# Patient Record
Sex: FEMALE | Race: WHITE | NOT HISPANIC OR LATINO | Employment: OTHER | URBAN - METROPOLITAN AREA
[De-identification: names, ages, dates, MRNs, and addresses within clinical notes are randomized per-mention and may not be internally consistent; named-entity substitution may affect disease eponyms.]

---

## 2017-10-11 ENCOUNTER — OFFICE VISIT (OUTPATIENT)
Dept: URGENT CARE | Facility: PHYSICIAN GROUP | Age: 62
End: 2017-10-11

## 2017-10-11 VITALS
OXYGEN SATURATION: 98 % | BODY MASS INDEX: 28.93 KG/M2 | DIASTOLIC BLOOD PRESSURE: 62 MMHG | TEMPERATURE: 97 F | SYSTOLIC BLOOD PRESSURE: 126 MMHG | WEIGHT: 180 LBS | HEART RATE: 99 BPM | HEIGHT: 66 IN

## 2017-10-11 DIAGNOSIS — Z91.148 NON COMPLIANCE W MEDICATION REGIMEN: ICD-10-CM

## 2017-10-11 DIAGNOSIS — I10 ESSENTIAL HYPERTENSION: Primary | ICD-10-CM

## 2017-10-11 PROCEDURE — 99202 OFFICE O/P NEW SF 15 MIN: CPT | Performed by: NURSE PRACTITIONER

## 2017-10-11 PROCEDURE — 93000 ELECTROCARDIOGRAM COMPLETE: CPT | Performed by: NURSE PRACTITIONER

## 2017-10-11 ASSESSMENT — ENCOUNTER SYMPTOMS
FEVER: 0
HEADACHES: 0
VOMITING: 0
PALPITATIONS: 0
SPEECH CHANGE: 0
CHILLS: 0
BLURRED VISION: 0
NAUSEA: 0
FOCAL WEAKNESS: 0
DOUBLE VISION: 0
DIZZINESS: 1
MYALGIAS: 0
SHORTNESS OF BREATH: 0
SENSORY CHANGE: 0

## 2017-10-12 NOTE — PROGRESS NOTES
"Subjective:      Rony Chilel is a 62 y.o. female who presents with Hypertension (Dizziness, nausea x1 wk. Pt was taken off BP medication due to weight loss, BP has been going up this past week, reaching 210/110.  )              Patient has a history of hypertension. She previously took amlodipine 10 mg by mouth daily. Patient states she changed her diet to a vegetarian diet. Her blood pressure gradually returned to normal and she was taken off amlodipine. Patient states within the last week she noticed her blood pressure was elevated when taken at home and she started back on her amlodipine 10 mg by mouth. Patient states today she took her blood pressure multiple times with it becoming more and more elevated. She had taken one amlodipine this morning and took another 5 mg and amlodipine roughly 1 hour ago. Patient also complains of dizziness for approximately one week intermittently. Currently, it has resolved. She denies any chest pain. She denies any shortness of breath, swelling in her legs or headaches.    Medications, Allergies and Prior Medical Hx reviewed and updated in [facility].with patient/family today           Review of Systems   Constitutional: Negative for chills and fever.   Eyes: Negative for blurred vision and double vision.   Respiratory: Negative for shortness of breath.    Cardiovascular: Negative for chest pain, palpitations and leg swelling.   Gastrointestinal: Negative for nausea and vomiting.   Musculoskeletal: Negative for myalgias.   Skin: Negative for rash.   Neurological: Positive for dizziness (intermittent). Negative for sensory change, speech change, focal weakness and headaches.          Objective:     /62   Pulse 99   Temp 36.1 °C (97 °F)   Ht 1.676 m (5' 6\")   Wt 81.6 kg (180 lb)   SpO2 98%   BMI 29.05 kg/m²      Physical Exam   Constitutional: She appears well-developed and well-nourished. No distress.   HENT:   Head: Normocephalic and atraumatic.   Eyes: Conjunctivae are " normal. Pupils are equal, round, and reactive to light.   Neck: Neck supple. Carotid bruit is not present.   Cardiovascular: Normal rate, regular rhythm and normal heart sounds.    Pulses:       Carotid pulses are 2+ on the right side, and 2+ on the left side.  Pulmonary/Chest: Effort normal and breath sounds normal. No respiratory distress.   Musculoskeletal: She exhibits no edema.   Neurological: She is alert.   Awake, alert, answering questions appropriately, moving all extremeties   Skin: Skin is warm and dry. No rash noted.   Psychiatric: She has a normal mood and affect. Her behavior is normal.   Vitals reviewed.              Assessment/Plan:       1. Essential hypertension     2. Non compliance w medication regimen         EKG Interpretation   Interpreted by me   Rhythm: normal sinus   Rate: 86  Axis: normal   Ectopy: none   Conduction: RSR in v1 v2  ST Segments: wnl  T Waves: wnl  Q Waves: none   Clinical Impression: normal EKG      BP recheck by provider on both arm with BP remaining 120's / 80's    Pt denies dizziness currently, BP is wnl. Pt has an appt with her pcp in am.   Pt was instructed to go home rest,   Go the the ED/911 for chest pain, increased dizziness, sob, headache or any other concerns  Follow-up with pcp in am   Discharge instructions discussed with pt/family who verbalize understanding and agreement with poc

## 2019-04-15 ENCOUNTER — OFFICE VISIT (OUTPATIENT)
Dept: URGENT CARE | Facility: PHYSICIAN GROUP | Age: 64
End: 2019-04-15

## 2019-04-15 VITALS
OXYGEN SATURATION: 93 % | SYSTOLIC BLOOD PRESSURE: 124 MMHG | WEIGHT: 190 LBS | HEIGHT: 66 IN | HEART RATE: 97 BPM | DIASTOLIC BLOOD PRESSURE: 86 MMHG | TEMPERATURE: 98.2 F | BODY MASS INDEX: 30.53 KG/M2

## 2019-04-15 DIAGNOSIS — J03.90 TONSILLITIS: ICD-10-CM

## 2019-04-15 PROCEDURE — 99214 OFFICE O/P EST MOD 30 MIN: CPT | Performed by: PHYSICIAN ASSISTANT

## 2019-04-15 RX ORDER — AMOXICILLIN 875 MG/1
875 TABLET, COATED ORAL 2 TIMES DAILY
Qty: 14 TAB | Refills: 0 | Status: SHIPPED | OUTPATIENT
Start: 2019-04-15 | End: 2019-04-22

## 2019-04-15 ASSESSMENT — ENCOUNTER SYMPTOMS
SORE THROAT: 1
SINUS PAIN: 0
CONSTITUTIONAL NEGATIVE: 1
FEVER: 0
COUGH: 1
CHILLS: 0
SHORTNESS OF BREATH: 0

## 2019-04-15 NOTE — PROGRESS NOTES
Subjective:   Rony Chilel is a 64 y.o. female who presents today with   Chief Complaint   Patient presents with   • Pharyngitis     Fatigue x 3 wks        Pharyngitis    This is a new problem. The current episode started 1 to 4 weeks ago. The problem has been unchanged. There has been no fever. Associated symptoms include coughing and a plugged ear sensation. Pertinent negatives include no shortness of breath.   Patient presents for evaluation of sore throat that she has had for over 2 weeks now following having the flu.  She has tried numerous over-the-counter remedies with no relief.  She is asking for amoxicillin today.    PMH:  has a past medical history of Hypertension and Psychiatric disorder.  MEDS:   Current Outpatient Prescriptions:   •  amoxicillin (AMOXIL) 875 MG tablet, Take 1 Tab by mouth 2 times a day for 7 days., Disp: 14 Tab, Rfl: 0  •  amlodipine (NORVASC) 10 MG TABS, Take 10 mg by mouth every day., Disp: , Rfl:   •  hydrocodone-acetaminophen (NORCO) 5-325 MG TABS per tablet, Take 1-2 Tabs by mouth every 6 hours as needed. (Patient not taking: Reported on 4/15/2019), Disp: 30 Tab, Rfl: 0  •  bacitracin-polymyxin b (POLYSPORIN) 500-42767 UNIT/GM OINT, Apply 1 Each to affected area(s) 3 times a day. (Patient not taking: Reported on 4/15/2019), Disp: 1 Tube, Rfl: 0  •  cyclobenzaprine (FLEXERIL) 5 MG tablet, Take 1 Tab by mouth 3 times a day as needed for Muscle Spasms. (Patient not taking: Reported on 4/15/2019), Disp: 21 Tab, Rfl: 0  •  paroxetine (PAXIL) 20 MG TABS, Take 20 mg by mouth every day., Disp: , Rfl:   ALLERGIES:   Allergies   Allergen Reactions   • Dilaudid [Hydromorphone] Itching   • Morphine Nausea   • Percocet [Apap-Fd&C Red #40 [place]-Oxycodone] Itching     SURGHX:   Past Surgical History:   Procedure Laterality Date   • CHOLECYSTECTOMY      Open     SOCHX:  reports that she has been smoking.  She has a 7.50 pack-year smoking history. She has never used smokeless tobacco. She  "reports that she does not drink alcohol or use drugs.  FH: Reviewed with patient, not pertinent to this visit.       Review of Systems   Constitutional: Negative.  Negative for chills and fever.   HENT: Positive for sore throat. Negative for sinus pain.    Respiratory: Positive for cough. Negative for shortness of breath.    All other systems reviewed and are negative.       Objective:   /86 (BP Location: Left arm, Patient Position: Sitting, BP Cuff Size: Large adult)   Pulse 97   Temp 36.8 °C (98.2 °F) (Temporal)   Ht 1.676 m (5' 6\")   Wt 86.2 kg (190 lb)   SpO2 93%   BMI 30.67 kg/m²   Physical Exam   Constitutional: She appears well-developed and well-nourished. No distress.   HENT:   Head: Normocephalic and atraumatic.   Right Ear: Hearing, tympanic membrane and ear canal normal.   Left Ear: Hearing, tympanic membrane and ear canal normal.   Mouth/Throat: Posterior oropharyngeal edema and posterior oropharyngeal erythema present. Tonsillar exudate.   Cerumen present bilaterally   Eyes: Pupils are equal, round, and reactive to light.   Cardiovascular: Normal rate, regular rhythm and normal heart sounds.    Pulmonary/Chest: Effort normal and breath sounds normal.   Musculoskeletal:   Normal movement in all 4 extremities   Neurological: She is alert. Coordination normal.   Skin: Skin is warm and dry.   Psychiatric: She has a normal mood and affect.   Nursing note and vitals reviewed.        Assessment/Plan:   Assessment    1. Tonsillitis  - amoxicillin (AMOXIL) 875 MG tablet; Take 1 Tab by mouth 2 times a day for 7 days.  Dispense: 14 Tab; Refill: 0  Discussed with patient this is likely tonsillitis given her symptom duration and clinical presentation.  Encourage patient to continue over-the-counter symptomatic relief.  Differential diagnosis, natural history, supportive care, and indications for immediate follow-up discussed.   Patient given instructions and understanding of medications and " treatment.    Offered patient Magic mouthwash today but she declined.  Patient was also offered ear lavage today but declined.  I encouraged her to use over-the-counter Debrox.  If not improving in 3-5 days, F/U with PCP or return to UC if symptoms worsen.  Strict ER precautions given.  Patient agreeable to plan.      Pedro Andrews PA-C

## 2021-03-15 DIAGNOSIS — Z23 NEED FOR VACCINATION: ICD-10-CM

## 2021-03-19 ENCOUNTER — PATIENT OUTREACH (OUTPATIENT)
Dept: SCHEDULING | Facility: IMAGING CENTER | Age: 66
End: 2021-03-19

## 2021-03-19 NOTE — PROGRESS NOTES
Attempt #1    Outreach for D1    Outreach Outcome: left VM    Transfer to [phone] if patient calls back to schedule appointment.

## 2022-05-31 NOTE — PROGRESS NOTES
REFERRING PHYSICIAN: Erick Victoria MD.     CONSULTING PHYSICIAN: Irvin Lama DO.    CHIEF COMPLAINT: chest pain    HISTORY OF PRESENT ILLNESS: The patient is a 67 y.o. female with a history of HTN, hyperlipidemia who presented to [facility] via EMS on 5/11/2022 with chest pain.  EKG showed STEMI.  She was urgently taken to the cath lab where she was found to have 100% occlusion of proximal RCA and 80% distal occlusion of the RCA which were treated with drug eluting stents.  She has significant multivessel residual coronary artery disease including 90% ostial LAD, 80-90% LAD stenosis at the diagonal branch, 90% left circumflex OM branch narrowing and 80% AV trunk narrowing.  She was discharged with no complications and returns today for follow-up for a staged CABG.    PAST MEDICAL HISTORY:   Past Medical History:   Diagnosis Date   • Hypertension    • Psychiatric disorder     Depression, PTSD, anxiety       PAST SURGICAL HISTORY:   Past Surgical History:   Procedure Laterality Date   • CHOLECYSTECTOMY      Open       FAMILY HISTORY:   No family history on file.     SOCIAL HISTORY:   Social History     Socioeconomic History   • Marital status:      Spouse name: Not on file   • Number of children: Not on file   • Years of education: Not on file   • Highest education level: Not on file   Occupational History   • Not on file   Tobacco Use   • Smoking status: Former Smoker     Packs/day: 0.50     Years: 15.00     Pack years: 7.50     Quit date: 6/4/2022   • Smokeless tobacco: Never Used   Substance and Sexual Activity   • Alcohol use: No     Comment: social   • Drug use: No   • Sexual activity: Not on file   Other Topics Concern   • Not on file   Social History Narrative   • Not on file     Social Determinants of Health     Financial Resource Strain: Not on file   Food Insecurity: Not on file   Transportation Needs: Not on file   Physical Activity: Not on file   Stress: Not on file   Social  Connections: Not on file   Intimate Partner Violence: Not on file   Housing Stability: Not on file       ALLERGIES:   Allergies   Allergen Reactions   • Dilaudid [Hydromorphone] Itching   • Morphine Nausea   • Percocet [Apap-Fd&C Red #40 [place]-Oxycodone] Itching        CURRENT MEDICATIONS:     Current Outpatient Medications:   •  aspirin 81 MG EC tablet, Take 81 mg by mouth every day., Disp: , Rfl:   •  triamcinolone (NASACORT ALLERGY 24HR) 55 MCG/ACT nasal inhaler, Administer  into affected nostril(S) every day., Disp: , Rfl:   •  acetaminophen (TYLENOL) 500 MG Tab, Take 500-1,000 mg by mouth every 6 hours as needed., Disp: , Rfl:   •  citalopram (CELEXA) 20 MG Tab, , Disp: , Rfl:   •  atorvastatin (LIPITOR) 40 MG Tab, , Disp: , Rfl:   •  metoprolol tartrate (LOPRESSOR) 25 MG Tab, , Disp: , Rfl:   •  BRILINTA 90 MG Tab tablet, , Disp: , Rfl:   •  hydrocodone-acetaminophen (NORCO) 5-325 MG TABS per tablet, Take 1-2 Tabs by mouth every 6 hours as needed. (Patient not taking: Reported on 4/15/2019), Disp: 30 Tab, Rfl: 0  •  bacitracin-polymyxin b (POLYSPORIN) 500-21003 UNIT/GM OINT, Apply 1 Each to affected area(s) 3 times a day. (Patient not taking: Reported on 4/15/2019), Disp: 1 Tube, Rfl: 0  •  cyclobenzaprine (FLEXERIL) 5 MG tablet, Take 1 Tab by mouth 3 times a day as needed for Muscle Spasms. (Patient not taking: Reported on 4/15/2019), Disp: 21 Tab, Rfl: 0  •  amlodipine (NORVASC) 10 MG TABS, Take 10 mg by mouth every day., Disp: , Rfl:   •  paroxetine (PAXIL) 20 MG TABS, Take 20 mg by mouth every day., Disp: , Rfl:      LABS REVIEWED:  Lab Results   Component Value Date/Time    SODIUM 127 (L) 05/28/2015 03:11 AM    POTASSIUM 3.7 05/28/2015 03:11 AM    CHLORIDE 96 05/28/2015 03:11 AM    CO2 24 05/28/2015 03:11 AM    GLUCOSE 126 (H) 05/28/2015 03:11 AM    BUN 12 05/28/2015 03:11 AM    CREATININE 0.67 05/28/2015 03:11 AM      No results found for: PROTHROMBTM, INR   Lab Results   Component Value Date/Time     WBC 9.0 05/28/2015 03:11 AM    RBC 4.21 05/28/2015 03:11 AM    HEMOGLOBIN 13.2 05/28/2015 03:11 AM    HEMATOCRIT 36.5 (L) 05/28/2015 03:11 AM    MCV 86.7 05/28/2015 03:11 AM    MCH 31.4 05/28/2015 03:11 AM    MCHC 36.2 (H) 05/28/2015 03:11 AM    RDW 38.9 05/28/2015 03:11 AM    PLATELETCT 283 05/28/2015 03:11 AM    MPV 8.9 (L) 05/28/2015 03:11 AM    NEUTSPOLYS 69.90 05/28/2015 03:11 AM    LYMPHOCYTES 19.70 (L) 05/28/2015 03:11 AM    MONOCYTES 7.80 05/28/2015 03:11 AM    EOSINOPHILS 0.90 05/28/2015 03:11 AM    BASOPHILS 1.00 05/28/2015 03:11 AM        IMAGING REVIEWED AND INTERPRETED:    ECHOCARDIOGRAM: [facility] 5/13/2022  1. The left ventricle is normal size and function. LVEF estimated at 55% by Jett's biplane method. Normal wall thickness. No segmental wall motion abnormalities seen. Grade 1 (impaired relaxation) diastolic dysfunction. No VSD or apical thrombus seen.  2. The right ventricle is normal in size and function. RVSP estimated at 27 mmHg with RAP of 3 mmHg.    ANGIOGRAM: 5/11/2022  Right coronary artery: Dominant vessel diffuse disease is present the vessel is totally occluded near its origin with CAMRON 0 flow    Left main trunk: Normal.    Left anterior descending colon 90% ostial narrowing right off the left main. Additional bifurcation 80 to 90% narrowing at the diagonal branch with an unusual band in the lesion additional 40% narrowing in the distal portion    Circumflex: Nondominant supplies a large obtuse marginal branch which has 90% narrowing. The AV trunk is narrowed 80% at its bifurcation from the obtuse marginal. This is prior to the takeoff of the posterior lateral branch    Left ventriculogram: Normal volume in diastole normal volume and systole. There is mild hypokinesis of the very proximal inferior wall ejection fraction estimate is 50% there is no left ventricular outflow gradient. Left ventricular end-diastolic pressure is 20 mmHg    PCI of the right coronary artery: The  proximal stenosis was reduced from 100% to 0% CAMRON flow improved from 0 to grade 3. The lesion in the distal segment is open from 80% to 0% residual. The posterior descending appears to be small and/or underfilled and appears to have a ostial lesion of about 50 to 70%.  Conclusion:  · Prox RCA lesion is 100% stenosed.    Acute inferior STEMI with total occlusion of the proximal right coronary artery with CAMRON 0 flow.  Successful stenting of the proximal and distal right coronary artery  Minimal left ventricular dysfunction  Triple-vessel complex coronary artery disease involving all major coronary branches.     Recommendations: Aggressive medical treatment consideration for coronary bypass surgery in the recovery phase of her MI. Patient's anatomy is not suitable for PCI.    REVIEW OF SYSTEMS:   Review of Systems   Constitutional: Negative.    HENT: Negative.    Eyes: Negative.    Respiratory: Negative.    Cardiovascular: Positive for chest pain.   Gastrointestinal: Negative.    Genitourinary: Negative.    Musculoskeletal: Negative.    Skin: Negative.    Neurological: Negative.    Endo/Heme/Allergies: Negative.    Psychiatric/Behavioral: Negative.        PHYSICAL EXAMINATION:   Physical Exam  Vitals and nursing note reviewed.   Constitutional:       General: She is not in acute distress.  HENT:      Head: Normocephalic and atraumatic.      Nose: Nose normal.   Eyes:      Conjunctiva/sclera: Conjunctivae normal.      Pupils: Pupils are equal, round, and reactive to light.   Neck:      Vascular: No JVD.      Trachea: No tracheal deviation.   Cardiovascular:      Rate and Rhythm: Normal rate and regular rhythm.      Heart sounds: No murmur heard.  Pulmonary:      Effort: Pulmonary effort is normal. No respiratory distress.      Breath sounds: Normal breath sounds. No stridor.   Abdominal:      General: Bowel sounds are normal. There is no distension.      Palpations: Abdomen is soft.      Tenderness: There is no  "abdominal tenderness.   Musculoskeletal:         General: No tenderness. Normal range of motion.      Cervical back: Normal range of motion and neck supple.   Skin:     General: Skin is warm and dry.   Neurological:      General: No focal deficit present.      Mental Status: She is alert and oriented to person, place, and time.      Coordination: Coordination normal.      Gait: Gait is intact.   Psychiatric:         Mood and Affect: Mood and affect normal.         Behavior: Behavior normal.         Cognition and Memory: Memory normal.       CONSTITUTIONAL:  /64 (BP Location: Left arm, Patient Position: Sitting, BP Cuff Size: Adult)   Pulse 70   Temp 36.2 °C (97.2 °F) (Temporal)   Ht 1.676 m (5' 6\")   Wt 78.5 kg (173 lb 1.6 oz)   SpO2 97%       IMPRESSION:  STEMI status post PCI to the RCA, multivessel coronary artery disease,      PLAN:  I recommend coronary bypass grafting x2-3, endovascular vein harvest, SACHA    The procedure, its risks, benefits, potential complications and alternative treatments were discussed with the patient in detail including the risks should she decide not to undergo my recommended treatment. All of her questions were answered to her satisfaction and she is willing to proceed with the operation. The risks include death, stroke,  infection: to include a rare bacterial infection related to the use of the heart/lung machine, robby-operative myocardial infarction, dysrhythmias, diaphragmatic paralysis, chest wall paresthesia, tracheostomy, kidney or other organ failure, possible return to the operating room for bleeding, bleeding requiring transfusion with its attendant risks including AIDS or hepatitis, dehiscence of surgical incisions, respiratory complications including the need for prolonged ventilator support, Protamine or other drug reaction, peripheral neuropathy, loss of limb, and miscount of surgical items. The operative mortality risk is approximately 3%. The STS mortality " risk score is 2% and the morbidity and mortality risk score is 11%. The scores were discussed with patient.    The operation, coronary bypass grafting x2-3 with endovascular vein harvest is scheduled for June 30, 2022 at 730 AM at [facility].    Sincerely,       Irvin Lama DO.

## 2022-06-07 ENCOUNTER — OFFICE VISIT (OUTPATIENT)
Dept: CARDIOTHORACIC SURGERY | Facility: MEDICAL CENTER | Age: 67
End: 2022-06-07
Payer: MEDICARE

## 2022-06-07 VITALS
BODY MASS INDEX: 27.82 KG/M2 | SYSTOLIC BLOOD PRESSURE: 124 MMHG | TEMPERATURE: 97.2 F | HEIGHT: 66 IN | HEART RATE: 70 BPM | OXYGEN SATURATION: 97 % | DIASTOLIC BLOOD PRESSURE: 64 MMHG | WEIGHT: 173.1 LBS

## 2022-06-07 DIAGNOSIS — I25.10 CORONARY ARTERY DISEASE INVOLVING NATIVE CORONARY ARTERY OF NATIVE HEART, UNSPECIFIED WHETHER ANGINA PRESENT: ICD-10-CM

## 2022-06-07 PROCEDURE — 99215 OFFICE O/P EST HI 40 MIN: CPT | Performed by: THORACIC SURGERY (CARDIOTHORACIC VASCULAR SURGERY)

## 2022-06-07 RX ORDER — TRIAMCINOLONE ACETONIDE 55 UG/1
SPRAY, METERED NASAL DAILY
COMMUNITY
End: 2022-06-28

## 2022-06-07 RX ORDER — TICAGRELOR 90 MG/1
90 TABLET ORAL 2 TIMES DAILY
COMMUNITY
Start: 2022-05-13 | End: 2023-11-22

## 2022-06-07 RX ORDER — ACETAMINOPHEN 500 MG
500 TABLET ORAL EVERY 6 HOURS PRN
COMMUNITY

## 2022-06-07 RX ORDER — CITALOPRAM 20 MG/1
40 TABLET ORAL EVERY EVENING
COMMUNITY
Start: 2022-06-04 | End: 2022-11-02

## 2022-06-07 RX ORDER — ASPIRIN 81 MG/1
81 TABLET ORAL DAILY
COMMUNITY
Start: 2022-05-14

## 2022-06-07 RX ORDER — ATORVASTATIN CALCIUM 40 MG/1
40 TABLET, FILM COATED ORAL EVERY EVENING
COMMUNITY
Start: 2022-06-06 | End: 2023-11-22

## 2022-06-07 ASSESSMENT — ENCOUNTER SYMPTOMS
PSYCHIATRIC NEGATIVE: 1
EYES NEGATIVE: 1
CONSTITUTIONAL NEGATIVE: 1
MUSCULOSKELETAL NEGATIVE: 1
RESPIRATORY NEGATIVE: 1
GASTROINTESTINAL NEGATIVE: 1
NEUROLOGICAL NEGATIVE: 1

## 2022-06-07 NOTE — NON-PROVIDER
Problem: Prehabilitation    Goal: optimize identified modifiable risk factors prior to cardiac surgery.     Intervention: Screening and interventions for the following  risk factors with educational materials provided if indicated  and patient demonstrates readiness to participate.  Dentition, MRSA (antibiotic stewardship), malnutrition,  alcohol abuse,tobacco abuse, recreational drug use, CAD,  home exerciseregimen appropriateness, and social support  system for post discharge planning. Also, teaching of and   participation of inspiratory muscle training via  incentive spirometer (provided) for all patients.    Review of post-surgical physical limitations, upcoming  appointments & testing, ordered diagnostics, and medication review  to identify and educate on anticoagulant and antihypertensives  that need cessation in the days prior to surgery.    The cardiac surgery prehabilitation sheet, surgery instruction sheet,  MAP, education booklet, vitals logbook, normals after heart surgery,  and cardiac rehab flier was provided for patient to read and review.    Carb load drink and surgical CHG wipes were also  provided with instructions on use.    DENTITION:  Routine dental appointment prior to surgery is  not indicated for CABG procedure, patient not educated.  Patient confirms current dental issues  that should be addressed prior to surgery.  They were not encouraged to get a dental  cleaning and clearance prior to surgery as she is on brilinta and is having a CABG.    INCENTIVE SPIROMETRY:  Discussed importance of incentive spirometry (IS) use,  20 times a day AT MINIMUM but more often if possible to  optimize cardio-pulmonary function prior to surgery.  They were instructed to allow a 5 minute break in  between uses to allow max IS volume.  Education with return demonstration performed.   Patient is effective, coaching was not needed.  Prehabilitation IS baseline is 1750.    HOME EXERCISE REGIMEN:  We discussed  importance of preventing deconditioning and  muscle wasting in the time preceding surgery.    Left main disease present? NO  EF-- 55%  Active sub lingual nitro use? NO-ordered/available  she is appropriate for initiation of a home exercise regimen.    she is minimally physically active; current exercise  tolerance/level is moderate due to no current symptoms. she was educated to an exercise regimen  of walking on a flat surface 30 minutes a day,  adjusting the length for tolerance level.  she was educated that if chest pain or SOB occurs  patient is to stop immediately and if symptoms do not  resolve after stopping to call 911.    she was also encouraged to practice sit to stand from a chair  without arm assistance 12 times a day to strengthen quadriceps.    CAD:  Patient does have known CAD; education on cholesterol, diet,  and the heart are indicated. Patient was  receptive to materials offered.    MALNUTRITION:  Malnutrition screening tool (MST) shows she is at risk  with a score of 3. (0-1 not at risk; greater or equal to 2 is at risk).  Patient's BMI is 27.  Education regarding portion sizing and diet are indicated. Patient was receptive to materials offered.    Given patient response to MST, calculated BMI,  functional capacity, and self reported diet, it was  recommended they increase their protein intake to 1.2 to 2.5 gram/kg/day.  This was calculated and provided to them at 94 grams per day.   Assessment of implementation at educational phone appointment will be done.    SMOKING:  Patient confirms recent smoking history.  Smoking risks and cessation  education indicated and provided as she quit 3 days ago.    ALCOHOL ABUSE:  Patient denies alcohol abuse.  Alcohol risks and cessation  education not indicated.    RECREATIONAL DRUG USE:  Patient denies illicit drug use.  Illicit drug use risks  and cessation education not indicated.    SOCIAL SUPPORT SYSTEM FOR DISCHARGE NEEDS:    Patient does not have family  to stay for 1-week post  discharge to assist with ADL's. Barriers to hospital  discharge anticipated as she has some neighbors that  can come check on her; she may need SNF prior to going  home.    PSYCHOLOGICAL PREPARATION:  We discussed the basics of physical limitation post op to  include:               No driving for 4 weeks               No lifting, pushing or pulling > 10 lbs for 6 weeks  Sternal precautions to include moving within the tube  and safe mobility in and out of bed and the chair.    ANTIBIOTIC STEWARDSHIP:  MRSA swab will be collected by [facility] during pre-admit testing.    MEDICATION AN UPCOMING APPOINTMENTS REVIEW:  Current medications were reviewed that may need  specific stop dates prior to surgery. The patient was instructed   to stop the following medications after the indicated date.    Brilinta to stop 7 full days prior to surgery; last dose 6/22  lisinopril to stop 2 full days prior to surgery; last dose 6/27    Walk test Times: 5 5 5    A phone appointment for pre op education was made for June 23rd at 1100 to review all provided education materials.

## 2022-06-23 ENCOUNTER — TELEPHONE (OUTPATIENT)
Dept: CARDIOTHORACIC SURGERY | Facility: MEDICAL CENTER | Age: 67
End: 2022-06-23
Payer: MEDICARE

## 2022-06-23 DIAGNOSIS — I25.10 CORONARY ARTERY DISEASE INVOLVING NATIVE CORONARY ARTERY OF NATIVE HEART, UNSPECIFIED WHETHER ANGINA PRESENT: ICD-10-CM

## 2022-06-23 NOTE — TELEPHONE ENCOUNTER
[place]ABG [place] 2-3 with [place]30 [place] [facility] 0730    IS was at 1750 she is now at 2000 most of the time    She has been able to continue with the smoking cessation    She did 70 to 80 grams most days of increased protein    She does have some limited help at home; friends to check in on her; will eval post op home vs SNF    She has stopped brilinta as of yesterday    She is off lisinopril due to side effects and her BP is not too high off it    She is not to take any meds the morning    She needs carotid US and vein mapping ordered as she is now going to be at [facility]. Placed orders for stat given surgery is next week.    I called imaging scheduling a few times to be sure and spoke with the auth supervisor who cannot set up her Ultrasound due to insurance not being contracted. They must do a refer to [facility] and get auth and then send the referral to an outside facility who can then schedule her. With 4 1/2 business days to complete prior to surgery there is concern it can be scheduled and completed before 6/30.  Will check back Monday of next week.    I also emailed the changes to location and time check in for [facility] as it different from [facility] and a map    Her PAT has been set for next week.    Call time 25 minutes    US set up attempt call times: 45 minutes

## 2022-06-28 ENCOUNTER — PRE-ADMISSION TESTING (OUTPATIENT)
Dept: ADMISSIONS | Facility: MEDICAL CENTER | Age: 67
DRG: 236 | End: 2022-06-28
Attending: THORACIC SURGERY (CARDIOTHORACIC VASCULAR SURGERY)
Payer: MEDICARE

## 2022-06-28 ENCOUNTER — HOSPITAL ENCOUNTER (OUTPATIENT)
Dept: RADIOLOGY | Facility: MEDICAL CENTER | Age: 67
DRG: 236 | End: 2022-06-28
Attending: THORACIC SURGERY (CARDIOTHORACIC VASCULAR SURGERY) | Admitting: THORACIC SURGERY (CARDIOTHORACIC VASCULAR SURGERY)
Payer: MEDICARE

## 2022-06-28 DIAGNOSIS — Z01.812 PRE-OPERATIVE LABORATORY EXAMINATION: ICD-10-CM

## 2022-06-28 DIAGNOSIS — Z01.811 PRE-OPERATIVE RESPIRATORY EXAMINATION: ICD-10-CM

## 2022-06-28 DIAGNOSIS — Z01.810 PRE-OPERATIVE CARDIOVASCULAR EXAMINATION: ICD-10-CM

## 2022-06-28 LAB
ABO GROUP BLD: NORMAL
ALBUMIN SERPL BCP-MCNC: 4.8 G/DL (ref 3.2–4.9)
ALBUMIN/GLOB SERPL: 2.2 G/DL
ALP SERPL-CCNC: 117 U/L (ref 30–99)
ALT SERPL-CCNC: 18 U/L (ref 2–50)
AMPHET UR QL SCN: NEGATIVE
ANION GAP SERPL CALC-SCNC: 12 MMOL/L (ref 7–16)
APPEARANCE UR: CLEAR
APTT PPP: 27.6 SEC (ref 24.7–36)
AST SERPL-CCNC: 12 U/L (ref 12–45)
BACTERIA #/AREA URNS HPF: NEGATIVE /HPF
BARBITURATES UR QL SCN: NEGATIVE
BASOPHILS # BLD AUTO: 0.8 % (ref 0–1.8)
BASOPHILS # BLD: 0.07 K/UL (ref 0–0.12)
BENZODIAZ UR QL SCN: NEGATIVE
BILIRUB SERPL-MCNC: 0.5 MG/DL (ref 0.1–1.5)
BILIRUB UR QL STRIP.AUTO: NEGATIVE
BLD GP AB SCN SERPL QL: NORMAL
BUN SERPL-MCNC: 15 MG/DL (ref 8–22)
BZE UR QL SCN: NEGATIVE
CALCIUM SERPL-MCNC: 9.5 MG/DL (ref 8.5–10.5)
CANNABINOIDS UR QL SCN: POSITIVE
CHLORIDE SERPL-SCNC: 98 MMOL/L (ref 96–112)
CO2 SERPL-SCNC: 25 MMOL/L (ref 20–33)
COLOR UR: YELLOW
CREAT SERPL-MCNC: 0.57 MG/DL (ref 0.5–1.4)
EKG IMPRESSION: NORMAL
EOSINOPHIL # BLD AUTO: 0.03 K/UL (ref 0–0.51)
EOSINOPHIL NFR BLD: 0.3 % (ref 0–6.9)
EPI CELLS #/AREA URNS HPF: NORMAL /HPF
ERYTHROCYTE [DISTWIDTH] IN BLOOD BY AUTOMATED COUNT: 42.5 FL (ref 35.9–50)
EST. AVERAGE GLUCOSE BLD GHB EST-MCNC: 108 MG/DL
GFR SERPLBLD CREATININE-BSD FMLA CKD-EPI: 100 ML/MIN/1.73 M 2
GLOBULIN SER CALC-MCNC: 2.2 G/DL (ref 1.9–3.5)
GLUCOSE SERPL-MCNC: 110 MG/DL (ref 65–99)
GLUCOSE UR STRIP.AUTO-MCNC: NEGATIVE MG/DL
HBA1C MFR BLD: 5.4 % (ref 4–5.6)
HCT VFR BLD AUTO: 38.8 % (ref 37–47)
HGB BLD-MCNC: 13.9 G/DL (ref 12–16)
HYALINE CASTS #/AREA URNS LPF: NORMAL /LPF
IMM GRANULOCYTES # BLD AUTO: 0.02 K/UL (ref 0–0.11)
IMM GRANULOCYTES NFR BLD AUTO: 0.2 % (ref 0–0.9)
INR PPP: 1.08 (ref 0.87–1.13)
KETONES UR STRIP.AUTO-MCNC: NEGATIVE MG/DL
LEUKOCYTE ESTERASE UR QL STRIP.AUTO: ABNORMAL
LYMPHOCYTES # BLD AUTO: 1.78 K/UL (ref 1–4.8)
LYMPHOCYTES NFR BLD: 20.5 % (ref 22–41)
MCH RBC QN AUTO: 32.3 PG (ref 27–33)
MCHC RBC AUTO-ENTMCNC: 35.8 G/DL (ref 33.6–35)
MCV RBC AUTO: 90 FL (ref 81.4–97.8)
METHADONE UR QL SCN: NEGATIVE
MICRO URNS: ABNORMAL
MONOCYTES # BLD AUTO: 0.83 K/UL (ref 0–0.85)
MONOCYTES NFR BLD AUTO: 9.6 % (ref 0–13.4)
NEUTROPHILS # BLD AUTO: 5.95 K/UL (ref 2–7.15)
NEUTROPHILS NFR BLD: 68.6 % (ref 44–72)
NITRITE UR QL STRIP.AUTO: NEGATIVE
NRBC # BLD AUTO: 0 K/UL
NRBC BLD-RTO: 0 /100 WBC
OPIATES UR QL SCN: NEGATIVE
OXYCODONE UR QL SCN: NEGATIVE
PCP UR QL SCN: NEGATIVE
PH UR STRIP.AUTO: 6.5 [PH] (ref 5–8)
PLATELET # BLD AUTO: 331 K/UL (ref 164–446)
PMV BLD AUTO: 9.3 FL (ref 9–12.9)
POTASSIUM SERPL-SCNC: 3.9 MMOL/L (ref 3.6–5.5)
PROPOXYPH UR QL SCN: NEGATIVE
PROT SERPL-MCNC: 7 G/DL (ref 6–8.2)
PROT UR QL STRIP: NEGATIVE MG/DL
PROTHROMBIN TIME: 13.6 SEC (ref 12–14.6)
RBC # BLD AUTO: 4.31 M/UL (ref 4.2–5.4)
RBC # URNS HPF: NORMAL /HPF
RBC UR QL AUTO: NEGATIVE
RH BLD: NORMAL
SCCMEC + MECA PNL NOSE NAA+PROBE: NEGATIVE
SCCMEC + MECA PNL NOSE NAA+PROBE: NEGATIVE
SODIUM SERPL-SCNC: 135 MMOL/L (ref 135–145)
SP GR UR STRIP.AUTO: 1.02
UROBILINOGEN UR STRIP.AUTO-MCNC: 1 MG/DL
WBC # BLD AUTO: 8.7 K/UL (ref 4.8–10.8)
WBC #/AREA URNS HPF: NORMAL /HPF

## 2022-06-28 PROCEDURE — 85730 THROMBOPLASTIN TIME PARTIAL: CPT

## 2022-06-28 PROCEDURE — 87640 STAPH A DNA AMP PROBE: CPT

## 2022-06-28 PROCEDURE — 86900 BLOOD TYPING SEROLOGIC ABO: CPT

## 2022-06-28 PROCEDURE — 93010 ELECTROCARDIOGRAM REPORT: CPT | Performed by: INTERNAL MEDICINE

## 2022-06-28 PROCEDURE — 86850 RBC ANTIBODY SCREEN: CPT

## 2022-06-28 PROCEDURE — 87641 MR-STAPH DNA AMP PROBE: CPT

## 2022-06-28 PROCEDURE — 36415 COLL VENOUS BLD VENIPUNCTURE: CPT

## 2022-06-28 PROCEDURE — 85025 COMPLETE CBC W/AUTO DIFF WBC: CPT

## 2022-06-28 PROCEDURE — 86901 BLOOD TYPING SEROLOGIC RH(D): CPT

## 2022-06-28 PROCEDURE — 83036 HEMOGLOBIN GLYCOSYLATED A1C: CPT

## 2022-06-28 PROCEDURE — 85610 PROTHROMBIN TIME: CPT

## 2022-06-28 PROCEDURE — 93005 ELECTROCARDIOGRAM TRACING: CPT

## 2022-06-28 PROCEDURE — 71046 X-RAY EXAM CHEST 2 VIEWS: CPT

## 2022-06-28 PROCEDURE — 80307 DRUG TEST PRSMV CHEM ANLYZR: CPT

## 2022-06-28 PROCEDURE — 81001 URINALYSIS AUTO W/SCOPE: CPT

## 2022-06-28 PROCEDURE — 80053 COMPREHEN METABOLIC PANEL: CPT

## 2022-06-29 ENCOUNTER — ANESTHESIA EVENT (OUTPATIENT)
Dept: SURGERY | Facility: MEDICAL CENTER | Age: 67
DRG: 236 | End: 2022-06-29
Payer: MEDICARE

## 2022-06-29 RX ORDER — EPINEPHRINE HCL IN 0.9 % NACL 4MG/250ML
0-.2 PLASTIC BAG, INJECTION (ML) INTRAVENOUS CONTINUOUS
Status: DISCONTINUED | OUTPATIENT
Start: 2022-06-30 | End: 2022-06-30

## 2022-06-29 RX ORDER — METHADONE HYDROCHLORIDE 10 MG/ML
20 INJECTION, SOLUTION INTRAMUSCULAR; INTRAVENOUS; SUBCUTANEOUS ONCE
Status: COMPLETED | OUTPATIENT
Start: 2022-06-30 | End: 2022-06-30

## 2022-06-29 RX ORDER — NOREPINEPHRINE BITARTRATE 0.03 MG/ML
0-30 INJECTION, SOLUTION INTRAVENOUS CONTINUOUS
Status: DISCONTINUED | OUTPATIENT
Start: 2022-06-30 | End: 2022-06-30

## 2022-06-29 RX ORDER — DEXMEDETOMIDINE HYDROCHLORIDE 4 UG/ML
0-1.5 INJECTION INTRAVENOUS CONTINUOUS
Status: DISCONTINUED | OUTPATIENT
Start: 2022-06-30 | End: 2022-06-30

## 2022-06-30 ENCOUNTER — APPOINTMENT (OUTPATIENT)
Dept: CARDIOLOGY | Facility: MEDICAL CENTER | Age: 67
DRG: 236 | End: 2022-06-30
Attending: THORACIC SURGERY (CARDIOTHORACIC VASCULAR SURGERY)
Payer: MEDICARE

## 2022-06-30 ENCOUNTER — APPOINTMENT (OUTPATIENT)
Dept: RADIOLOGY | Facility: MEDICAL CENTER | Age: 67
DRG: 236 | End: 2022-06-30
Attending: THORACIC SURGERY (CARDIOTHORACIC VASCULAR SURGERY)
Payer: MEDICARE

## 2022-06-30 ENCOUNTER — PATIENT OUTREACH (OUTPATIENT)
Dept: SCHEDULING | Facility: IMAGING CENTER | Age: 67
End: 2022-06-30

## 2022-06-30 ENCOUNTER — HOSPITAL ENCOUNTER (INPATIENT)
Facility: MEDICAL CENTER | Age: 67
LOS: 6 days | DRG: 236 | End: 2022-07-06
Attending: THORACIC SURGERY (CARDIOTHORACIC VASCULAR SURGERY) | Admitting: THORACIC SURGERY (CARDIOTHORACIC VASCULAR SURGERY)
Payer: MEDICARE

## 2022-06-30 ENCOUNTER — ANESTHESIA (OUTPATIENT)
Dept: SURGERY | Facility: MEDICAL CENTER | Age: 67
DRG: 236 | End: 2022-06-30
Payer: MEDICARE

## 2022-06-30 DIAGNOSIS — G89.18 ACUTE POST-OPERATIVE PAIN: ICD-10-CM

## 2022-06-30 DIAGNOSIS — Z95.1 S/P CABG X 3: ICD-10-CM

## 2022-06-30 DIAGNOSIS — F41.9 ANXIETY: ICD-10-CM

## 2022-06-30 DIAGNOSIS — I25.10 3-VESSEL CAD: ICD-10-CM

## 2022-06-30 DIAGNOSIS — I10 HYPERTENSION, BENIGN: ICD-10-CM

## 2022-06-30 PROBLEM — Z99.11 ON MECHANICALLY ASSISTED VENTILATION (HCC): Status: ACTIVE | Noted: 2022-06-30

## 2022-06-30 LAB
ABO + RH BLD: NORMAL
ACT BLD: 138 SEC (ref 74–137)
ACT BLD: 144 SEC (ref 74–137)
ACT BLD: 503 SEC (ref 74–137)
ACT BLD: 746 SEC (ref 74–137)
ACT BLD: 764 SEC (ref 74–137)
APTT PPP: 77 SEC (ref 24.7–36)
BARCODED ABORH UBTYP: 5100
BARCODED ABORH UBTYP: 5100
BARCODED PRD CODE UBPRD: NORMAL
BARCODED PRD CODE UBPRD: NORMAL
BARCODED UNIT NUM UBUNT: NORMAL
BARCODED UNIT NUM UBUNT: NORMAL
BASE EXCESS BLDA CALC-SCNC: -2 MMOL/L (ref -4–3)
BASE EXCESS BLDA CALC-SCNC: -2 MMOL/L (ref -4–3)
BASE EXCESS BLDA CALC-SCNC: -3 MMOL/L (ref -4–3)
BASE EXCESS BLDA CALC-SCNC: 0 MMOL/L (ref -4–3)
BASE EXCESS BLDA CALC-SCNC: 1 MMOL/L (ref -4–3)
BODY TEMPERATURE: ABNORMAL DEGREES
CA-I BLD ISE-SCNC: 0.91 MMOL/L (ref 1.1–1.3)
CA-I BLD ISE-SCNC: 1.23 MMOL/L (ref 1.1–1.3)
CA-I BLD ISE-SCNC: 1.27 MMOL/L (ref 1.1–1.3)
CO2 BLDA-SCNC: 22 MMOL/L (ref 20–33)
CO2 BLDA-SCNC: 23 MMOL/L (ref 20–33)
CO2 BLDA-SCNC: 25 MMOL/L (ref 20–33)
CO2 BLDA-SCNC: 25 MMOL/L (ref 20–33)
CO2 BLDA-SCNC: 27 MMOL/L (ref 20–33)
CO2 BLDA-SCNC: 27 MMOL/L (ref 20–33)
CO2 BLDA-SCNC: 28 MMOL/L (ref 20–33)
COMPONENT R 8504R: NORMAL
COMPONENT R 8504R: NORMAL
DELSYS IDSYS: ABNORMAL
EKG IMPRESSION: NORMAL
EKG IMPRESSION: NORMAL
END TIDAL CARBON DIOXIDE IECO2: 25 MMHG
END TIDAL CARBON DIOXIDE IECO2: 28 MMHG
END TIDAL CARBON DIOXIDE IECO2: 32 MMHG
GLUCOSE BLD STRIP.AUTO-MCNC: 145 MG/DL (ref 65–99)
GLUCOSE BLD STRIP.AUTO-MCNC: 167 MG/DL (ref 65–99)
GLUCOSE BLD STRIP.AUTO-MCNC: 173 MG/DL (ref 65–99)
GLUCOSE BLD STRIP.AUTO-MCNC: 185 MG/DL (ref 65–99)
HCO3 BLDA-SCNC: 20.9 MMOL/L (ref 17–25)
HCO3 BLDA-SCNC: 21.6 MMOL/L (ref 17–25)
HCO3 BLDA-SCNC: 23.5 MMOL/L (ref 17–25)
HCO3 BLDA-SCNC: 24.2 MMOL/L (ref 17–25)
HCO3 BLDA-SCNC: 25.5 MMOL/L (ref 17–25)
HCO3 BLDA-SCNC: 26.1 MMOL/L (ref 17–25)
HCO3 BLDA-SCNC: 26.5 MMOL/L (ref 17–25)
HCT VFR BLD CALC: 21 % (ref 37–47)
HCT VFR BLD CALC: 21 % (ref 37–47)
HCT VFR BLD CALC: 32 % (ref 37–47)
HGB BLD-MCNC: 10.9 G/DL (ref 12–16)
HGB BLD-MCNC: 7.1 G/DL (ref 12–16)
HGB BLD-MCNC: 7.1 G/DL (ref 12–16)
HOROWITZ INDEX BLDA+IHG-RTO: 124 MM[HG]
HOROWITZ INDEX BLDA+IHG-RTO: 165 MM[HG]
HOROWITZ INDEX BLDA+IHG-RTO: 207 MM[HG]
INR PPP: 1.57 (ref 0.87–1.13)
LV EJECT FRACT  99904: 70
MAGNESIUM SERPL-MCNC: 2.7 MG/DL (ref 1.5–2.5)
MODE IMODE: ABNORMAL
O2/TOTAL GAS SETTING VFR VENT: 100 %
O2/TOTAL GAS SETTING VFR VENT: 60 %
O2/TOTAL GAS SETTING VFR VENT: 80 %
PCO2 BLDA: 29.7 MMHG (ref 26–37)
PCO2 BLDA: 34.6 MMHG (ref 26–37)
PCO2 BLDA: 38.4 MMHG (ref 26–37)
PCO2 BLDA: 38.9 MMHG (ref 26–37)
PCO2 BLDA: 40.7 MMHG (ref 26–37)
PCO2 BLDA: 44.4 MMHG (ref 26–37)
PCO2 BLDA: 46.7 MMHG (ref 26–37)
PCO2 TEMP ADJ BLDA: 28.4 MMHG (ref 26–37)
PCO2 TEMP ADJ BLDA: 33.1 MMHG (ref 26–37)
PCO2 TEMP ADJ BLDA: 34.7 MMHG (ref 26–37)
PCO2 TEMP ADJ BLDA: 37.2 MMHG (ref 26–37)
PCO2 TEMP ADJ BLDA: 42.6 MMHG (ref 26–37)
PEEP END EXPIRATORY PRESSURE IPEEP: 8 CMH20
PERCENT MINUTE VOLUME IPMV: 130
PERCENT MINUTE VOLUME IPMV: 160
PERCENT MINUTE VOLUME IPMV: 160
PH BLDA: 7.36 [PH] (ref 7.4–7.5)
PH BLDA: 7.37 [PH] (ref 7.4–7.5)
PH BLDA: 7.38 [PH] (ref 7.4–7.5)
PH BLDA: 7.4 [PH] (ref 7.4–7.5)
PH BLDA: 7.41 [PH] (ref 7.4–7.5)
PH BLDA: 7.42 [PH] (ref 7.4–7.5)
PH BLDA: 7.46 [PH] (ref 7.4–7.5)
PH TEMP ADJ BLDA: 7.39 [PH] (ref 7.4–7.5)
PH TEMP ADJ BLDA: 7.42 [PH] (ref 7.4–7.5)
PH TEMP ADJ BLDA: 7.42 [PH] (ref 7.4–7.5)
PH TEMP ADJ BLDA: 7.46 [PH] (ref 7.4–7.5)
PH TEMP ADJ BLDA: 7.47 [PH] (ref 7.4–7.5)
PLATELET # BLD AUTO: 158 K/UL (ref 164–446)
PO2 BLDA: 124 MMHG (ref 64–87)
PO2 BLDA: 165 MMHG (ref 64–87)
PO2 BLDA: 299 MMHG (ref 64–87)
PO2 BLDA: 321 MMHG (ref 64–87)
PO2 BLDA: 337 MMHG (ref 64–87)
PO2 BLDA: 356 MMHG (ref 64–87)
PO2 BLDA: 99 MMHG (ref 64–87)
PO2 TEMP ADJ BLDA: 118 MMHG (ref 64–87)
PO2 TEMP ADJ BLDA: 161 MMHG (ref 64–87)
PO2 TEMP ADJ BLDA: 287 MMHG (ref 64–87)
PO2 TEMP ADJ BLDA: 311 MMHG (ref 64–87)
PO2 TEMP ADJ BLDA: 93 MMHG (ref 64–87)
POTASSIUM BLD-SCNC: 3.4 MMOL/L (ref 3.6–5.5)
POTASSIUM BLD-SCNC: 4.4 MMOL/L (ref 3.6–5.5)
POTASSIUM BLD-SCNC: 5.4 MMOL/L (ref 3.6–5.5)
POTASSIUM SERPL-SCNC: 3.9 MMOL/L (ref 3.6–5.5)
POTASSIUM SERPL-SCNC: 4.1 MMOL/L (ref 3.6–5.5)
PRODUCT TYPE UPROD: NORMAL
PRODUCT TYPE UPROD: NORMAL
PROTHROMBIN TIME: 18.4 SEC (ref 12–14.6)
SAO2 % BLDA: 100 % (ref 93–99)
SAO2 % BLDA: 98 % (ref 93–99)
SAO2 % BLDA: 99 % (ref 93–99)
SODIUM BLD-SCNC: 136 MMOL/L (ref 135–145)
SODIUM BLD-SCNC: 137 MMOL/L (ref 135–145)
SODIUM BLD-SCNC: 139 MMOL/L (ref 135–145)
SODIUM BLD-SCNC: 139 MMOL/L (ref 135–145)
SPECIMEN DRAWN FROM PATIENT: ABNORMAL
UNIT STATUS USTAT: NORMAL
UNIT STATUS USTAT: NORMAL

## 2022-06-30 PROCEDURE — 700111 HCHG RX REV CODE 636 W/ 250 OVERRIDE (IP): Performed by: CLINICAL NURSE SPECIALIST

## 2022-06-30 PROCEDURE — 700105 HCHG RX REV CODE 258: Performed by: THORACIC SURGERY (CARDIOTHORACIC VASCULAR SURGERY)

## 2022-06-30 PROCEDURE — C1725 CATH, TRANSLUMIN NON-LASER: HCPCS | Performed by: THORACIC SURGERY (CARDIOTHORACIC VASCULAR SURGERY)

## 2022-06-30 PROCEDURE — 00567 ANES DIRECT CABG W/PUMP: CPT | Performed by: STUDENT IN AN ORGANIZED HEALTH CARE EDUCATION/TRAINING PROGRAM

## 2022-06-30 PROCEDURE — 02100Z9 BYPASS CORONARY ARTERY, ONE ARTERY FROM LEFT INTERNAL MAMMARY, OPEN APPROACH: ICD-10-PCS | Performed by: THORACIC SURGERY (CARDIOTHORACIC VASCULAR SURGERY)

## 2022-06-30 PROCEDURE — 33533 CABG ARTERIAL SINGLE: CPT | Mod: AS | Performed by: CLINICAL NURSE SPECIALIST

## 2022-06-30 PROCEDURE — 93010 ELECTROCARDIOGRAM REPORT: CPT | Mod: GZ | Performed by: INTERNAL MEDICINE

## 2022-06-30 PROCEDURE — 700111 HCHG RX REV CODE 636 W/ 250 OVERRIDE (IP): Performed by: STUDENT IN AN ORGANIZED HEALTH CARE EDUCATION/TRAINING PROGRAM

## 2022-06-30 PROCEDURE — 700111 HCHG RX REV CODE 636 W/ 250 OVERRIDE (IP): Performed by: THORACIC SURGERY (CARDIOTHORACIC VASCULAR SURGERY)

## 2022-06-30 PROCEDURE — 84132 ASSAY OF SERUM POTASSIUM: CPT | Mod: 91

## 2022-06-30 PROCEDURE — 160031 HCHG SURGERY MINUTES - 1ST 30 MINS LEVEL 5: Performed by: THORACIC SURGERY (CARDIOTHORACIC VASCULAR SURGERY)

## 2022-06-30 PROCEDURE — C1729 CATH, DRAINAGE: HCPCS | Performed by: THORACIC SURGERY (CARDIOTHORACIC VASCULAR SURGERY)

## 2022-06-30 PROCEDURE — 94002 VENT MGMT INPAT INIT DAY: CPT

## 2022-06-30 PROCEDURE — 700105 HCHG RX REV CODE 258: Performed by: CLINICAL NURSE SPECIALIST

## 2022-06-30 PROCEDURE — 85049 AUTOMATED PLATELET COUNT: CPT

## 2022-06-30 PROCEDURE — P9047 ALBUMIN (HUMAN), 25%, 50ML: HCPCS | Mod: JG

## 2022-06-30 PROCEDURE — 85730 THROMBOPLASTIN TIME PARTIAL: CPT

## 2022-06-30 PROCEDURE — C9248 INJ, CLEVIDIPINE BUTYRATE: HCPCS | Performed by: STUDENT IN AN ORGANIZED HEALTH CARE EDUCATION/TRAINING PROGRAM

## 2022-06-30 PROCEDURE — 700111 HCHG RX REV CODE 636 W/ 250 OVERRIDE (IP)

## 2022-06-30 PROCEDURE — 700102 HCHG RX REV CODE 250 W/ 637 OVERRIDE(OP): Performed by: THORACIC SURGERY (CARDIOTHORACIC VASCULAR SURGERY)

## 2022-06-30 PROCEDURE — 503001 HCHG PERFUSION: Performed by: THORACIC SURGERY (CARDIOTHORACIC VASCULAR SURGERY)

## 2022-06-30 PROCEDURE — 93325 DOPPLER ECHO COLOR FLOW MAPG: CPT | Mod: 26 | Performed by: STUDENT IN AN ORGANIZED HEALTH CARE EDUCATION/TRAINING PROGRAM

## 2022-06-30 PROCEDURE — 71045 X-RAY EXAM CHEST 1 VIEW: CPT

## 2022-06-30 PROCEDURE — C1898 LEAD, PMKR, OTHER THAN TRANS: HCPCS | Performed by: THORACIC SURGERY (CARDIOTHORACIC VASCULAR SURGERY)

## 2022-06-30 PROCEDURE — 33533 CABG ARTERIAL SINGLE: CPT | Performed by: THORACIC SURGERY (CARDIOTHORACIC VASCULAR SURGERY)

## 2022-06-30 PROCEDURE — 700101 HCHG RX REV CODE 250: Performed by: CLINICAL NURSE SPECIALIST

## 2022-06-30 PROCEDURE — B24BZZ4 ULTRASONOGRAPHY OF HEART WITH AORTA, TRANSESOPHAGEAL: ICD-10-PCS | Performed by: STUDENT IN AN ORGANIZED HEALTH CARE EDUCATION/TRAINING PROGRAM

## 2022-06-30 PROCEDURE — 160042 HCHG SURGERY MINUTES - EA ADDL 1 MIN LEVEL 5: Performed by: THORACIC SURGERY (CARDIOTHORACIC VASCULAR SURGERY)

## 2022-06-30 PROCEDURE — 36556 INSERT NON-TUNNEL CV CATH: CPT | Performed by: STUDENT IN AN ORGANIZED HEALTH CARE EDUCATION/TRAINING PROGRAM

## 2022-06-30 PROCEDURE — 33508 ENDOSCOPIC VEIN HARVEST: CPT | Mod: AS,59 | Performed by: CLINICAL NURSE SPECIALIST

## 2022-06-30 PROCEDURE — 33518 CABG ARTERY-VEIN TWO: CPT | Mod: AS | Performed by: CLINICAL NURSE SPECIALIST

## 2022-06-30 PROCEDURE — 36415 COLL VENOUS BLD VENIPUNCTURE: CPT

## 2022-06-30 PROCEDURE — 82803 BLOOD GASES ANY COMBINATION: CPT | Mod: 91

## 2022-06-30 PROCEDURE — 770022 HCHG ROOM/CARE - ICU (200)

## 2022-06-30 PROCEDURE — 93005 ELECTROCARDIOGRAM TRACING: CPT | Performed by: THORACIC SURGERY (CARDIOTHORACIC VASCULAR SURGERY)

## 2022-06-30 PROCEDURE — 99291 CRITICAL CARE FIRST HOUR: CPT | Performed by: STUDENT IN AN ORGANIZED HEALTH CARE EDUCATION/TRAINING PROGRAM

## 2022-06-30 PROCEDURE — 93005 ELECTROCARDIOGRAM TRACING: CPT | Performed by: CLINICAL NURSE SPECIALIST

## 2022-06-30 PROCEDURE — 85014 HEMATOCRIT: CPT | Mod: 91

## 2022-06-30 PROCEDURE — 82962 GLUCOSE BLOOD TEST: CPT | Mod: 91

## 2022-06-30 PROCEDURE — 83735 ASSAY OF MAGNESIUM: CPT

## 2022-06-30 PROCEDURE — A9270 NON-COVERED ITEM OR SERVICE: HCPCS | Performed by: THORACIC SURGERY (CARDIOTHORACIC VASCULAR SURGERY)

## 2022-06-30 PROCEDURE — C1894 INTRO/SHEATH, NON-LASER: HCPCS | Performed by: THORACIC SURGERY (CARDIOTHORACIC VASCULAR SURGERY)

## 2022-06-30 PROCEDURE — 86923 COMPATIBILITY TEST ELECTRIC: CPT

## 2022-06-30 PROCEDURE — 82330 ASSAY OF CALCIUM: CPT | Mod: 91

## 2022-06-30 PROCEDURE — 700101 HCHG RX REV CODE 250

## 2022-06-30 PROCEDURE — 85347 COAGULATION TIME ACTIVATED: CPT | Mod: 91

## 2022-06-30 PROCEDURE — 93320 DOPPLER ECHO COMPLETE: CPT | Mod: 26 | Performed by: STUDENT IN AN ORGANIZED HEALTH CARE EDUCATION/TRAINING PROGRAM

## 2022-06-30 PROCEDURE — P9016 RBC LEUKOCYTES REDUCED: HCPCS

## 2022-06-30 PROCEDURE — A9270 NON-COVERED ITEM OR SERVICE: HCPCS | Performed by: CLINICAL NURSE SPECIALIST

## 2022-06-30 PROCEDURE — 33508 ENDOSCOPIC VEIN HARVEST: CPT | Mod: 59 | Performed by: THORACIC SURGERY (CARDIOTHORACIC VASCULAR SURGERY)

## 2022-06-30 PROCEDURE — 33518 CABG ARTERY-VEIN TWO: CPT | Performed by: THORACIC SURGERY (CARDIOTHORACIC VASCULAR SURGERY)

## 2022-06-30 PROCEDURE — 700101 HCHG RX REV CODE 250: Performed by: THORACIC SURGERY (CARDIOTHORACIC VASCULAR SURGERY)

## 2022-06-30 PROCEDURE — 36430 TRANSFUSION BLD/BLD COMPNT: CPT

## 2022-06-30 PROCEDURE — 110371 HCHG SHELL REV 272: Performed by: THORACIC SURGERY (CARDIOTHORACIC VASCULAR SURGERY)

## 2022-06-30 PROCEDURE — 93010 ELECTROCARDIOGRAM REPORT: CPT | Performed by: INTERNAL MEDICINE

## 2022-06-30 PROCEDURE — 85610 PROTHROMBIN TIME: CPT

## 2022-06-30 PROCEDURE — 021109W BYPASS CORONARY ARTERY, TWO ARTERIES FROM AORTA WITH AUTOLOGOUS VENOUS TISSUE, OPEN APPROACH: ICD-10-PCS | Performed by: THORACIC SURGERY (CARDIOTHORACIC VASCULAR SURGERY)

## 2022-06-30 PROCEDURE — 5A1221Z PERFORMANCE OF CARDIAC OUTPUT, CONTINUOUS: ICD-10-PCS | Performed by: THORACIC SURGERY (CARDIOTHORACIC VASCULAR SURGERY)

## 2022-06-30 PROCEDURE — 160048 HCHG OR STATISTICAL LEVEL 1-5: Performed by: THORACIC SURGERY (CARDIOTHORACIC VASCULAR SURGERY)

## 2022-06-30 PROCEDURE — 700102 HCHG RX REV CODE 250 W/ 637 OVERRIDE(OP): Performed by: CLINICAL NURSE SPECIALIST

## 2022-06-30 PROCEDURE — 700101 HCHG RX REV CODE 250: Performed by: STUDENT IN AN ORGANIZED HEALTH CARE EDUCATION/TRAINING PROGRAM

## 2022-06-30 PROCEDURE — C1751 CATH, INF, PER/CENT/MIDLINE: HCPCS | Performed by: THORACIC SURGERY (CARDIOTHORACIC VASCULAR SURGERY)

## 2022-06-30 PROCEDURE — 84295 ASSAY OF SERUM SODIUM: CPT | Mod: 91

## 2022-06-30 PROCEDURE — 93312 ECHO TRANSESOPHAGEAL: CPT

## 2022-06-30 PROCEDURE — 36620 INSERTION CATHETER ARTERY: CPT | Performed by: STUDENT IN AN ORGANIZED HEALTH CARE EDUCATION/TRAINING PROGRAM

## 2022-06-30 PROCEDURE — 160009 HCHG ANES TIME/MIN: Performed by: THORACIC SURGERY (CARDIOTHORACIC VASCULAR SURGERY)

## 2022-06-30 PROCEDURE — 93312 ECHO TRANSESOPHAGEAL: CPT | Mod: 26,59 | Performed by: STUDENT IN AN ORGANIZED HEALTH CARE EDUCATION/TRAINING PROGRAM

## 2022-06-30 PROCEDURE — 700105 HCHG RX REV CODE 258: Performed by: STUDENT IN AN ORGANIZED HEALTH CARE EDUCATION/TRAINING PROGRAM

## 2022-06-30 RX ORDER — OXYCODONE HYDROCHLORIDE 10 MG/1
10 TABLET ORAL
Status: DISCONTINUED | OUTPATIENT
Start: 2022-06-30 | End: 2022-07-02

## 2022-06-30 RX ORDER — MAGNESIUM SULFATE 1 G/100ML
1 INJECTION INTRAVENOUS DAILY
Status: COMPLETED | OUTPATIENT
Start: 2022-06-30 | End: 2022-07-02

## 2022-06-30 RX ORDER — INSULIN LISPRO 100 [IU]/ML
0-14 INJECTION, SOLUTION INTRAVENOUS; SUBCUTANEOUS
Status: ACTIVE | OUTPATIENT
Start: 2022-06-30 | End: 2022-07-01

## 2022-06-30 RX ORDER — ONDANSETRON 2 MG/ML
8 INJECTION INTRAMUSCULAR; INTRAVENOUS EVERY 6 HOURS PRN
Status: DISCONTINUED | OUTPATIENT
Start: 2022-06-30 | End: 2022-07-06 | Stop reason: HOSPADM

## 2022-06-30 RX ORDER — FAMOTIDINE 20 MG/1
20 TABLET, FILM COATED ORAL 2 TIMES DAILY
COMMUNITY
End: 2024-01-25

## 2022-06-30 RX ORDER — AMOXICILLIN 250 MG
2 CAPSULE ORAL 2 TIMES DAILY
Status: DISCONTINUED | OUTPATIENT
Start: 2022-06-30 | End: 2022-07-06 | Stop reason: HOSPADM

## 2022-06-30 RX ORDER — DEXMEDETOMIDINE HYDROCHLORIDE 4 UG/ML
INJECTION, SOLUTION INTRAVENOUS
Status: DISCONTINUED | OUTPATIENT
Start: 2022-06-30 | End: 2022-06-30 | Stop reason: SURG

## 2022-06-30 RX ORDER — SODIUM CHLORIDE, SODIUM LACTATE, POTASSIUM CHLORIDE, CALCIUM CHLORIDE 600; 310; 30; 20 MG/100ML; MG/100ML; MG/100ML; MG/100ML
INJECTION, SOLUTION INTRAVENOUS
Status: DISCONTINUED | OUTPATIENT
Start: 2022-06-30 | End: 2022-06-30 | Stop reason: SURG

## 2022-06-30 RX ORDER — OMEPRAZOLE 20 MG/1
20 CAPSULE, DELAYED RELEASE ORAL DAILY
Status: DISCONTINUED | OUTPATIENT
Start: 2022-07-01 | End: 2022-07-06 | Stop reason: HOSPADM

## 2022-06-30 RX ORDER — CEFAZOLIN SODIUM 2 G/100ML
2 INJECTION, SOLUTION INTRAVENOUS
Status: DISCONTINUED | OUTPATIENT
Start: 2022-06-30 | End: 2022-06-30 | Stop reason: HOSPADM

## 2022-06-30 RX ORDER — MORPHINE SULFATE 4 MG/ML
4 INJECTION INTRAVENOUS
Status: DISCONTINUED | OUTPATIENT
Start: 2022-06-30 | End: 2022-07-01

## 2022-06-30 RX ORDER — CEFAZOLIN SODIUM 1 G/3ML
INJECTION, POWDER, FOR SOLUTION INTRAMUSCULAR; INTRAVENOUS PRN
Status: DISCONTINUED | OUTPATIENT
Start: 2022-06-30 | End: 2022-06-30 | Stop reason: SURG

## 2022-06-30 RX ORDER — NOREPINEPHRINE BITARTRATE 0.03 MG/ML
0-30 INJECTION, SOLUTION INTRAVENOUS CONTINUOUS
Status: DISCONTINUED | OUTPATIENT
Start: 2022-06-30 | End: 2022-07-01

## 2022-06-30 RX ORDER — PROCHLORPERAZINE EDISYLATE 5 MG/ML
10 INJECTION INTRAMUSCULAR; INTRAVENOUS EVERY 6 HOURS PRN
Status: DISCONTINUED | OUTPATIENT
Start: 2022-06-30 | End: 2022-07-06 | Stop reason: HOSPADM

## 2022-06-30 RX ORDER — DEXTROSE MONOHYDRATE 25 G/50ML
12.5-25 INJECTION, SOLUTION INTRAVENOUS PRN
Status: ACTIVE | OUTPATIENT
Start: 2022-06-30 | End: 2022-07-01

## 2022-06-30 RX ORDER — NITROGLYCERIN 0.4 MG/1
0.4 TABLET SUBLINGUAL PRN
COMMUNITY
Start: 2022-05-13 | End: 2023-11-22 | Stop reason: SDUPTHER

## 2022-06-30 RX ORDER — ROCURONIUM BROMIDE 10 MG/ML
INJECTION, SOLUTION INTRAVENOUS PRN
Status: DISCONTINUED | OUTPATIENT
Start: 2022-06-30 | End: 2022-06-30 | Stop reason: SURG

## 2022-06-30 RX ORDER — SODIUM CHLORIDE, SODIUM GLUCONATE, SODIUM ACETATE, POTASSIUM CHLORIDE AND MAGNESIUM CHLORIDE 526; 502; 368; 37; 30 MG/100ML; MG/100ML; MG/100ML; MG/100ML; MG/100ML
INJECTION, SOLUTION INTRAVENOUS PRN
Status: DISCONTINUED | OUTPATIENT
Start: 2022-06-30 | End: 2022-07-06 | Stop reason: HOSPADM

## 2022-06-30 RX ORDER — SODIUM CHLORIDE, SODIUM GLUCONATE, SODIUM ACETATE, POTASSIUM CHLORIDE AND MAGNESIUM CHLORIDE 526; 502; 368; 37; 30 MG/100ML; MG/100ML; MG/100ML; MG/100ML; MG/100ML
INJECTION, SOLUTION INTRAVENOUS
Status: DISCONTINUED | OUTPATIENT
Start: 2022-06-30 | End: 2022-06-30

## 2022-06-30 RX ORDER — PAPAVERINE HYDROCHLORIDE 30 MG/ML
INJECTION INTRAMUSCULAR; INTRAVENOUS
Status: DISCONTINUED | OUTPATIENT
Start: 2022-06-30 | End: 2022-06-30

## 2022-06-30 RX ORDER — POTASSIUM CHLORIDE 7.45 MG/ML
10 INJECTION INTRAVENOUS ONCE
Status: COMPLETED | OUTPATIENT
Start: 2022-07-01 | End: 2022-07-01

## 2022-06-30 RX ORDER — MIDAZOLAM HYDROCHLORIDE 1 MG/ML
2 INJECTION INTRAMUSCULAR; INTRAVENOUS
Status: DISCONTINUED | OUTPATIENT
Start: 2022-06-30 | End: 2022-07-01

## 2022-06-30 RX ORDER — SODIUM CHLORIDE 9 MG/ML
INJECTION, SOLUTION INTRAVENOUS CONTINUOUS
Status: DISCONTINUED | OUTPATIENT
Start: 2022-06-30 | End: 2022-07-01

## 2022-06-30 RX ORDER — PROTAMINE SULFATE 10 MG/ML
INJECTION, SOLUTION INTRAVENOUS PRN
Status: DISCONTINUED | OUTPATIENT
Start: 2022-06-30 | End: 2022-06-30 | Stop reason: SURG

## 2022-06-30 RX ORDER — PROMETHAZINE HYDROCHLORIDE 25 MG/1
25 SUPPOSITORY RECTAL EVERY 6 HOURS PRN
Status: DISCONTINUED | OUTPATIENT
Start: 2022-06-30 | End: 2022-07-06 | Stop reason: HOSPADM

## 2022-06-30 RX ORDER — ACETAMINOPHEN 325 MG/1
650 TABLET ORAL EVERY 4 HOURS PRN
Status: DISCONTINUED | OUTPATIENT
Start: 2022-06-30 | End: 2022-07-06 | Stop reason: HOSPADM

## 2022-06-30 RX ORDER — ATORVASTATIN CALCIUM 40 MG/1
40 TABLET, FILM COATED ORAL
Status: DISCONTINUED | OUTPATIENT
Start: 2022-06-30 | End: 2022-07-06 | Stop reason: HOSPADM

## 2022-06-30 RX ORDER — MIDAZOLAM HYDROCHLORIDE 1 MG/ML
INJECTION INTRAMUSCULAR; INTRAVENOUS PRN
Status: DISCONTINUED | OUTPATIENT
Start: 2022-06-30 | End: 2022-06-30 | Stop reason: SURG

## 2022-06-30 RX ORDER — SODIUM CHLORIDE 9 MG/ML
INJECTION, SOLUTION INTRAVENOUS
Status: DISCONTINUED | OUTPATIENT
Start: 2022-06-30 | End: 2022-06-30 | Stop reason: SURG

## 2022-06-30 RX ORDER — ALUMINA, MAGNESIA, AND SIMETHICONE 2400; 2400; 240 MG/30ML; MG/30ML; MG/30ML
30 SUSPENSION ORAL EVERY 4 HOURS PRN
Status: DISCONTINUED | OUTPATIENT
Start: 2022-06-30 | End: 2022-07-06 | Stop reason: HOSPADM

## 2022-06-30 RX ORDER — DEXMEDETOMIDINE HYDROCHLORIDE 4 UG/ML
0-1.5 INJECTION INTRAVENOUS CONTINUOUS
Status: DISCONTINUED | OUTPATIENT
Start: 2022-06-30 | End: 2022-07-01

## 2022-06-30 RX ORDER — HYDROXYZINE HYDROCHLORIDE 10 MG/1
10 TABLET, FILM COATED ORAL EVERY 4 HOURS PRN
Status: DISCONTINUED | OUTPATIENT
Start: 2022-06-30 | End: 2022-07-01

## 2022-06-30 RX ORDER — ACETAMINOPHEN 500 MG
1000 TABLET ORAL ONCE
Status: COMPLETED | OUTPATIENT
Start: 2022-06-30 | End: 2022-06-30

## 2022-06-30 RX ORDER — TRAMADOL HYDROCHLORIDE 50 MG/1
50 TABLET ORAL EVERY 4 HOURS PRN
Status: DISCONTINUED | OUTPATIENT
Start: 2022-06-30 | End: 2022-07-06 | Stop reason: HOSPADM

## 2022-06-30 RX ORDER — POTASSIUM CHLORIDE 7.45 MG/ML
10 INJECTION INTRAVENOUS ONCE
Status: COMPLETED | OUTPATIENT
Start: 2022-06-30 | End: 2022-06-30

## 2022-06-30 RX ORDER — ACETAMINOPHEN 500 MG
1000 TABLET ORAL EVERY 6 HOURS PRN
Status: DISCONTINUED | OUTPATIENT
Start: 2022-07-05 | End: 2022-07-02

## 2022-06-30 RX ORDER — HEPARIN SODIUM,PORCINE 1000/ML
VIAL (ML) INJECTION PRN
Status: DISCONTINUED | OUTPATIENT
Start: 2022-06-30 | End: 2022-06-30 | Stop reason: SURG

## 2022-06-30 RX ORDER — OXYCODONE HYDROCHLORIDE 5 MG/1
5 TABLET ORAL
Status: DISCONTINUED | OUTPATIENT
Start: 2022-06-30 | End: 2022-07-02

## 2022-06-30 RX ORDER — DIPHENHYDRAMINE HCL 25 MG
25 TABLET ORAL
Status: DISCONTINUED | OUTPATIENT
Start: 2022-06-30 | End: 2022-07-01

## 2022-06-30 RX ORDER — CLOPIDOGREL BISULFATE 75 MG/1
75 TABLET ORAL DAILY
Status: DISCONTINUED | OUTPATIENT
Start: 2022-07-01 | End: 2022-07-05

## 2022-06-30 RX ORDER — POLYETHYLENE GLYCOL 3350 17 G/17G
1 POWDER, FOR SOLUTION ORAL DAILY
Status: DISCONTINUED | OUTPATIENT
Start: 2022-07-01 | End: 2022-07-06 | Stop reason: HOSPADM

## 2022-06-30 RX ORDER — BISACODYL 10 MG
10 SUPPOSITORY, RECTAL RECTAL
Status: DISCONTINUED | OUTPATIENT
Start: 2022-06-30 | End: 2022-07-06 | Stop reason: HOSPADM

## 2022-06-30 RX ORDER — ACETAMINOPHEN 500 MG
1000 TABLET ORAL EVERY 6 HOURS
Status: DISPENSED | OUTPATIENT
Start: 2022-06-30 | End: 2022-07-05

## 2022-06-30 RX ORDER — EPINEPHRINE HCL IN 0.9 % NACL 4MG/250ML
0-.2 PLASTIC BAG, INJECTION (ML) INTRAVENOUS CONTINUOUS
Status: DISCONTINUED | OUTPATIENT
Start: 2022-06-30 | End: 2022-07-01

## 2022-06-30 RX ORDER — ACETAMINOPHEN 650 MG/1
650 SUPPOSITORY RECTAL EVERY 4 HOURS PRN
Status: DISCONTINUED | OUTPATIENT
Start: 2022-06-30 | End: 2022-07-06 | Stop reason: HOSPADM

## 2022-06-30 RX ORDER — CEFAZOLIN SODIUM 2 G/100ML
2 INJECTION, SOLUTION INTRAVENOUS ONCE
Status: COMPLETED | OUTPATIENT
Start: 2022-06-30 | End: 2022-06-30

## 2022-06-30 RX ADMIN — SODIUM CHLORIDE: 9 INJECTION, SOLUTION INTRAVENOUS at 12:36

## 2022-06-30 RX ADMIN — SODIUM CHLORIDE, SODIUM GLUCONATE, SODIUM ACETATE, POTASSIUM CHLORIDE AND MAGNESIUM CHLORIDE: 526; 502; 368; 37; 30 INJECTION, SOLUTION INTRAVENOUS at 16:43

## 2022-06-30 RX ADMIN — FENTANYL CITRATE 100 MCG: 50 INJECTION, SOLUTION INTRAMUSCULAR; INTRAVENOUS at 08:21

## 2022-06-30 RX ADMIN — EPHEDRINE SULFATE 5 MG: 50 INJECTION INTRAMUSCULAR; INTRAVENOUS; SUBCUTANEOUS at 09:32

## 2022-06-30 RX ADMIN — METHADONE HYDROCHLORIDE 10 MG: 10 INJECTION, SOLUTION INTRAMUSCULAR; INTRAVENOUS; SUBCUTANEOUS at 09:58

## 2022-06-30 RX ADMIN — SODIUM CHLORIDE, SODIUM GLUCONATE, SODIUM ACETATE, POTASSIUM CHLORIDE AND MAGNESIUM CHLORIDE: 526; 502; 368; 37; 30 INJECTION, SOLUTION INTRAVENOUS at 08:10

## 2022-06-30 RX ADMIN — HEPARIN SODIUM 31000 UNITS: 1000 INJECTION, SOLUTION INTRAVENOUS; SUBCUTANEOUS at 09:27

## 2022-06-30 RX ADMIN — LIDOCAINE HYDROCHLORIDE 0.5 ML: 10 INJECTION, SOLUTION EPIDURAL; INFILTRATION; INTRACAUDAL; PERINEURAL at 07:02

## 2022-06-30 RX ADMIN — ACETAMINOPHEN 1000 MG: 500 TABLET, FILM COATED ORAL at 23:35

## 2022-06-30 RX ADMIN — SENNOSIDES AND DOCUSATE SODIUM 2 TABLET: 50; 8.6 TABLET ORAL at 18:21

## 2022-06-30 RX ADMIN — METOPROLOL TARTRATE 12.5 MG: 25 TABLET, FILM COATED ORAL at 06:58

## 2022-06-30 RX ADMIN — PROTAMINE SULFATE 310 MG: 10 INJECTION, SOLUTION INTRAVENOUS at 11:26

## 2022-06-30 RX ADMIN — SODIUM CHLORIDE: 9 INJECTION, SOLUTION INTRAVENOUS at 08:10

## 2022-06-30 RX ADMIN — OXYCODONE HYDROCHLORIDE 10 MG: 10 TABLET ORAL at 22:06

## 2022-06-30 RX ADMIN — TRAMADOL HYDROCHLORIDE 50 MG: 50 TABLET, COATED ORAL at 18:21

## 2022-06-30 RX ADMIN — OXYCODONE 5 MG: 5 TABLET ORAL at 19:10

## 2022-06-30 RX ADMIN — ROCURONIUM BROMIDE 30 MG: 10 INJECTION, SOLUTION INTRAVENOUS at 11:38

## 2022-06-30 RX ADMIN — CEFAZOLIN 2 G: 330 INJECTION, POWDER, FOR SOLUTION INTRAMUSCULAR; INTRAVENOUS at 11:53

## 2022-06-30 RX ADMIN — ATORVASTATIN CALCIUM 40 MG: 40 TABLET, FILM COATED ORAL at 20:09

## 2022-06-30 RX ADMIN — EPHEDRINE SULFATE 5 MG: 50 INJECTION INTRAMUSCULAR; INTRAVENOUS; SUBCUTANEOUS at 08:33

## 2022-06-30 RX ADMIN — SODIUM CHLORIDE 1 UNITS/HR: 9 INJECTION, SOLUTION INTRAVENOUS at 10:52

## 2022-06-30 RX ADMIN — ACETAMINOPHEN 1000 MG: 500 TABLET, FILM COATED ORAL at 18:21

## 2022-06-30 RX ADMIN — DEXMEDETOMIDINE HYDROCHLORIDE 0.5 MCG/KG/HR: 100 INJECTION, SOLUTION INTRAVENOUS at 08:30

## 2022-06-30 RX ADMIN — AMINOCAPROIC ACID 1 G/HR: 250 INJECTION, SOLUTION INTRAVENOUS at 09:39

## 2022-06-30 RX ADMIN — ROCURONIUM BROMIDE 70 MG: 10 INJECTION, SOLUTION INTRAVENOUS at 08:02

## 2022-06-30 RX ADMIN — PROPOFOL 50 MG: 10 INJECTION, EMULSION INTRAVENOUS at 08:37

## 2022-06-30 RX ADMIN — CEFAZOLIN SODIUM 2 G: 2 INJECTION, SOLUTION INTRAVENOUS at 20:09

## 2022-06-30 RX ADMIN — POTASSIUM CHLORIDE 10 MEQ: 7.46 INJECTION, SOLUTION INTRAVENOUS at 23:36

## 2022-06-30 RX ADMIN — AMINOCAPROIC ACID 7.7 G: 250 INJECTION, SOLUTION INTRAVENOUS at 08:30

## 2022-06-30 RX ADMIN — SODIUM CHLORIDE, POTASSIUM CHLORIDE, SODIUM LACTATE AND CALCIUM CHLORIDE: 600; 310; 30; 20 INJECTION, SOLUTION INTRAVENOUS at 07:53

## 2022-06-30 RX ADMIN — POTASSIUM CHLORIDE 10 MEQ: 7.46 INJECTION, SOLUTION INTRAVENOUS at 19:04

## 2022-06-30 RX ADMIN — MAGNESIUM SULFATE HEPTAHYDRATE 1 G: 1 INJECTION, SOLUTION INTRAVENOUS at 12:40

## 2022-06-30 RX ADMIN — CEFAZOLIN 2 G: 330 INJECTION, POWDER, FOR SOLUTION INTRAMUSCULAR; INTRAVENOUS at 08:02

## 2022-06-30 RX ADMIN — CLEVIPIDINE 5 MG/HR: 0.5 EMULSION INTRAVENOUS at 08:40

## 2022-06-30 RX ADMIN — FENTANYL CITRATE 50 MCG: 50 INJECTION, SOLUTION INTRAMUSCULAR; INTRAVENOUS at 08:38

## 2022-06-30 RX ADMIN — MIDAZOLAM HYDROCHLORIDE 2 MG: 1 INJECTION, SOLUTION INTRAMUSCULAR; INTRAVENOUS at 07:53

## 2022-06-30 RX ADMIN — ACETAMINOPHEN 1000 MG: 500 TABLET ORAL at 06:57

## 2022-06-30 RX ADMIN — FENTANYL CITRATE 100 MCG: 50 INJECTION, SOLUTION INTRAMUSCULAR; INTRAVENOUS at 08:01

## 2022-06-30 RX ADMIN — METHADONE HYDROCHLORIDE 10 MG: 10 INJECTION, SOLUTION INTRAMUSCULAR; INTRAVENOUS; SUBCUTANEOUS at 08:02

## 2022-06-30 RX ADMIN — NOREPINEPHRINE BITARTRATE 2 MCG/MIN: 1 INJECTION INTRAVENOUS at 14:39

## 2022-06-30 RX ADMIN — PROPOFOL 100 MG: 10 INJECTION, EMULSION INTRAVENOUS at 08:01

## 2022-06-30 ASSESSMENT — COPD QUESTIONNAIRES
DURING THE PAST 4 WEEKS HOW MUCH DID YOU FEEL SHORT OF BREATH: NONE/LITTLE OF THE TIME
COPD SCREENING SCORE: 4
HAVE YOU SMOKED AT LEAST 100 CIGARETTES IN YOUR ENTIRE LIFE: YES
DO YOU EVER COUGH UP ANY MUCUS OR PHLEGM?: NO/ONLY WITH OCCASIONAL COLDS OR INFECTIONS

## 2022-06-30 ASSESSMENT — FIBROSIS 4 INDEX: FIB4 SCORE: 0.56

## 2022-06-30 ASSESSMENT — PAIN SCALES - GENERAL: PAIN_LEVEL: 0

## 2022-06-30 NOTE — PROGRESS NOTES
Pt met parameters for extubation: hemodynamically stable on 1 pressors, SpO2 99%, VC 1.5, NIF -40.    Pt extubated at 1645 to 4L NC. No stridor noted. Tolerating well.

## 2022-06-30 NOTE — ANESTHESIA TIME REPORT
Anesthesia Start and Stop Event Times     Date Time Event    6/30/2022 0725 Ready for Procedure     0752 Anesthesia Start     1223 Anesthesia Stop        Responsible Staff  06/30/22    Name Role Begin End    Betty Miller M.D. Anesth 0752 1223        Overtime Reason:  no overtime (within assigned shift)    Comments:

## 2022-06-30 NOTE — ANESTHESIA PROCEDURE NOTES
SACHA    Date/Time: 6/30/2022 8:15 AM  Performed by: Betty Miller M.D.  Authorized by: Betty Miller M.D.     Start Time:6/30/2022 8:15 AM  Preanesthetic Checklist: patient identified, IV checked, site marked, risks and benefits discussed, surgical consent, monitors and equipment checked, pre-op evaluation and timeout performed    Indication for SACHA: diagnostic   Patient Location: OR  Intubated: Yes  Bite Block: Yes  Heart Visualized: Yes  Insertion: atraumatic    **See FULL SACHA report in patient's chart via CV Synapse**

## 2022-06-30 NOTE — ASSESSMENT & PLAN NOTE
Procedure: CABG x3  *LIMA-LAD  *SVG-diagonal 1  *SVG-OM1  Date of surgery: 6/30  Surgeon: Dr Lama    Extubated  Weaning pressors  Out of bed to chair

## 2022-06-30 NOTE — ANESTHESIA PROCEDURE NOTES
Arterial Line  Performed by: Betty Miller M.D.  Authorized by: Betty Miller M.D.     Start Time:  6/30/2022 7:59 AM  End Time:  6/30/2022 8:02 AM  Localization: ultrasound guidance  Image captured, interpreted and electronically stored.  Patient Location:  OR  Indication: continuous blood pressure monitoring        Catheter Size:  20 G  Seldinger Technique?: Yes    Laterality:  Left  Site:  Radial artery  Line Secured:  Antimicrobial disc, tape and transparent dressing  Events: patient tolerated procedure well with no complications

## 2022-06-30 NOTE — PROGRESS NOTES
Pt brought up to room by OR team. Dr. Ma at bedside, report received from Dr. Miller. Pt is on cleviprex for BP management, precedex for sedation, and insulin. First unit of PRBC infusing. VSS. V-wires in place, pacing not required but capture achieved. Mediastinal CT to suction, no airleak noted. EKG and ABG obtained and reviewed.    30-Nov-2018 10:38

## 2022-06-30 NOTE — ANESTHESIA POSTPROCEDURE EVALUATION
Patient: Rony Chilel    Procedure Summary     Date: 06/30/22 Room / Location: [facility] 03 / SURGERY [facility]    Anesthesia Start: 0752 Anesthesia Stop: 1223    Procedures:       CABG, WITH ENDOSCOPIC VEIN PROCUREMENT-X3 (Chest)      ECHOCARDIOGRAM, TRANSESOPHAGEAL, INTRAOPERATIVE (Mouth) Diagnosis: (CORONARY ARTERY DISEASE)    Surgeons: Irvin Lama D.O. Responsible Provider: Betty Miller M.D.    Anesthesia Type: general ASA Status: 4          Final Anesthesia Type: general  Last vitals  BP   Blood Pressure : (!) 98/52, Arterial BP: (!) 96/54    Temp   36.1 °C (97 °F)    Pulse   62   Resp   (!) 24    SpO2   98 %      Anesthesia Post Evaluation    Patient location during evaluation: PACU  Patient participation: complete - patient cannot participate  Level of consciousness: obtunded/minimal responses  Pain score: 0    Airway patency: patent  Anesthetic complications: no  Cardiovascular status: hemodynamically stable  Respiratory status: acceptable, ETT and intubated  Hydration status: euvolemic    PONV: none  patient was unable to participate        No complications documented.

## 2022-06-30 NOTE — ANESTHESIA PREPROCEDURE EVALUATION
Case: [id number] Date/Time: 06/30/22 0715    Procedures:       CABG, WITH ENDOSCOPIC VEIN PROCUREMENT-X2-3      ECHOCARDIOGRAM, TRANSESOPHAGEAL, INTRAOPERATIVE    Pre-op diagnosis: CORONARY ARTERY DISEASE    Location: [facility] OR 03 / SURGERY [facility]    Surgeons: Irvin Lama D.O.        68 yo woman p/w STEMI found to have 100% and 80% occlusion in prox and distal RCA respectively s/p stents at outside hospital.    ECHO ([facility]) 5/13/22:  LV normal in size and function, EF 55%. No regional wall motion abnormalities. Grade 1 diastolic dysfunction. No VSD or apical thrombus. RV normal in size and function. RVSP 27mmHg.    Relevant Problems   CARDIAC   (positive) Hypertension, benign       Physical Exam    Airway   Mallampati: II  TM distance: >3 FB  Neck ROM: full       Cardiovascular - normal exam  Rhythm: regular  Rate: normal  (-) murmur     Dental - normal exam           Pulmonary - normal exam  Breath sounds clear to auscultation     Abdominal    Neurological - normal exam         Other findings: No loose teeth          Anesthesia Plan    ASA 4   ASA physical status 4 criteria: CAD/stents - recent (< 3 months) and MI - recent (< 3 months)    Plan - general       Airway plan will be ETT  SACHA Planned        Induction: intravenous    Postoperative Plan: Postoperative administration of opioids is intended.    Pertinent diagnostic labs and testing reviewed    Informed Consent:    Anesthetic plan and risks discussed with patient.    Use of blood products discussed with: patient whom consented to blood products.

## 2022-06-30 NOTE — ANESTHESIA PROCEDURE NOTES
Central Venous Line  Performed by: Betty Miller M.D.  Authorized by: Betty Miller M.D.     Start Time:  6/30/2022 8:10 AM  End Time:  6/30/2022 8:15 AM  Patient Location:  OR  Indication: central venous access        provider hand hygiene performed prior to central venous catheter insertion, all 5 sterile barriers used (gloves, gown, cap, mask, large sterile drape) during central venous catheter insertion and skin prep agent completely dried prior to procedure    Patient Position:  Trendelenburg  Laterality:  Right  Site:  Internal jugular  Prep:  Chlorhexidine  Catheter Size:  8 Fr  Catheter Length (cm):  15  Catheter Type:  Introducer  Number of Lumens:  Double lumen  target vein identified, needle advanced into vein and blood aspirated and guidewire advanced into vein    Seldinger Technique?: Yes    Ultrasound-Guided: ultrasound-guided  Image captured, interpreted and electronically stored.  Sterile Gel and Probe Cover Used for Ultrasound?: Yes    Intravenous Verification: verified by ultrasound, venous blood return and chest x-ray pending    all ports aspirated, all ports flushed easily, guidewire was removed intact, biopatch was applied, line was sutured in place and dressing was applied    Events: patient tolerated procedure well with no complications

## 2022-06-30 NOTE — ASSESSMENT & PLAN NOTE
Intubated date: 6/30  Goal saturation > 90%  Monitor ventilator waveforms and titrate flow/peep and volumes according.   Lung protective ventilation strategy  CXR PRN: monitor lung volumes and tube/line placement  VAP bundle prevention, oral care, post pyloric feeding  Head of bed > 30 degree  GI prophylaxis: PPI  Daily awakening and SBT trials unless contraindicated  Assess / Treat pain  Assess / Treat delirium  Sedation Goal: RASS 0 to +1  Monitor for liberation / early mobility  Respiratory treatments: prn  ABCDEF Bundle

## 2022-06-30 NOTE — OP REPORT
Operative Report    PreOp Diagnosis: History of STEMI status post PCI, multivessel coronary artery disease, stable angina, hypertension, hyperlipidemia      PostOp Diagnosis: Same as above      Procedure(s):  CABG x3 with skeletonized LIMA to LAD, reverse saphenous vein graft to diagonal 1, reverse saphenous vein graft to OM 1  WITH ENDOSCOPIC VEIN PROCUREMENT of right greater saphenous vein- Wound Class: Clean with Drain  ECHOCARDIOGRAM, TRANSESOPHAGEAL, INTRAOPERATIVE - Wound Class: None    Surgeon(s):  Irvin Lama D.O.    Anesthesiologist/Type of Anesthesia:  Anesthesiologist: Betty Miller M.D./General    Surgical Staff:  Assistant: JORGE Alan  Circulator: Joyce Valera R.N.  Perfusionist: Nichole Gutierrez Circulator: Hiro Moon R.N.  Scrub Person: Ivis Nunn; Betty Holman    Specimens removed if any:  * No specimens in log *    Estimated Blood Loss: Cardiopulmonary bypass    Findings:     Preoperative echo showed normal ejection fraction with no wall motion abnormalities.  There was trivial mitral regurgitation.  Postoperative echo showed normal wall motion abnormality and normal ejection fraction.  Mild mitral regurgitation was noted, prior to closing the incision it had returned to trivial.  On both pre and post she was noted to have left atrial enlargement    Mammary artery was 1.75 mm in size with excellent flow.  Saphenous vein was 2 to 3 mm with occasional varicosities and normal wall thickness.    LAD was thick-walled and fragile.  Proximal calcification noted.  It was 2.5 mm in size.  Diagonal 1 was 1.25 to 1.5 mm in size and torturous.  OM1 was a large vessel with proximal calcifications.  OM 2 was too small for bypass.  It was less than 1 mm.    Flows down the vein grafts were checked with cardioplegia instillation.  Flow down the OM1 was 80 mL/min at a pressure of 150 mmHg.  Flow down the diagonal was 60 mL/min at same pressure. all 3 vessels were noted to  have flow visualized distally indicating patency of the anastomosis.  All 3 had good Doppler signals at the end of the case.    Patient did not require defibrillation with cross-clamp removal, she weaned from bypass without issue.  She had good capture with her right ventricular pacing leads.    Complications: None immediate    Patient condition: Guarded to ICU    Chest tubes: Mediastinal: 32 [place] x2    Endoscopic vein harvest: Rightgreater saphenous vein.    Pacing wires: Right ventricular x2    Pericardium: Open    Cross-clamp time: 47 minutes    Pump time: 75 minutes    Cardioplegia: Cold blood antegrade cardioplegia given.  Initial induction with 1000 mL, redosed every 20 minutes with 300 mL.    Vent: Aortic root      Procedure:    After informed consent was obtained, the patient was brought to the operating room.  They were placed in supine position on the operating table.  General anesthesia was induced via endotracheal tube.  Lines, arterial line, Valles were placed by the nursing and anesthesia teams.  They received pre-incision antibiotics and beta-blockade.  The patient was prepped in a sterile fashion from their chin to their feet.  Drapes were placed in a sterile fashion.  The procedure was begun with a timeout.    I began the procedure by making a median sternotomy incision with a 10 blade scalpel.  Simultaneous to this, RUTH Thapa harvested the right greater saphenous vein in endoscopic fashion.  When she had removed it from the leg, she checked to make sure the tunnel in the  leg had no bleeding.  She then closed this in layers using absorbable sutures.  She then prepped the vein on the back table for use as conduit later in the case.  After she finished her, she joined me at the head of the table to assist with cannulation, retraction and exposure for creation of the proximal distal anastomoses, decannulation, and closure.        I deepened my sternal incision with left cautery to the  sternum.  The sternum was divided in the midline using the sternal saw.  Hemostasis of the sternal edges was obtained using bone putty and electrocautery.  The mammary retractor was brought up at that point and the left hemisternum was elevated.  Using the electrocautery with occasional clips for side branches, the mammary was dissected down from its takeoff at the subclavian down to its bifurcation into the muscular phrenic and inferior epigastric, in a skeletonized fashion.    Once the mammary was completely dissected out, systemic heparinization was performed, using 400 units/kg of heparin, to achieve an ACT greater than 480 seconds. The mammary was ligated distally and divided.  There was excellent flow noted.  I placed a small clip at the distal end of the mammary, treated it with papaverine, and placed it back in the left chest for safe keeping.  The distal stump was secured with a 0 silk free tie and 2 large clips.  The mammary retractor was removed and sternal retractor was placed in the chest open.    With the chest open, I then dissected out the innominate vein and the pericardium.  I then opened the pericardium and inverted T fashion.  I created pericardial well, suspending the pericardial edges from the skin edges using interrupted 2-0 Ethibond sutures.  Inspection of the heart and the aorta revealed no obvious pathology.  Palpation of the aorta revealed no calcifications precluding cannulation or cross-clamp.  I dissected the pericardium away from the distal ascending aorta adjacent to the innominate artery.  I then placed 2 pursestring sutures here using 2-0 Ethibond sutures with pledget reinforcement.  Following that, I then placed a pursestring on the right atrial appendage.  I then proceeded with cannulation.  I cannulated the aorta using a direct Seldinger's technique and SACHA guidance.  SACHA confirmed correct positioning of the cannula.  Next, I placed the two-stage venous cannula into the IVC via  the right atrium.  Again this was confirmed with SACHA.  Next, I then placed a pursestring on the mid ascending aorta, using a 3-0 Prolene suture. Through this I placed my antegrade needle.      With my cannulas in place, I then inspected the vein.  It was of reasonable quality.  The distal end was spatulated and it was marked to help with orienting the graft.  Cardiopulmonary bypass was then initiated.    Once on full flow, I then retracted the heart to inspect my targets.  Findings of the coronary targets were noted above.  These were each marked with the [place] blade.  I then dissected out the IVC and brought the heart strep through the oblique sinus followed by running it through the transverse sinus.  The heart strep was used throughout the case to help position the heart.  Finally, I dissected the pulmonary artery away from the distal portion of the aorta to facilitate cross-clamp placement and complete isolation of the heart.  I then placed a cross-clamp and the heart was arrested.  Ice slush was placed on the heart to assist with cooling.    After I had arrest, the heart was repositioned to expose the OM1.  I dissected this out with the [place] blade.  I then opened it longitudinally with 11 blade scalpel.  I extended the arteriotomy with coronary Restrepo scissors.  And end vein to side artery anastomosis was created with a running 7-0 Prolene suture.  The vein graft was connected to the cardioplegia system, and cardioplegia was given as I tied down the anastomosis.  There was excellent flow noted and good hemostasis.  It was then brought around the left atrium to the aorta measured appropriately and then divided.  The proximal end of the vein graft was then spatulated.    The heart was then repositioned to expose the diagonal 1 branch.  I dissected this out with the [place] blade, and then opened it with the 11 blade in a longitudinal fashion.  I extended the arteriotomy with Restrepo scissors.  An end vein to side  artery anastomosis was created with a running 7-0 Prolene suture.  Upon completion, the vein graft was connected to the cardioplegia circuit and cardioplegia given as I tied down the anastomosis.  Excellent flow was noted and there was good hemostasis.  I then used vein solution to distend the vein and measured out to the aorta.  I divided it proximally and spatulated it for creation of an another anastomosis.      The heart was repositioned again to expose the LAD.  I dissected this out with a [place] blade and opened it with 11 blade scalpel.  I extended the arteriotomy with Restrepo scissors.  I then created a keyhole defect in the pericardium through which I brought my mammary pedicle.  I then sized it to the site of the anastomosis and divided mammary pedicle, discarding the distal portion.  I then fashioned the distal end of the mammary pedicle for creation of the anastomosis.  An end LIMA to side LAD anastomosis was created with a running 7-0 Prolene suture.  Upon completion, the pedicle was opened and flow was noted to run distal.  It was also noted to be hemostatic.  Bulldog clamp was reapplied and I tacked the pedicle to the epicardium with interrupted 5-0 Prolene sutures.  2 ventricular pacing leads were placed in the usual fashion.  Rewarming was begun.    Flows were dropped, vents turned up, the patient de-aired and cross-clamp was removed.  Bulldog clamp was also removed from the mammary pedicle.  Flows were dropped again, and side-biting clamp placed.  The antegrade needle was removed and that site was upsized to a 4 mm punch.  A second arteriotomy was created with 11 blade scalpel and a 4 mm punch.  Proximal anastomoses were created to the 2 vein grafts in the usual fashion both with running 6-0 Prolene sutures.  Coronary markers were placed.  De-airing was performed, flows dropped, and side-biting clamp removed.    She was then weaned off cardiopulmonary bypass without issue.  Protamine was administered  to reverse systemic heparinization.  Doppler was used to check each graft and they were found to have excellent signals. I proceeded with placement of the mediastinal chest tubes, which were brought in through stab incisions on the epigastrium.  They were positioned around the heart and pleural space for good drainage.  They were secured with heavy silk sutures.  All sites were checked for hemostasis and found to be adequate.  The sternum was then reapproximated with interrupted stainless steel wires.  Topical hemostatic agent was placed between the wires to assist with hemostasis of the sternum.  The sternotomy incision itself was then closed in layers using absorbable sutures.  The patient tolerated the procedure well, all instrument counts were correct x2, he was taken to the ICU in guarded condition.          6/30/2022 11:54 AM Irvin Lama D.O.

## 2022-06-30 NOTE — CONSULTS
Critical Care History & Physical    Date of consult: 06/30/22    Referring Physician  JADE Castaneda*    Reason for Consultation  No chief complaint on file.      History of Presenting Illness  67 y.o. female with a history of hypertension, hyperlipidemia who had STEMI in 5/2022 and received ALCON to the RCA, also found to have significant multivessel disease and was referred for CABG.  Admitted to hospital 6/30 for CABG x3.    Code Status  Full Code    Review of Systems  Review of Systems   Unable to perform ROS: Intubated       Past Medical History   has a past medical history of CAD (coronary artery disease), Hyperlipidemia, Hypertension, PONV (postoperative nausea and vomiting), and Psychiatric disorder.    Surgical History   has a past surgical history that includes cholecystectomy and mammoplasty augmentation (Bilateral).    Family History  Reviewed and not pertinent    Social History   reports that she quit smoking about 3 weeks ago. Her smoking use included cigarettes. She has a 7.50 pack-year smoking history. She has never used smokeless tobacco. She reports current drug use. She reports that she does not drink alcohol.    Medications  Home Medications     Reviewed by Joyce Valera R.N. (Registered Nurse) on 06/30/22 at 0847  Med List Status: Complete   Medication Last Dose Status   acetaminophen (TYLENOL) 500 MG Tab 6/29/2022 Active   aminocaproic acid (AMICAR) 5 g in  mL Infusion  Active   aminocaproic acid (AMICAR) 7.7 g in  mL IV Bolus  Active   aspirin 81 MG EC tablet 6/29/2022 Active   atorvastatin (LIPITOR) 40 MG Tab 6/29/2022 Active   BRILINTA 90 MG Tab tablet 6/23/2022 Active   ceFAZolin in dextrose (Ancef) IVPB premix 2 g  Active   citalopram (CELEXA) 20 MG Tab 6/29/2022 Active   dexmedetomidine (Precedex) 400 mcg/100mL D5W premix infusion  Active   ePHEDrine injection  Active   EPINEPHrine (Adrenalin) infusion 4 mg/250 mL (premix)  Active   famotidine (PEPCID) 20 MG Tab  6/29/2022 Active   fentaNYL (SUBLIMAZE) injection  Active   insulin regular (HumuLIN R) 100 Units in  mL Infusion  Active   lidocaine (XYLOCAINE) 1 % injection 0.5 mL  Active   methadone 10 mg/mL (Dolophine) injection 20 mg  Active   metoprolol tartrate (LOPRESSOR) 25 MG Tab 6/29/2022 Active   nitroglycerin (NITROSTAT) 0.4 MG SL Tab 6/29/2022 Active   norepinephrine (Levophed) 8 mg in 250 mL NS infusion (premix)  Active   propofol (DIPRIVAN) injection  Active                Allergies  Allergies   Allergen Reactions   • Oxycodone Itching   • Dilaudid [Hydromorphone] Itching   • Morphine Vomiting and Nausea         Vital Signs last 24 hours  Temp:  [36.2 °C (97.2 °F)] 36.2 °C (97.2 °F)  Pulse:  [66-77] 66  Resp:  [16-43] 24  BP: ()/(52-70) 91/53  SpO2:  [95 %-100 %] 98 %      Physical Exam  Physical Exam  Vitals and nursing note reviewed. Exam conducted with a chaperone present.   Constitutional:       General: She is not in acute distress.     Interventions: She is intubated.   HENT:      Head: Normocephalic.      Mouth/Throat:      Mouth: Mucous membranes are moist.   Eyes:      Extraocular Movements: Extraocular movements intact.   Cardiovascular:      Comments:   Mediastinal tubes in place, no active bleeding  Epicardial wires in place  Pulmonary:      Effort: Pulmonary effort is normal. No respiratory distress. She is intubated.   Abdominal:      General: There is no distension.      Palpations: Abdomen is soft.      Tenderness: There is no abdominal tenderness.   Musculoskeletal:         General: Normal range of motion.      Cervical back: Neck supple. No rigidity.   Skin:     General: Skin is warm and dry.      Capillary Refill: Capillary refill takes less than 2 seconds.   Neurological:      Comments: RASS -5           Fluids    Intake/Output Summary (Last 24 hours) at 6/30/2022 1258  Last data filed at 6/30/2022 1222  Gross per 24 hour   Intake 1900 ml   Output 1200 ml   Net 700 ml          Laboratory  Recent Results (from the past 48 hour(s))   COMPONENT CELLULAR    Collection Time: 22  4:00 AM   Result Value Ref Range    Component R       R99                 Red Cells, LR       [id number]   issued       22   11:47      Product Type R99     Dispense Status issued     Unit Number (Barcoded) [id number]     Product Code (Barcoded) K8116U69     Blood Type (Barcoded) 5100     Component R       R99                 Red Cells, LR       [id number]   issued       22   12:50      Product Type R99     Dispense Status issued     Unit Number (Barcoded) [id number]     Product Code (Barcoded) W8432B46     Blood Type (Barcoded) 5100    ABO Rh Confirm    Collection Time: 22  7:10 AM   Result Value Ref Range    ABO Rh Confirm O POS    EKG    Collection Time: 22  7:42 AM   Result Value Ref Range    Report       [facility]    Test Date:  2022  Pt Name:    ROSEMARIE ANTONIO                  Department: [facility]  MRN:        [id number]                      Room:       [facility]  Gender:     Female                       Technician: JOVANI  :        [id number]                   Requested By:LUIZ PARADA  Order #:    [id number]                    Reading MD:    Measurements  Intervals                                Axis  Rate:       67                           P:          76  DC:         201                          QRS:        30  QRSD:       116                          T:          5  QT:         421  QTc:        445    Interpretive Statements  PACEMAKER SPIKES OR ARTIFACTS  SINUS RHYTHM  NONSPECIFIC INTRAVENTRICULAR CONDUCTION DELAY  Compared to ECG 2022 12:15:34  Intraventricular conduction delay now present  T-wave abnormality no longer present     POCT glucose device results    Collection Time: 22  8:24 AM   Result Value Ref Range    POC Glucose, Blood 145 (H) 65 - 99 mg/dL   POCT arterial blood gas device results    Collection Time: 22  8:27 AM    Result Value Ref Range    Ph 7.370 (L) 7.400 - 7.500    Pco2 40.7 (H) 26.0 - 37.0 mmHg    Po2 356 (H) 64 - 87 mmHg    Tco2 25 20 - 33 mmol/L    S02 100 (H) 93 - 99 %    Hco3 23.5 17.0 - 25.0 mmol/L    BE -2 -4 - 3 mmol/L    Body Temp see below degrees    Specimen Arterial    POCT sodium device results    Collection Time: 06/30/22  8:27 AM   Result Value Ref Range    Istat Sodium 136 135 - 145 mmol/L   POCT potassium device results    Collection Time: 06/30/22  8:27 AM   Result Value Ref Range    Istat Potassium 3.4 (L) 3.6 - 5.5 mmol/L   POCT ionized CA device results    Collection Time: 06/30/22  8:27 AM   Result Value Ref Range    Istat Ionized Calcium 1.23 1.10 - 1.30 mmol/L   POCT hematocrit and hemoglobin device results    Collection Time: 06/30/22  8:27 AM   Result Value Ref Range    Istat Hematocrit 32 (L) 37 - 47 %    Istat Hemoglobin 10.9 (L) 12.0 - 16.0 g/dL   POCT activated clotting time device results    Collection Time: 06/30/22  8:27 AM   Result Value Ref Range    Istat Activated Clotting Time 138 (H) 74 - 137 sec   POCT activated clotting time device results    Collection Time: 06/30/22  9:33 AM   Result Value Ref Range    Istat Activated Clotting Time 764 (H) 74 - 137 sec   POCT glucose device results    Collection Time: 06/30/22 10:15 AM   Result Value Ref Range    POC Glucose, Blood 173 (H) 65 - 99 mg/dL   POCT activated clotting time device results    Collection Time: 06/30/22 10:17 AM   Result Value Ref Range    Istat Activated Clotting Time 746 (H) 74 - 137 sec   POCT arterial blood gas device results    Collection Time: 06/30/22 10:18 AM   Result Value Ref Range    Ph 7.424 7.400 - 7.500    Pco2 38.9 (H) 26.0 - 37.0 mmHg    Po2 299 (H) 64 - 87 mmHg    Tco2 27 20 - 33 mmol/L    S02 100 (H) 93 - 99 %    Hco3 25.5 (H) 17.0 - 25.0 mmol/L    BE 1 -4 - 3 mmol/L    Body Temp 34.4 C degrees    Ph Temp Gisel 7.462 7.400 - 7.500    Pco2 Temp Co 34.7 26.0 - 37.0 mmHg    Po2 Temp Cor 287 (H) 64 - 87 mmHg     Specimen Arterial    POCT sodium device results    Collection Time: 06/30/22 10:18 AM   Result Value Ref Range    Istat Sodium 137 135 - 145 mmol/L   POCT potassium device results    Collection Time: 06/30/22 10:18 AM   Result Value Ref Range    Istat Potassium 5.4 3.6 - 5.5 mmol/L   POCT ionized CA device results    Collection Time: 06/30/22 10:18 AM   Result Value Ref Range    Istat Ionized Calcium 0.91 (L) 1.10 - 1.30 mmol/L   POCT hematocrit and hemoglobin device results    Collection Time: 06/30/22 10:18 AM   Result Value Ref Range    Istat Hematocrit 21 (L) 37 - 47 %    Istat Hemoglobin 7.1 (L) 12.0 - 16.0 g/dL   POCT glucose device results    Collection Time: 06/30/22 10:47 AM   Result Value Ref Range    POC Glucose, Blood 185 (H) 65 - 99 mg/dL   POCT activated clotting time device results    Collection Time: 06/30/22 10:49 AM   Result Value Ref Range    Istat Activated Clotting Time 503 (H) 74 - 137 sec   POCT arterial blood gas device results    Collection Time: 06/30/22 10:50 AM   Result Value Ref Range    Ph 7.362 (L) 7.400 - 7.500    Pco2 46.7 (H) 26.0 - 37.0 mmHg    Po2 321 (H) 64 - 87 mmHg    Tco2 28 20 - 33 mmol/L    S02 100 (H) 93 - 99 %    Hco3 26.5 (H) 17.0 - 25.0 mmol/L    BE 1 -4 - 3 mmol/L    Body Temp 34.9 C degrees    Ph Temp Gisel 7.393 (L) 7.400 - 7.500    Pco2 Temp Co 42.6 (H) 26.0 - 37.0 mmHg    Po2 Temp Cor 311 (H) 64 - 87 mmHg    Specimen Arterial    POCT glucose device results    Collection Time: 06/30/22 11:35 AM   Result Value Ref Range    POC Glucose, Blood 167 (H) 65 - 99 mg/dL   POCT activated clotting time device results    Collection Time: 06/30/22 11:36 AM   Result Value Ref Range    Istat Activated Clotting Time 144 (H) 74 - 137 sec   POCT arterial blood gas device results    Collection Time: 06/30/22 11:37 AM   Result Value Ref Range    Ph 7.377 (L) 7.400 - 7.500    Pco2 44.4 (H) 26.0 - 37.0 mmHg    Po2 337 (H) 64 - 87 mmHg    Tco2 27 20 - 33 mmol/L    S02 100 (H) 93 - 99 %     Hco3 26.1 (H) 17.0 - 25.0 mmol/L    BE 1 -4 - 3 mmol/L    Body Temp see below degrees    Specimen Arterial    POCT sodium device results    Collection Time: 06/30/22 11:37 AM   Result Value Ref Range    Istat Sodium 139 135 - 145 mmol/L   POCT potassium device results    Collection Time: 06/30/22 11:37 AM   Result Value Ref Range    Istat Potassium 4.4 3.6 - 5.5 mmol/L   POCT ionized CA device results    Collection Time: 06/30/22 11:37 AM   Result Value Ref Range    Istat Ionized Calcium 1.27 1.10 - 1.30 mmol/L   POCT hematocrit and hemoglobin device results    Collection Time: 06/30/22 11:37 AM   Result Value Ref Range    Istat Hematocrit 21 (L) 37 - 47 %    Istat Hemoglobin 7.1 (L) 12.0 - 16.0 g/dL   Platelet count    Collection Time: 06/30/22 12:21 PM   Result Value Ref Range    Platelet Count 158 (L) 164 - 446 K/uL   Magnesium    Collection Time: 06/30/22 12:21 PM   Result Value Ref Range    Magnesium 2.7 (H) 1.5 - 2.5 mg/dL   POCT arterial blood gas device results    Collection Time: 06/30/22 12:28 PM   Result Value Ref Range    Ph 7.407 7.400 - 7.500    Pco2 38.4 (H) 26.0 - 37.0 mmHg    Po2 165 (H) 64 - 87 mmHg    Tco2 25 20 - 33 mmol/L    S02 100 (H) 93 - 99 %    Hco3 24.2 17.0 - 25.0 mmol/L    BE 0 -4 - 3 mmol/L    Body Temp 36.3 C degrees    O2 Therapy 100 %    iPF Ratio 165     Ph Temp Gisel 7.418 7.400 - 7.500    Pco2 Temp Co 37.2 (H) 26.0 - 37.0 mmHg    Po2 Temp Cor 161 (H) 64 - 87 mmHg    Specimen Arterial     DelSys Vent     End Tidal Carbon Dioxide 28 mmhg    Peep End Expiratory Pressure 8 cmh20    Percent Minute Volume 160     Mode ASV    POCT sodium device results    Collection Time: 06/30/22 12:28 PM   Result Value Ref Range    Istat Sodium 139 135 - 145 mmol/L   EKG on arrival to CSU    Collection Time: 06/30/22 12:37 PM   Result Value Ref Range    Report       [facility]    Test Date:  2022-06-30  Pt Name:    ROSEMARIE ANTONIO                  Department: 161  MRN:        [id number]                       Room:       [facility]  Gender:     Female                       Technician: OLGA  :        [id number]                   Requested By:ROLANDA FORTE  Order #:    [id number]                    Reading MD:    Measurements  Intervals                                Axis  Rate:       67                           P:          0  OH:         196                          QRS:        58  QRSD:       86                           T:          10  QT:         448  QTc:        473    Interpretive Statements  SINUS RHYTHM  BORDERLINE LOW VOLTAGE IN FRONTAL LEADS  RSR' IN V1 OR V2, RIGHT VCD OR RVH  BORDERLINE R WAVE PROGRESSION, ANTERIOR LEADS  BORDERLINE T ABNORMALITIES, INFERIOR LEADS  Compared to ECG 2022 07:42:30  Right ventricular hypertrophy now present  RSR' in V1 or V2 now present  T-wave abnormality now present  Intraventricular conduction delay no l onger present           Imaging  DX-CHEST-PORTABLE (1 VIEW)   Final Result      1.  Interval open heart surgery.   2.  Supportive tubing as described above.   3.  No pneumothorax.   4.  LEFT lung base atelectasis.      EC-SACHA W/O CONT    (Results Pending)         Assessment/Plan  * S/P CABG x 3  Assessment & Plan  Procedure: CABG x3  *LIMA-LAD  *SVG-diagonal 1  *SVG-OM1  Date of surgery:   Surgeon: Dr Lama    Will monitor hemodynamics and titrate pressor and inotropic support.   PA catheter guided therapy:       - Goals SBP< 120 mmHg, MAP> 65, CI > 2.2    Will follow chest tube output and correct coagulopathy.     Will monitor for post cardiac surgery complication such as myocardial dysfunction, bleeding, tamponade, graft dysfunction, arrhythmia, respiratory failure, neurologic, renal and infectious complication.      Will also monitor and treat stress hyperglycemia.      CAD- (present on admission)  Assessment & Plan  STEMI last month @ [facility]  ALCON To RCA x2 (proximal and distal)  Now s/p CABG x3 (LAD/Diag/OM)    DAPT  Statin  Beta blocker (when stable  from post operative perspective)    On mechanically assisted ventilation (HCC)  Assessment & Plan  Intubated date: 6/30  Goal saturation > 90%  Monitor ventilator waveforms and titrate flow/peep and volumes according.   Lung protective ventilation strategy  CXR PRN: monitor lung volumes and tube/line placement  VAP bundle prevention, oral care, post pyloric feeding  Head of bed > 30 degree  GI prophylaxis: PPI  Daily awakening and SBT trials unless contraindicated  Assess / Treat pain  Assess / Treat delirium  Sedation Goal: RASS 0 to +1  Monitor for liberation / early mobility  Respiratory treatments: prn  ABCDEF Bundle       VTE:  Per cardiac surgery  Ulcer: PPI  Lines: Central Line  Ongoing indication addressed, Arterial Line  Ongoing indication addressed and Valles Catheter  Ongoing indication addressed      I have performed a physical exam and reviewed and updated ROS and Plan today (6/30/2022). In review of yesterday's note (6/29/2022), there are no changes except as documented above.     Discussed patient condition and risk of morbidity and/or mortality with Family, RN, RT, Pharmacy, , Charge nurse / hot rounds, Patient and [facility]     The patient remains critically ill.  Critical care time = 44 minutes in directly providing and coordinating critical care and extensive data review.  No time overlap and excludes procedures.

## 2022-06-30 NOTE — ANESTHESIA PROCEDURE NOTES
Airway    Date/Time: 6/30/2022 8:02 AM  Performed by: Betty Miller M.D.  Authorized by: Betty Miller M.D.     Location:  OR  Urgency:  Elective  Indications for Airway Management:  Anesthesia      Spontaneous Ventilation: absent    Sedation Level:  Deep  Preoxygenated: Yes    Patient Position:  Sniffing  Mask Difficulty Assessment:  1 - vent by mask  Final Airway Type:  Endotracheal airway  Final Endotracheal Airway:  ETT  Cuffed: Yes    Technique Used for Successful ETT Placement:  Direct laryngoscopy    Insertion Site:  Oral  Blade Type:  Malik  Laryngoscope Blade/Videolaryngoscope Blade Size:  3  ETT Size (mm):  8.0  Measured from:  Teeth  ETT to Teeth (cm):  22  Placement Verified by: auscultation and capnometry    Cormack-Lehane Classification:  Grade IIa - partial view of glottis  Number of Attempts at Approach:  1

## 2022-06-30 NOTE — ASSESSMENT & PLAN NOTE
STEMI last month @ [facility]  ALCON To RCA x2 (proximal and distal)  Now s/p CABG x3 (LAD/Diag/OM)    DAPT  Statin  Beta blocker (when stable from post operative perspective)

## 2022-06-30 NOTE — H&P
REFERRING PHYSICIAN: Erick Victoria MD.     CONSULTING PHYSICIAN: Irvin Lama DO.    CHIEF COMPLAINT: chest pain    HISTORY OF PRESENT ILLNESS: The patient is a 67 y.o. female with a history of HTN, hyperlipidemia who presented to [facility] via EMS on 2022 with chest pain.  EKG showed STEMI.  She was urgently taken to the cath lab where she was found to have 100% occlusion of proximal RCA and 80% distal occlusion of the RCA which were treated with drug eluting stents.  She has significant multivessel residual coronary artery disease including 90% ostial LAD, 80-90% LAD stenosis at the diagonal branch, 90% left circumflex OM branch narrowing and 80% AV trunk narrowing.  She was discharged with no complications and returns today for follow-up for a staged CABG.    PAST MEDICAL HISTORY:   Past Medical History:   Diagnosis Date   • CAD (coronary artery disease)    • Hyperlipidemia    • Hypertension    • PONV (postoperative nausea and vomiting)    • Psychiatric disorder     Depression, PTSD, anxiety       PAST SURGICAL HISTORY:   Past Surgical History:   Procedure Laterality Date   • CHOLECYSTECTOMY      Open   • MAMMOPLASTY AUGMENTATION Bilateral        FAMILY HISTORY:   History reviewed. No pertinent family history.     SOCIAL HISTORY:   Social History     Socioeconomic History   • Marital status:      Spouse name: Not on file   • Number of children: Not on file   • Years of education: Not on file   • Highest education level: Not on file   Occupational History   • Not on file   Tobacco Use   • Smoking status: Former Smoker     Packs/day: 0.50     Years: 15.00     Pack years: 7.50     Types: Cigarettes     Quit date: 2022     Years since quittin.0   • Smokeless tobacco: Never Used   Vaping Use   • Vaping Use: Never used   Substance and Sexual Activity   • Alcohol use: No     Comment: social   • Drug use: Yes     Comment: edibles, marijuana   • Sexual activity: Not on file   Other  Topics Concern   • Not on file   Social History Narrative   • Not on file     Social Determinants of Health     Financial Resource Strain: Not on file   Food Insecurity: Not on file   Transportation Needs: Not on file   Physical Activity: Not on file   Stress: Not on file   Social Connections: Not on file   Intimate Partner Violence: Not on file   Housing Stability: Not on file     ALLERGIES:   Allergies   Allergen Reactions   • Oxycodone Itching   • Dilaudid [Hydromorphone] Itching   • Morphine Vomiting and Nausea      CURRENT MEDICATIONS:     Current Facility-Administered Medications:   •  lidocaine (XYLOCAINE) 1 % injection 0.5 mL, 0.5 mL, Intradermal, Once PRN, Irvin Lama, D.O., 0.5 mL at 06/30/22 0702  •  ceFAZolin in dextrose (Ancef) IVPB premix 2 g, 2 g, Intravenous, Pre-Op Once, Irvin Lama, D.O.  •  insulin regular (HumuLIN R) 100 Units in  mL Infusion, 1-6 Units/hr, Intravenous, Continuous, Irvin Lama, D.O.  •  EPINEPHrine (Adrenalin) infusion 4 mg/250 mL (premix), 0-0.2 mcg/kg/min, Intravenous, Continuous, Irvin Yostt, D.O.  •  norepinephrine (Levophed) 8 mg in 250 mL NS infusion (premix), 0-30 mcg/min, Intravenous, Continuous, Irvin Yostt, D.O.  •  dexmedetomidine (Precedex) 400 mcg/100mL D5W premix infusion, 0-1.5 mcg/kg/hr, Intravenous, Continuous, Irvin Yostt, D.O.  •  aminocaproic acid (AMICAR) 7.7 g in  mL IV Bolus, 100 mg/kg, Intravenous, Once, Irvin Yostt, D.O.  •  aminocaproic acid (AMICAR) 5 g in  mL Infusion, 2 g/hr, Intravenous, Once, Irvin Yostt, D.O.  •  methadone 10 mg/mL (Dolophine) injection 20 mg, 20 mg, Intravenous, Once, Irvin Yostt, D.O.     LABS REVIEWED:  Lab Results   Component Value Date/Time    SODIUM 135 06/28/2022 12:16 PM    POTASSIUM 3.9 06/28/2022 12:16 PM    CHLORIDE 98 06/28/2022 12:16 PM    CO2 25 06/28/2022 12:16 PM    GLUCOSE 110 (H) 06/28/2022 12:16 PM    BUN 15  06/28/2022 12:16 PM    CREATININE 0.57 06/28/2022 12:16 PM      Lab Results   Component Value Date/Time    PROTHROMBTM 13.6 06/28/2022 12:16 PM    INR 1.08 06/28/2022 12:16 PM      Lab Results   Component Value Date/Time    WBC 8.7 06/28/2022 12:16 PM    RBC 4.31 06/28/2022 12:16 PM    HEMOGLOBIN 13.9 06/28/2022 12:16 PM    HEMATOCRIT 38.8 06/28/2022 12:16 PM    MCV 90.0 06/28/2022 12:16 PM    MCH 32.3 06/28/2022 12:16 PM    MCHC 35.8 (H) 06/28/2022 12:16 PM    MPV 9.3 06/28/2022 12:16 PM    NEUTSPOLYS 68.60 06/28/2022 12:16 PM    LYMPHOCYTES 20.50 (L) 06/28/2022 12:16 PM    MONOCYTES 9.60 06/28/2022 12:16 PM    EOSINOPHILS 0.30 06/28/2022 12:16 PM    BASOPHILS 0.80 06/28/2022 12:16 PM        IMAGING REVIEWED AND INTERPRETED:    ECHOCARDIOGRAM:   [facility] 5/13/2022  1. The left ventricle is normal size and function. LVEF estimated at 55% by Jett's biplane method. Normal wall thickness. No segmental wall motion abnormalities seen. Grade 1 (impaired relaxation) diastolic dysfunction. No VSD or apical thrombus seen.  2. The right ventricle is normal in size and function. RVSP estimated at 27 mmHg with RAP of 3 mmHg.    ANGIOGRAM:   ANGIOGRAM: 5/11/2022  Right coronary artery: Dominant vessel diffuse disease is present the vessel is totally occluded near its origin with CAMRON 0 flow    Left main trunk: Normal.    Left anterior descending colon 90% ostial narrowing right off the left main. Additional bifurcation 80 to 90% narrowing at the diagonal branch with an unusual band in the lesion additional 40% narrowing in the distal portion    Circumflex: Nondominant supplies a large obtuse marginal branch which has 90% narrowing. The AV trunk is narrowed 80% at its bifurcation from the obtuse marginal. This is prior to the takeoff of the posterior lateral branch    Left ventriculogram: Normal volume in diastole normal volume and systole. There is mild hypokinesis of the very proximal inferior wall ejection  fraction estimate is 50% there is no left ventricular outflow gradient. Left ventricular end-diastolic pressure is 20 mmHg    PCI of the right coronary artery: The proximal stenosis was reduced from 100% to 0% CAMRON flow improved from 0 to grade 3. The lesion in the distal segment is open from 80% to 0% residual. The posterior descending appears to be small and/or underfilled and appears to have a ostial lesion of about 50 to 70%.  Conclusion:  · Prox RCA lesion is 100% stenosed.    Acute inferior STEMI with total occlusion of the proximal right coronary artery with CAMRON 0 flow.  Successful stenting of the proximal and distal right coronary artery  Minimal left ventricular dysfunction  Triple-vessel complex coronary artery disease involving all major coronary branches.     Recommendations: Aggressive medical treatment consideration for coronary bypass surgery in the recovery phase of her MI. Patient's anatomy is not suitable for PCI.    CAROTIDS:  [facility] 6/28/2022  1. No evidence of hemodynamically significant carotid stenosis per NASCET criteria.      REVIEW OF SYSTEMS:   Review of Systems   Cardiovascular: Positive for chest pain.   All other systems reviewed and are negative.    PHYSICAL EXAMINATION:   Physical Exam  Vitals and nursing note reviewed.   Constitutional:       Appearance: Normal appearance.   HENT:      Head: Normocephalic and atraumatic.      Mouth/Throat:      Mouth: Mucous membranes are dry.   Eyes:      Extraocular Movements: Extraocular movements intact.      Pupils: Pupils are equal, round, and reactive to light.   Cardiovascular:      Rate and Rhythm: Normal rate and regular rhythm.   Pulmonary:      Effort: Pulmonary effort is normal. No respiratory distress.   Abdominal:      General: Abdomen is flat.      Palpations: Abdomen is soft.   Musculoskeletal:         General: Normal range of motion.      Cervical back: Normal range of motion and neck supple.      Right lower leg: No edema.      Left  "lower leg: No edema.   Skin:     General: Skin is warm and dry.   Neurological:      General: No focal deficit present.      Mental Status: She is alert and oriented to person, place, and time.   Psychiatric:         Mood and Affect: Mood normal.         Behavior: Behavior normal.         Thought Content: Thought content normal.         Judgment: Judgment normal.       BP (!) 148/70   Pulse 77   Temp 36.2 °C (97.2 °F) (Temporal)   Resp 16   Ht 1.651 m (5' 5\")   Wt 77.8 kg (171 lb 8.3 oz)   SpO2 95%   BMI 28.54 kg/m²        IMPRESSION:  STEMI status post PCI to the RCA, multivessel coronary artery disease,     PLAN:  I recommend CABG 2-3 today.    The procedure, its risks, benefits, potential complications and alternative treatments were discussed with the patient in detail including the risks should she decide not to undergo my recommended treatment. All of her questions were answered to her satisfaction and she is willing to proceed with the operation. The risks include death, stroke,  infection: to include a rare bacterial infection related to the use of the heart/lung machine, robby-operative myocardial infarction, dysrhythmias, diaphragmatic paralysis, chest wall paresthesia, tracheostomy, kidney or other organ failure, possible return to the operating room for bleeding, bleeding requiring transfusion with its attendant risks including AIDS or hepatitis, dehiscence of surgical incisions, respiratory complications including the need for prolonged ventilator support, Protamine or other drug reaction, peripheral neuropathy, loss of limb, and miscount of surgical items.       Sincerely,       Irvin Lama DO.      SYED,  Irvin Lama DO performed a substantial portion of the EM visit face-to-face with the same patient on the same date of service with RUTH Thapa. I was personally involved in reviewing and interpreting the films and conducted elements of the history and physical exam. I " performed all of the medical decision making for the patient.

## 2022-07-01 ENCOUNTER — APPOINTMENT (OUTPATIENT)
Dept: RADIOLOGY | Facility: MEDICAL CENTER | Age: 67
DRG: 236 | End: 2022-07-01
Attending: CLINICAL NURSE SPECIALIST
Payer: MEDICARE

## 2022-07-01 LAB
ANION GAP SERPL CALC-SCNC: 12 MMOL/L (ref 7–16)
BUN SERPL-MCNC: 13 MG/DL (ref 8–22)
CA-I BLD ISE-SCNC: 0.98 MMOL/L (ref 1.1–1.3)
CA-I BLD ISE-SCNC: 1.2 MMOL/L (ref 1.1–1.3)
CALCIUM SERPL-MCNC: 7.4 MG/DL (ref 8.5–10.5)
CHLORIDE SERPL-SCNC: 104 MMOL/L (ref 96–112)
CO2 SERPL-SCNC: 20 MMOL/L (ref 20–33)
CREAT SERPL-MCNC: 0.33 MG/DL (ref 0.5–1.4)
ERYTHROCYTE [DISTWIDTH] IN BLOOD BY AUTOMATED COUNT: 46.9 FL (ref 35.9–50)
GFR SERPLBLD CREATININE-BSD FMLA CKD-EPI: 114 ML/MIN/1.73 M 2
GLUCOSE BLD STRIP.AUTO-MCNC: 103 MG/DL (ref 65–99)
GLUCOSE BLD STRIP.AUTO-MCNC: 121 MG/DL (ref 65–99)
GLUCOSE BLD STRIP.AUTO-MCNC: 128 MG/DL (ref 65–99)
GLUCOSE BLD STRIP.AUTO-MCNC: 131 MG/DL (ref 65–99)
GLUCOSE BLD STRIP.AUTO-MCNC: 131 MG/DL (ref 65–99)
GLUCOSE BLD STRIP.AUTO-MCNC: 137 MG/DL (ref 65–99)
GLUCOSE BLD STRIP.AUTO-MCNC: 139 MG/DL (ref 65–99)
GLUCOSE BLD STRIP.AUTO-MCNC: 156 MG/DL (ref 65–99)
GLUCOSE BLD STRIP.AUTO-MCNC: 158 MG/DL (ref 65–99)
GLUCOSE BLD STRIP.AUTO-MCNC: 158 MG/DL (ref 65–99)
GLUCOSE BLD STRIP.AUTO-MCNC: 162 MG/DL (ref 65–99)
GLUCOSE BLD STRIP.AUTO-MCNC: 168 MG/DL (ref 65–99)
GLUCOSE BLD STRIP.AUTO-MCNC: 169 MG/DL (ref 65–99)
GLUCOSE BLD STRIP.AUTO-MCNC: 180 MG/DL (ref 65–99)
GLUCOSE BLD STRIP.AUTO-MCNC: 187 MG/DL (ref 65–99)
GLUCOSE BLD STRIP.AUTO-MCNC: 188 MG/DL (ref 65–99)
GLUCOSE BLD STRIP.AUTO-MCNC: 189 MG/DL (ref 65–99)
GLUCOSE BLD STRIP.AUTO-MCNC: 203 MG/DL (ref 65–99)
GLUCOSE SERPL-MCNC: 156 MG/DL (ref 65–99)
HCT VFR BLD AUTO: 32.3 % (ref 37–47)
HCT VFR BLD CALC: 22 % (ref 37–47)
HCT VFR BLD CALC: 28 % (ref 37–47)
HGB BLD-MCNC: 11.4 G/DL (ref 12–16)
HGB BLD-MCNC: 7.5 G/DL (ref 12–16)
HGB BLD-MCNC: 9.5 G/DL (ref 12–16)
MCH RBC QN AUTO: 31 PG (ref 27–33)
MCHC RBC AUTO-ENTMCNC: 35.3 G/DL (ref 33.6–35)
MCV RBC AUTO: 87.8 FL (ref 81.4–97.8)
PLATELET # BLD AUTO: 173 K/UL (ref 164–446)
PMV BLD AUTO: 9.7 FL (ref 9–12.9)
POTASSIUM BLD-SCNC: 4.3 MMOL/L (ref 3.6–5.5)
POTASSIUM BLD-SCNC: 5 MMOL/L (ref 3.6–5.5)
POTASSIUM SERPL-SCNC: 4.4 MMOL/L (ref 3.6–5.5)
RBC # BLD AUTO: 3.68 M/UL (ref 4.2–5.4)
SODIUM BLD-SCNC: 136 MMOL/L (ref 135–145)
SODIUM SERPL-SCNC: 136 MMOL/L (ref 135–145)
WBC # BLD AUTO: 12.2 K/UL (ref 4.8–10.8)

## 2022-07-01 PROCEDURE — 700102 HCHG RX REV CODE 250 W/ 637 OVERRIDE(OP): Performed by: NURSE PRACTITIONER

## 2022-07-01 PROCEDURE — 700111 HCHG RX REV CODE 636 W/ 250 OVERRIDE (IP): Mod: JG | Performed by: NURSE PRACTITIONER

## 2022-07-01 PROCEDURE — A9270 NON-COVERED ITEM OR SERVICE: HCPCS | Performed by: NURSE PRACTITIONER

## 2022-07-01 PROCEDURE — 82962 GLUCOSE BLOOD TEST: CPT | Mod: 91

## 2022-07-01 PROCEDURE — 700111 HCHG RX REV CODE 636 W/ 250 OVERRIDE (IP): Performed by: CLINICAL NURSE SPECIALIST

## 2022-07-01 PROCEDURE — 93005 ELECTROCARDIOGRAM TRACING: CPT | Performed by: CLINICAL NURSE SPECIALIST

## 2022-07-01 PROCEDURE — 700102 HCHG RX REV CODE 250 W/ 637 OVERRIDE(OP): Performed by: THORACIC SURGERY (CARDIOTHORACIC VASCULAR SURGERY)

## 2022-07-01 PROCEDURE — 99233 SBSQ HOSP IP/OBS HIGH 50: CPT | Performed by: STUDENT IN AN ORGANIZED HEALTH CARE EDUCATION/TRAINING PROGRAM

## 2022-07-01 PROCEDURE — P9045 ALBUMIN (HUMAN), 5%, 250 ML: HCPCS | Mod: JG | Performed by: NURSE PRACTITIONER

## 2022-07-01 PROCEDURE — 71045 X-RAY EXAM CHEST 1 VIEW: CPT

## 2022-07-01 PROCEDURE — 770020 HCHG ROOM/CARE - TELE (206)

## 2022-07-01 PROCEDURE — 700102 HCHG RX REV CODE 250 W/ 637 OVERRIDE(OP): Performed by: CLINICAL NURSE SPECIALIST

## 2022-07-01 PROCEDURE — 94669 MECHANICAL CHEST WALL OSCILL: CPT

## 2022-07-01 PROCEDURE — 80048 BASIC METABOLIC PNL TOTAL CA: CPT

## 2022-07-01 PROCEDURE — 99024 POSTOP FOLLOW-UP VISIT: CPT | Performed by: CLINICAL NURSE SPECIALIST

## 2022-07-01 PROCEDURE — 85027 COMPLETE CBC AUTOMATED: CPT

## 2022-07-01 PROCEDURE — A9270 NON-COVERED ITEM OR SERVICE: HCPCS | Performed by: CLINICAL NURSE SPECIALIST

## 2022-07-01 RX ORDER — DEXTROSE MONOHYDRATE 25 G/50ML
25 INJECTION, SOLUTION INTRAVENOUS
Status: DISCONTINUED | OUTPATIENT
Start: 2022-07-01 | End: 2022-07-01

## 2022-07-01 RX ORDER — ALBUMIN, HUMAN INJ 5% 5 %
25 SOLUTION INTRAVENOUS ONCE
Status: COMPLETED | OUTPATIENT
Start: 2022-07-01 | End: 2022-07-01

## 2022-07-01 RX ORDER — DIPHENHYDRAMINE HCL 25 MG
25 TABLET ORAL EVERY 6 HOURS PRN
Status: DISCONTINUED | OUTPATIENT
Start: 2022-07-01 | End: 2022-07-06 | Stop reason: HOSPADM

## 2022-07-01 RX ORDER — CITALOPRAM 20 MG/1
20 TABLET ORAL EVERY EVENING
Status: DISCONTINUED | OUTPATIENT
Start: 2022-07-02 | End: 2022-07-06 | Stop reason: HOSPADM

## 2022-07-01 RX ORDER — DEXTROSE MONOHYDRATE 25 G/50ML
25 INJECTION, SOLUTION INTRAVENOUS
Status: DISCONTINUED | OUTPATIENT
Start: 2022-07-01 | End: 2022-07-03

## 2022-07-01 RX ORDER — CITALOPRAM 20 MG/1
10 TABLET ORAL EVERY EVENING
Status: DISCONTINUED | OUTPATIENT
Start: 2022-07-01 | End: 2022-07-01

## 2022-07-01 RX ORDER — CITALOPRAM 20 MG/1
10 TABLET ORAL ONCE
Status: COMPLETED | OUTPATIENT
Start: 2022-07-01 | End: 2022-07-01

## 2022-07-01 RX ORDER — CHOLECALCIFEROL (VITAMIN D3) 125 MCG
10 CAPSULE ORAL NIGHTLY
Status: DISCONTINUED | OUTPATIENT
Start: 2022-07-01 | End: 2022-07-01

## 2022-07-01 RX ORDER — FUROSEMIDE 10 MG/ML
20 INJECTION INTRAMUSCULAR; INTRAVENOUS ONCE
Status: COMPLETED | OUTPATIENT
Start: 2022-07-01 | End: 2022-07-01

## 2022-07-01 RX ADMIN — ACETAMINOPHEN 1000 MG: 500 TABLET, FILM COATED ORAL at 05:43

## 2022-07-01 RX ADMIN — INSULIN HUMAN 2 UNITS: 100 INJECTION, SOLUTION PARENTERAL at 21:46

## 2022-07-01 RX ADMIN — DIPHENHYDRAMINE HYDROCHLORIDE 25 MG: 25 TABLET ORAL at 21:45

## 2022-07-01 RX ADMIN — INSULIN HUMAN 2 UNITS: 100 INJECTION, SOLUTION PARENTERAL at 11:55

## 2022-07-01 RX ADMIN — Medication 10 MG: at 20:30

## 2022-07-01 RX ADMIN — ACETAMINOPHEN 1000 MG: 500 TABLET, FILM COATED ORAL at 17:44

## 2022-07-01 RX ADMIN — MAGNESIUM SULFATE HEPTAHYDRATE 1 G: 1 INJECTION, SOLUTION INTRAVENOUS at 06:11

## 2022-07-01 RX ADMIN — INSULIN HUMAN 2 UNITS: 100 INJECTION, SOLUTION PARENTERAL at 09:00

## 2022-07-01 RX ADMIN — ALBUMIN (HUMAN) 25 G: 2.5 SOLUTION INTRAVENOUS at 09:01

## 2022-07-01 RX ADMIN — OXYCODONE HYDROCHLORIDE 10 MG: 10 TABLET ORAL at 08:16

## 2022-07-01 RX ADMIN — ATORVASTATIN CALCIUM 40 MG: 40 TABLET, FILM COATED ORAL at 21:45

## 2022-07-01 RX ADMIN — CITALOPRAM HYDROBROMIDE 10 MG: 20 TABLET ORAL at 21:45

## 2022-07-01 RX ADMIN — FUROSEMIDE 20 MG: 10 INJECTION, SOLUTION INTRAMUSCULAR; INTRAVENOUS at 13:01

## 2022-07-01 RX ADMIN — SENNOSIDES AND DOCUSATE SODIUM 2 TABLET: 50; 8.6 TABLET ORAL at 17:44

## 2022-07-01 RX ADMIN — ACETAMINOPHEN 1000 MG: 500 TABLET, FILM COATED ORAL at 23:29

## 2022-07-01 RX ADMIN — OXYCODONE HYDROCHLORIDE 10 MG: 10 TABLET ORAL at 13:00

## 2022-07-01 RX ADMIN — ASPIRIN 81 MG: 81 TABLET, COATED ORAL at 05:42

## 2022-07-01 RX ADMIN — METOPROLOL TARTRATE 12.5 MG: 25 TABLET, FILM COATED ORAL at 17:44

## 2022-07-01 RX ADMIN — ACETAMINOPHEN 1000 MG: 500 TABLET, FILM COATED ORAL at 11:53

## 2022-07-01 RX ADMIN — CLOPIDOGREL BISULFATE 75 MG: 75 TABLET ORAL at 05:42

## 2022-07-01 RX ADMIN — POLYETHYLENE GLYCOL 3350 1 PACKET: 17 POWDER, FOR SOLUTION ORAL at 05:43

## 2022-07-01 RX ADMIN — OXYCODONE HYDROCHLORIDE 10 MG: 10 TABLET ORAL at 03:27

## 2022-07-01 RX ADMIN — DIPHENHYDRAMINE HYDROCHLORIDE 25 MG: 25 TABLET ORAL at 13:00

## 2022-07-01 RX ADMIN — TRAMADOL HYDROCHLORIDE 50 MG: 50 TABLET, COATED ORAL at 18:29

## 2022-07-01 RX ADMIN — OMEPRAZOLE 20 MG: 20 CAPSULE, DELAYED RELEASE ORAL at 05:42

## 2022-07-01 RX ADMIN — TRAMADOL HYDROCHLORIDE 50 MG: 50 TABLET, COATED ORAL at 23:28

## 2022-07-01 RX ADMIN — CITALOPRAM HYDROBROMIDE 10 MG: 20 TABLET ORAL at 17:43

## 2022-07-01 RX ADMIN — SENNOSIDES AND DOCUSATE SODIUM 2 TABLET: 50; 8.6 TABLET ORAL at 05:42

## 2022-07-01 ASSESSMENT — ENCOUNTER SYMPTOMS
SHORTNESS OF BREATH: 0
VOMITING: 0
SPUTUM PRODUCTION: 0
FOCAL WEAKNESS: 0
MUSCULOSKELETAL NEGATIVE: 1
SORE THROAT: 0
PALPITATIONS: 0
ABDOMINAL PAIN: 0
FEVER: 0
EYES NEGATIVE: 1
HEADACHES: 0
CHILLS: 0
NAUSEA: 0

## 2022-07-01 ASSESSMENT — PAIN DESCRIPTION - PAIN TYPE
TYPE: SURGICAL PAIN
TYPE: SURGICAL PAIN

## 2022-07-01 ASSESSMENT — FIBROSIS 4 INDEX: FIB4 SCORE: 1.08

## 2022-07-01 NOTE — PROGRESS NOTES
Monitor Summary    .23/.08/.49  SR  60s-70s with rare PVCs    Epicardial Pacing Wires   VVI  50/10/2  No pacing required

## 2022-07-01 NOTE — CARE PLAN
Problem: Day of surgery post CABG/Heart valve replacement  Goal: Stabilization in immediate post op period  Outcome: Progressing  Intervention: VS q 15 min x 4 hours, then q 1 hour. Include temperature immediately upon arrival. Check CO/CI q 2-4 hours and PRN  Note: CO/CI N/A  Art line for continuous BP monitoring   Intervention: If radial artery used, elevate arm, no BP checks or needle sticks from affected arm, monitor ulnar pulse and capillary refill  Note: N/A  Intervention: First post op hour labs and EKG per order  Note: Completed  Intervention: Serum K q 6 hours x 24 hours.  ABG and CBC prn.  Note: Completed  Intervention: Initiate post cardiac insulin infusion protocol orders for FSBS greater than 140 and check frequency per protocol  Note: Pt started insulin gtt in OR   Intervention: FSBS frequency as per Cardiac Surgery Insulin Drip Protocol  Note: Q 1-2 hour per protocol  Intervention: For patients on Beta Blockers: verify dose given prior to surgery or within 6 hours after arrival to the unit  Note: Given in pre-op  Intervention: Chest tube to 20 cm suction, record CT drainage with VS, and check for air leak  Note: No air leak noted   Intervention: For CT drainage >300 mL in first hour post op and/or 150 mL in subsequent hours: Stat platelets, PT, INR, TEG, iSTAT, and H&H per order  Note: Total CT output: 130  Intervention: Titrate and wean off vasoactive drips per patient's condition and per MD order while maintaining SBP  mmHg per MD order  Note: Pt on low dose levo infusion   Intervention: VAP protocol in place  Note: HOB 30 degrees while intubated   Intervention: Wean from Vent per protocol (see protocol), extubation goal within 6 hours post op  Note: Pt extubated at 1625  Intervention: IS q 1 hour while awake post extubation  Note: IS 1750  Intervention: Bedrest until extubated and groin lines out  Note: Bed rest ended. Pt extubated and no groin lines  Intervention: Dangle within 4 hours  post extubation  Note: Pt to sit edge of bed 2030  Intervention: Maintain all original surgical dressings per provider orders and specifications  Note: Island dressing over midline sternal incision. Ace wrap over R EVH  Intervention: Discontinue [place] santos and arterial line 12-18 hours post op if hemodynamically stable and off vasoactive drips  Note: N/A     The patient is Watcher - Medium risk of patient condition declining or worsening         Progress made toward(s) clinical / shift goals:  Pt extubated, neuro intact    Patient is not progressing towards the following goals:low dose levo for BP support

## 2022-07-01 NOTE — PROGRESS NOTES
Monitor Summary  SR with first degree block 60s-70s   Media Information        Deterioration Index Score > 60     critical  Deterioration Index Score > 60  Deterioration Index                 File Link    Monitoring Strips GE - Scan on 6/30/2022 12:00 AM        Key Information    Document ID File Type Document Type Description   [id number] Image Monitoring Strips GE        Import Information    Attached At Date Time User Dept   Encounter Level 6/30/2022 12:32 PM Physician Outpatient        Encounter    Hospital Encounter on 6/30/22             Page 1 of 2            Document Information    Cardiology Imaging:  Monitoring Strips GE      06/30/2022 00:00   Attached To:   Hospital Encounter on 6/30/22     Source Information    Physician Outpatient

## 2022-07-01 NOTE — DIETARY
Nutrition Services: Cardiac Rehab Diet Education Consult     Day 1 of admit.  Rony Chilel is a 67 y.o. female with admitting DX of Coronary artery disease involving native coronary artery without angina pectoris [I25.10].    S/p CABG x3 6/30.   PMH includes CAD and HTN.  HA1c WNL; no lipid panel.  BMI 28.54 (pre-op).    RD attempted diet education visit x2, but RN was in with pt. Left pertinent dietary handouts outside of pt's room. Please provide to pt and encourage her to take them home as a reference.     Re-consult RD or contact via Voalte if pt would like discussion of dietary recommendations prior to D/c.

## 2022-07-01 NOTE — CARE PLAN
The patient is Watcher - Medium risk of patient condition declining or worsening         Progress made toward(s) clinical / shift goals:        Problem: Day of surgery post CABG/Heart valve replacement  Goal: Stabilization in immediate post op period  Intervention: Serum K q 6 hours x 24 hours.  ABG and CBC prn.  Note: Potassium monitored and replaced per protocol  Intervention: FSBS frequency as per Cardiac Surgery Insulin Drip Protocol  Note: FSBS checked per protocol  Intervention: Chest tube to 20 cm suction, record CT drainage with VS, and check for air leak  Note: CT to suction, drainage recorded, no air leak noted  Intervention: Titrate and wean off vasoactive drips per patient's condition and per MD order while maintaining SBP  mmHg per MD order  Note: Titrating norepinephrine per patient condition   Intervention: IS q 1 hour while awake post extubation  Note: Patient doing IS q1 hour while awake  Intervention: Dangle within 4 hours post extubation  Note: Completed  Intervention: Up in chair 4 hours, day of extubation  Note: To be up in chair in AM  Intervention: Maintain all original surgical dressings per provider orders and specifications  Note: Surgical dressings maintained per provider order   Intervention: Clear liquids post extubation, order carbohydrate free (post cardiac surgery) diet, advance as tolerated  Note: Clear liquid diet ordered

## 2022-07-01 NOTE — HOSPITAL COURSE
6/30-CABG, extubated in the afternoon  7/1-doing well, weaning pressors  7/2 - Off pressors, [facility] will sign off

## 2022-07-01 NOTE — CARE PLAN
Problem: Post Op Day 1 CABG/Heart Valve Replacement  Goal: Optimal care of the post op CABG/heart valve replacement Post Op Day 1  Outcome: Progressing  Intervention: EKG and CXR completed  Note: Completed in am  Intervention: All valve patients: PT/INR daily  Note: N/A  Intervention: Antibiotics are discontinued within 24 hours of anesthesia end time unless indication documented for continuation beyond 24 hours  Note: Completed  Intervention: Daily weights in the morning  Note: Completed in am. Weight up. Lasix given.   Intervention: Up in chair for all meals  Note: Pt up to chair all shift   Intervention: Ambulate in am if stable. First ambulation 25 feet. Repeat x 3 as tolerated  Note: Pt ambulating about 100 ft with 1 assist and FWW.   Intervention: Assess surgical dressing and check provider orders for potential removal  Note: Incisions CDI  Intervention: Ensure referal to intensive cardiac rehab is ordered, and smoking cessation education if appropriate  Note: Completed  Intervention: [facility] trained RN to remove chest tubes if ordered by provider  Note: Completed. Shift output 30cc  Intervention: IS q 1 hour while awake and record best IS volume  Note: IS 1250  Intervention: Knee high R[place]MOND hose, on during the day, off at night  Note: Completed  Intervention: Saline lock IV  Note: Completed  Intervention: Transfer to tele status, begin VS q 4 hours  Note: Completed.   Intervention: After 24th hour post-anesthesia end time, transition patient to Cardiac Surgery SQ Insulin Protocol  Note: Completed  Intervention: If patient is CABG or on home beta-blocker, start/resume beta-blocker on POD 1 or POD 2 or document contraindication  Note: Metoprolol ordered     The patient is Stable - Low risk of patient condition declining or worsening         Progress made toward(s) clinical / shift goals:  Transferred to tele status, ambulating well     Patient is not progressing towards the following goals: 0.5 L O2

## 2022-07-01 NOTE — PROGRESS NOTES
Cardiovascular Surgery Progress Note    Name: Rony Chilel  MRN: [id number]  : 1955  Admit Date: 2022  5:53 AM  1 Day Post-Op     Procedure:  Procedure(s) and Anesthesia Type:     * CABG, WITH ENDOSCOPIC VEIN PROCUREMENT-X3 - General     * ECHOCARDIOGRAM, TRANSESOPHAGEAL, INTRAOPERATIVE - General    Vitals:  Vitals:    22 0400 22 0500 22 0600 22 0741   BP:  (!) 90/53 (!) 81/46    Pulse: 63 70 69 68   Resp: 19 (!) 33 (!) 22 (!) 10   Temp:       TempSrc:       SpO2: 96% 96% 94% 93%   Weight:  83.3 kg (183 lb 10.3 oz)     Height:          Temp (24hrs), Av.7 °C (98 °F), Min:36.1 °C (97 °F), Max:37.1 °C (98.8 °F)      Respiratory:  Vent Mode: Spont, PEEP/CPAP: 8, PIP: 17, MAP: 11 Respiration: (!) 10, Pulse Oximetry: 93 %       Fluids:    Intake/Output Summary (Last 24 hours) at 2022 0921  Last data filed at 2022 0600  Gross per 24 hour   Intake 5576.45 ml   Output 2576 ml   Net 3000.45 ml     Admit weight: Weight: 77 kg (169 lb 12.1 oz)  Current weight: Weight: 83.3 kg (183 lb 10.3 oz) (22 0500)    Labs:  Recent Labs     22  1216 22  1221 22  0400   WBC 8.7  --  12.2*   RBC 4.31  --  3.68*   HEMOGLOBIN 13.9  --  11.4*   HEMATOCRIT 38.8  --  32.3*   MCV 90.0  --  87.8   MCH 32.3  --  31.0   MCHC 35.8*  --  35.3*   RDW 42.5  --  46.9   PLATELETCT 331 158* 173   MPV 9.3  --  9.7     Recent Labs     22  1216 22  1808 22  2300 22  0400   SODIUM 135  --   --  136   POTASSIUM 3.9 3.9 4.1 4.4   CHLORIDE 98  --   --  104   CO2 25  --   --  20   GLUCOSE 110*  --   --  156*   BUN 15  --   --  13   CREATININE 0.57  --   --  0.33*   CALCIUM 9.5  --   --  7.4*     Recent Labs     22  1216 22  1221   APTT 27.6 77.0*   INR 1.08 1.57*         Medications:  Scheduled Medications   Medication Dose Frequency   • citalopram  10 mg Q EVENING   • insulin regular  2-9 Units 4X/DAY ACHS   • magnesium sulfate  1 g DAILY   • K+ Scale:  Goal of 4.5  1 Each Q6HRS   • Pharmacy Consult Request  1 Each PHARMACY TO DOSE   • acetaminophen  1,000 mg Q6HRS   • senna-docusate  2 Tablet BID    And   • polyethylene glycol/lytes  1 Packet DAILY    And   • [START ON 7/2/2022] magnesium hydroxide  30 mL DAILY   • omeprazole  20 mg DAILY   • metoprolol tartrate  12.5 mg BID    Followed by   • [START ON 7/2/2022] metoprolol tartrate  25 mg BID   • aspirin EC  81 mg DAILY   • clopidogrel  75 mg DAILY   • atorvastatin  40 mg QHS   • insulin lispro  0-14 Units TID AC        Exam:   Physical Exam  Vitals and nursing note reviewed.   Constitutional:       Appearance: She is well-developed.   HENT:      Head: Normocephalic.   Eyes:      Extraocular Movements: Extraocular movements intact.      Pupils: Pupils are equal, round, and reactive to light.   Cardiovascular:      Rate and Rhythm: Normal rate and regular rhythm.   Pulmonary:      Effort: Pulmonary effort is normal.   Abdominal:      General: Bowel sounds are normal.      Palpations: Abdomen is soft.   Musculoskeletal:         General: Normal range of motion.      Cervical back: Normal range of motion.   Skin:     General: Skin is warm and dry.   Neurological:      General: No focal deficit present.      Mental Status: She is alert and oriented to person, place, and time.   Psychiatric:         Mood and Affect: Mood normal.         Behavior: Behavior normal.       Cardiac Medications:    ASA - Yes    Plavix - Yes    Post-operative Beta Blockers - Yes    Ace/ARB- No; contraindicated because of Normal EF    Statin - Yes    Aldactone- No; contraindicated because of Normal EF    Ejection Fraction:  70%    Telemetry:   7/1 SR    Assessment/Plan:  POD 1  HDS, SR, neuro intact, wounds CDI, abdomen soft, fluid balance positive, wt up,  Chest tube output minimal and negative for air leak.  Plan:  Dc chest tubes and garza, IS/ambulate. CPM.    Disposition:  TBD

## 2022-07-01 NOTE — THERAPY
Missed Therapy     Patient Name: Rony Chilel  Age:  67 y.o., Sex:  female  Medical Record #: [id number]  Today's Date: 7/1/2022 07/01/22 0752   Interdisciplinary Plan of Care Collaboration   Collaboration Comments Pt s/p OHS, POD1. Will f/u POD2 per protocol.

## 2022-07-01 NOTE — CARE PLAN
Problem: Hyperinflation  Goal: Prevent or improve atelectasis  Description: Target End Date:  3 to 4 days    1. Instruct incentive spirometry usage  2.  Perform hyperinflation therapy as indicated  Outcome: Progressing  Flowsheets (Taken 6/30/2022 1752)  Hyperinflation Protocol Goals/Outcome: Stable Vital Capacity x24 hrs and Patient Understands / uses I.S.  Hyperinflation Protocol Indications: Abdominal/Thoracic Surgeries (Open Heart)       Respiratory Update    Treatment modality: PEP  Frequency: QID    IS: 1250    Pt tolerating current treatments well with no adverse reactions.

## 2022-07-01 NOTE — PROGRESS NOTES
Received report and assumed patient care. Pt is sitting up in the chair. Low dose levo for BP support. VSS. Pt is on RA. A&O x4, pain controlled.

## 2022-07-01 NOTE — PROGRESS NOTES
Critical Care Progress Note    Date of admission  6/30/2022    Chief Complaint  67 y.o. female with a history of hypertension, hyperlipidemia who had STEMI in 5/2022 and received ALCON to the RCA, also found to have significant multivessel disease and was referred for CABG.  Admitted to hospital 6/30 for CABG x3.    Hospital Course  6/30-CABG, extubated in the afternoon  7/1-doing well, weaning pressors      Interval Problem Update  Reviewed last 24 hour events:  Tmax: Afebrile  Diet: Advance as tolerated  I/O: +2.3 L yesterday, +3 L for admission  Mobility: Out of bed to chair  Antibx: None    Review of Systems  Review of Systems   Constitutional: Negative for chills, fever and malaise/fatigue.   HENT: Negative for congestion and sore throat.    Eyes: Negative.    Respiratory: Negative for sputum production and shortness of breath.    Cardiovascular: Positive for chest pain. Negative for palpitations.   Gastrointestinal: Negative for abdominal pain, nausea and vomiting.   Genitourinary: Negative.    Musculoskeletal: Negative.    Skin: Negative.    Neurological: Negative for focal weakness and headaches.   All other systems reviewed and are negative.       Vital Signs for last 24 hours   Temp:  [36.1 °C (97 °F)-37.1 °C (98.8 °F)] 36.6 °C (97.9 °F)  Pulse:  [57-72] 69  Resp:  [14-43] 22  BP: ()/(43-63) 81/46  SpO2:  [94 %-100 %] 94 %    Hemodynamic parameters for last 24 hours  CVP:  [8 MM HG-296 MM HG] 11 MM HG    Respiratory Information for the last 24 hours  Vent Mode: Spont  PEEP/CPAP: 8  MAP: 11  Control VTE (exp VT): 328    Physical Exam   Physical Exam  Vitals and nursing note reviewed. Exam conducted with a chaperone present.   Constitutional:       General: She is awake. She is not in acute distress.  HENT:      Head: Normocephalic.      Mouth/Throat:      Mouth: Mucous membranes are moist.   Eyes:      Extraocular Movements: Extraocular movements intact.   Cardiovascular:      Rate and Rhythm: Normal  rate and regular rhythm.      Pulses: Normal pulses.      Comments: Mediastinal tubes in place, trace drainage  Pulmonary:      Effort: Pulmonary effort is normal. No respiratory distress.   Abdominal:      General: There is no distension.      Palpations: Abdomen is soft.      Tenderness: There is no abdominal tenderness.   Musculoskeletal:         General: Normal range of motion.      Cervical back: Normal range of motion and neck supple.   Skin:     General: Skin is warm.      Capillary Refill: Capillary refill takes less than 2 seconds.   Neurological:      General: No focal deficit present.      Mental Status: She is alert.         Medications  Current Facility-Administered Medications   Medication Dose Route Frequency Provider Last Rate Last Admin   • Respiratory Therapy Consult   Nebulization Continuous RT Marianne Obregon A.P.N.       • NS infusion   Intravenous Continuous Marianne Obregon A.P.N. 10 mL/hr at 06/30/22 1236 New Bag at 06/30/22 1236   • calcium CHLORIDE 1,000 mg in dextrose 5% 100 mL IVPB  1,000 mg Intravenous Once PRN Marianne Obregon A.P.N.       • magnesium sulfate in D5W IVPB premix 1 g  1 g Intravenous DAILY Marianne Obregon A.P.N. 100 mL/hr at 07/01/22 0611 1 g at 07/01/22 0611   • K+ Scale: Goal of 4.5  1 Each Intravenous Q6HRS Marianne Obregon A.P.N.   1 Each at 07/01/22 0000   • Pharmacy Consult Request ...Pain Management Review 1 Each  1 Each Other PHARMACY TO DOSE Marianne Obregon A.P.N.       • acetaminophen (TYLENOL) tablet 1,000 mg  1,000 mg Oral Q6HRS Marianne Obregon A.P.N.   1,000 mg at 07/01/22 0543    Followed by   • [START ON 7/5/2022] acetaminophen (TYLENOL) tablet 1,000 mg  1,000 mg Oral Q6HRS PRN Marianne Obregon A.P.N.       • senna-docusate (PERICOLACE or SENOKOT S) 8.6-50 MG per tablet 2 Tablet  2 Tablet Oral BID Marianne Obregon A.P.N.   2 Tablet at 07/01/22 0542    And   • polyethylene glycol/lytes (MIRALAX) PACKET 1 Packet  1 Packet Oral DAILY Marianne Obregon A.P.N.   1  Packet at 07/01/22 0543    And   • [START ON 7/2/2022] magnesium hydroxide (MILK OF MAGNESIA) suspension 30 mL  30 mL Oral DAILY Marianne Obregon, A.P.N.        And   • bisacodyl (DULCOLAX) suppository 10 mg  10 mg Rectal QDAY PRN Marianne Obregon, A.P.N.       • omeprazole (PRILOSEC) capsule 20 mg  20 mg Oral DAILY Marianne Obregon, A.P.N.   20 mg at 07/01/22 0542   • electrolyte-A (PLASMALYTE-A) infusion   Intravenous PRN Marianne Obregon, A.P.N. 999 mL/hr at 06/30/22 1643 New Bag at 06/30/22 1643   • metoprolol tartrate (LOPRESSOR) tablet 12.5 mg  12.5 mg Oral BID Marianne Obregon, A.P.N.        Followed by   • [START ON 7/2/2022] metoprolol tartrate (LOPRESSOR) tablet 25 mg  25 mg Oral BID Marianne Obregon, A.P.N.       • aspirin EC (ECOTRIN) tablet 81 mg  81 mg Oral DAILY Marianne Obregon, A.P.N.   81 mg at 07/01/22 0542   • clopidogrel (PLAVIX) tablet 75 mg  75 mg Oral DAILY Marianne Obregon, A.P.N.   75 mg at 07/01/22 0542   • atorvastatin (LIPITOR) tablet 40 mg  40 mg Oral QHS Marianne Obregon, A.P.N.   40 mg at 06/30/22 2009   • clevidipine (CLEVIPREX) IV emulsion  0-21 mg/hr Intravenous Continuous Marianne Obregon, A.P.N.   Stopped at 06/30/22 1432   • traMADol (ULTRAM) 50 MG tablet 50 mg  50 mg Oral Q4HRS PRN Marianne Obregon, A.P.N.   50 mg at 06/30/22 1821   • oxyCODONE immediate-release (ROXICODONE) tablet 5 mg  5 mg Oral Q3HRS PRN Marianne Obregon, A.P.N.   5 mg at 06/30/22 1910    Or   • oxyCODONE immediate release (ROXICODONE) tablet 10 mg  10 mg Oral Q3HRS PRN Marianne Obregon, A.P.N.   10 mg at 07/01/22 0327    Or   • fentaNYL (SUBLIMAZE) injection 50 mcg  50 mcg Intravenous Q3HRS PRN Marianne Obregon, A.P.N.       • midazolam (Versed) 2 MG/2ML injection 2 mg  2 mg Intravenous Q HOUR PRN Marianne Obregon, A.P.N.       • dexmedetomidine (Precedex) 400 mcg/100mL D5W premix infusion  0-1.5 mcg/kg/hr Intravenous Continuous Marianne Obregon, A.P.N.   Stopped at 07/01/22 4223   • sodium bicarbonate 8.4 % injection 50 mEq  50 mEq  Intravenous Q HOUR PRN Marianne Obregon A.P.N.       • morphine 4 MG/ML injection 4 mg  4 mg Intravenous Q HOUR PRN Marianne Obregon A.P.N.       • ondansetron (ZOFRAN) syringe/vial injection 8 mg  8 mg Intravenous Q6HRS PRN Marianne Obregon A.P.N.        Or   • prochlorperazine (COMPAZINE) injection 10 mg  10 mg Intravenous Q6HRS PRN Marianne Obregon A.P.N.        Or   • promethazine (PHENERGAN) suppository 25 mg  25 mg Rectal Q6HRS PRN Marianne Obregon, A.P.N.       • acetaminophen (Tylenol) tablet 650 mg  650 mg Oral Q4HRS PRN Marianne Obregon A.P.N.        Or   • acetaminophen (TYLENOL) suppository 650 mg  650 mg Rectal Q4HRS PRN Marianne Obregon A.P.N.       • mag hydrox-al hydrox-simeth (MAALOX PLUS ES or MYLANTA DS) suspension 30 mL  30 mL Oral Q4HRS PRN Marianne Obregon A.P.N.       • diphenhydrAMINE (BENADRYL) tablet/capsule 25 mg  25 mg Oral HS PRN - MR X 1 Marianne Obregon A.P.N.       • hydrOXYzine HCl (ATARAX) tablet 10 mg  10 mg Oral Q4HRS PRN Marianne Obregon A.P.N.       • insulin regular (HumuLIN R,NovoLIN R) injection  0-14 Units Intravenous Once Marianne Obregon A.P.N.       • insulin lispro (AdmeLOG,HumaLOG) injection  0-14 Units Subcutaneous TID AC Marianne Obregon A.P.N.       • insulin regular (HumuLIN R) 100 Units in  mL Infusion  0-29 Units/hr Intravenous Continuous Marianne Obregon A.P.N.   Paused at 06/30/22 2119   • dextrose 50% (D50W) injection 12.5-25 g  12.5-25 g Intravenous PRN Marianne Obregon A.P.N.       • MD Alert...Pharmacy to initiate transition from insulin infusion to subcutaneous insulin for cardiothoracic surgery 1 Each  1 Each Other Continuous Marianne Obregon A.P.N.       • EPINEPHrine (Adrenalin) infusion 4 mg/250 mL (premix)  0-0.2 mcg/kg/min Intravenous Continuous Marianne Obregon A.P.N.   Stopped at 07/01/22 0000   • norepinephrine (Levophed) 8 mg in 250 mL NS infusion (premix)  0-30 mcg/min Intravenous Continuous Marianne Obregon A.P.N. 1.9 mL/hr at 07/01/22 0606 1 mcg/min at  07/01/22 0606       Fluids    Intake/Output Summary (Last 24 hours) at 7/1/2022 0625  Last data filed at 7/1/2022 0600  Gross per 24 hour   Intake 5576.45 ml   Output 2576 ml   Net 3000.45 ml       Laboratory  Recent Labs     06/30/22  1228 06/30/22  1322 06/30/22  1426   ISTATAPH 7.407 7.457 7.403   ISTATAPCO2 38.4* 29.7 34.6   ISTATAPO2 165* 99* 124*   ISTATATCO2 25 22 23   [id number] 100* 98 99   ISTATARTHCO3 24.2 20.9 21.6   ISTATARTBE 0 -2 -3   ISTATTEMP 36.3 C 36.0 C 36.0 C   ISTATFIO2 100 80 60   ISTATSPEC Arterial Arterial Arterial   ISTATAPHTC 7.418 7.472 7.418   [id number] 161* 93* 118*         Recent Labs     06/28/22  1216 06/30/22  1221 06/30/22  1808 06/30/22  2300 07/01/22  0400   SODIUM 135  --   --   --  136   POTASSIUM 3.9  --  3.9 4.1 4.4   CHLORIDE 98  --   --   --  104   CO2 25  --   --   --  20   BUN 15  --   --   --  13   CREATININE 0.57  --   --   --  0.33*   MAGNESIUM  --  2.7*  --   --   --    CALCIUM 9.5  --   --   --  7.4*     Recent Labs     06/28/22  1216 07/01/22  0400   ALTSGPT 18  --    ASTSGOT 12  --    ALKPHOSPHAT 117*  --    TBILIRUBIN 0.5  --    GLUCOSE 110* 156*     Recent Labs     06/28/22  1216 07/01/22  0400   WBC 8.7 12.2*   NEUTSPOLYS 68.60  --    LYMPHOCYTES 20.50*  --    MONOCYTES 9.60  --    EOSINOPHILS 0.30  --    BASOPHILS 0.80  --    ASTSGOT 12  --    ALTSGPT 18  --    ALKPHOSPHAT 117*  --    TBILIRUBIN 0.5  --      Recent Labs     06/28/22  1216 06/30/22  1221 07/01/22  0400   RBC 4.31  --  3.68*   HEMOGLOBIN 13.9  --  11.4*   HEMATOCRIT 38.8  --  32.3*   PLATELETCT 331 158* 173   PROTHROMBTM 13.6 18.4*  --    APTT 27.6 77.0*  --    INR 1.08 1.57*  --        Imaging  X-Ray:  I have personally reviewed the images and compared with prior images. and My impression is: Likely left lower lobe effusion    Assessment/Plan  * S/P CABG x 3  Assessment & Plan  Procedure: CABG x3  *LIMA-LAD  *SVG-diagonal 1  *SVG-OM1  Date of surgery: 6/30  Surgeon:   Fidencioackstedt    Extubated  Weaning pressors  Out of bed to chair    CAD- (present on admission)  Assessment & Plan  STEMI last month @ [facility]'s  ALCON To RCA x2 (proximal and distal)  Now s/p CABG x3 (LAD/Diag/OM)    DAPT  Statin  Beta blocker (when stable from post operative perspective)       VTE:  Per cardiac surgery  Ulcer: PPI  Lines: Central Line  Ongoing indication addressed, Arterial Line  Ongoing indication addressed and Valles Catheter  Ongoing indication addressed      I have performed a physical exam and reviewed and updated ROS and Plan today (7/1/2022). In review of yesterday's note (6/30/2022), there are no changes except as documented above.     [facility] will sign off

## 2022-07-02 ENCOUNTER — APPOINTMENT (OUTPATIENT)
Dept: RADIOLOGY | Facility: MEDICAL CENTER | Age: 67
DRG: 236 | End: 2022-07-02
Attending: NURSE PRACTITIONER
Payer: MEDICARE

## 2022-07-02 LAB
ANION GAP SERPL CALC-SCNC: 9 MMOL/L (ref 7–16)
BUN SERPL-MCNC: 17 MG/DL (ref 8–22)
CALCIUM SERPL-MCNC: 8.2 MG/DL (ref 8.5–10.5)
CHLORIDE SERPL-SCNC: 90 MMOL/L (ref 96–112)
CO2 SERPL-SCNC: 24 MMOL/L (ref 20–33)
CREAT SERPL-MCNC: 0.48 MG/DL (ref 0.5–1.4)
EKG IMPRESSION: NORMAL
ERYTHROCYTE [DISTWIDTH] IN BLOOD BY AUTOMATED COUNT: 47.2 FL (ref 35.9–50)
GFR SERPLBLD CREATININE-BSD FMLA CKD-EPI: 104 ML/MIN/1.73 M 2
GLUCOSE BLD STRIP.AUTO-MCNC: 109 MG/DL (ref 65–99)
GLUCOSE BLD STRIP.AUTO-MCNC: 129 MG/DL (ref 65–99)
GLUCOSE BLD STRIP.AUTO-MCNC: 149 MG/DL (ref 65–99)
GLUCOSE SERPL-MCNC: 101 MG/DL (ref 65–99)
HCT VFR BLD AUTO: 29.6 % (ref 37–47)
HGB BLD-MCNC: 10.4 G/DL (ref 12–16)
MCH RBC QN AUTO: 31 PG (ref 27–33)
MCHC RBC AUTO-ENTMCNC: 35.1 G/DL (ref 33.6–35)
MCV RBC AUTO: 88.4 FL (ref 81.4–97.8)
PLATELET # BLD AUTO: 153 K/UL (ref 164–446)
PMV BLD AUTO: 9.4 FL (ref 9–12.9)
POTASSIUM SERPL-SCNC: 3.7 MMOL/L (ref 3.6–5.5)
RBC # BLD AUTO: 3.35 M/UL (ref 4.2–5.4)
SODIUM SERPL-SCNC: 123 MMOL/L (ref 135–145)
WBC # BLD AUTO: 12.8 K/UL (ref 4.8–10.8)

## 2022-07-02 PROCEDURE — A9270 NON-COVERED ITEM OR SERVICE: HCPCS | Performed by: CLINICAL NURSE SPECIALIST

## 2022-07-02 PROCEDURE — 76604 US EXAM CHEST: CPT

## 2022-07-02 PROCEDURE — A9270 NON-COVERED ITEM OR SERVICE: HCPCS | Performed by: NURSE PRACTITIONER

## 2022-07-02 PROCEDURE — 700111 HCHG RX REV CODE 636 W/ 250 OVERRIDE (IP): Performed by: NURSE PRACTITIONER

## 2022-07-02 PROCEDURE — 700111 HCHG RX REV CODE 636 W/ 250 OVERRIDE (IP): Performed by: CLINICAL NURSE SPECIALIST

## 2022-07-02 PROCEDURE — 93010 ELECTROCARDIOGRAM REPORT: CPT | Performed by: INTERNAL MEDICINE

## 2022-07-02 PROCEDURE — 99024 POSTOP FOLLOW-UP VISIT: CPT | Performed by: NURSE PRACTITIONER

## 2022-07-02 PROCEDURE — 770020 HCHG ROOM/CARE - TELE (206)

## 2022-07-02 PROCEDURE — 97166 OT EVAL MOD COMPLEX 45 MIN: CPT

## 2022-07-02 PROCEDURE — 80048 BASIC METABOLIC PNL TOTAL CA: CPT

## 2022-07-02 PROCEDURE — 700102 HCHG RX REV CODE 250 W/ 637 OVERRIDE(OP): Performed by: CLINICAL NURSE SPECIALIST

## 2022-07-02 PROCEDURE — 82962 GLUCOSE BLOOD TEST: CPT | Mod: 91

## 2022-07-02 PROCEDURE — 700102 HCHG RX REV CODE 250 W/ 637 OVERRIDE(OP): Performed by: NURSE PRACTITIONER

## 2022-07-02 PROCEDURE — 71045 X-RAY EXAM CHEST 1 VIEW: CPT

## 2022-07-02 PROCEDURE — 94669 MECHANICAL CHEST WALL OSCILL: CPT

## 2022-07-02 PROCEDURE — 97116 GAIT TRAINING THERAPY: CPT

## 2022-07-02 PROCEDURE — 97163 PT EVAL HIGH COMPLEX 45 MIN: CPT

## 2022-07-02 PROCEDURE — 85027 COMPLETE CBC AUTOMATED: CPT

## 2022-07-02 RX ORDER — BENZONATATE 100 MG/1
100 CAPSULE ORAL 3 TIMES DAILY PRN
Status: DISCONTINUED | OUTPATIENT
Start: 2022-07-02 | End: 2022-07-06 | Stop reason: HOSPADM

## 2022-07-02 RX ORDER — GUAIFENESIN 600 MG/1
600 TABLET, EXTENDED RELEASE ORAL EVERY 12 HOURS
Status: DISCONTINUED | OUTPATIENT
Start: 2022-07-02 | End: 2022-07-06 | Stop reason: HOSPADM

## 2022-07-02 RX ORDER — HYDROCODONE BITARTRATE AND ACETAMINOPHEN 5; 325 MG/1; MG/1
1-2 TABLET ORAL EVERY 6 HOURS PRN
Status: DISCONTINUED | OUTPATIENT
Start: 2022-07-02 | End: 2022-07-03

## 2022-07-02 RX ORDER — ALPRAZOLAM 0.25 MG/1
0.25 TABLET ORAL EVERY 6 HOURS PRN
Status: DISCONTINUED | OUTPATIENT
Start: 2022-07-02 | End: 2022-07-06 | Stop reason: HOSPADM

## 2022-07-02 RX ORDER — POTASSIUM CHLORIDE 20 MEQ/1
20 TABLET, EXTENDED RELEASE ORAL DAILY
Status: DISCONTINUED | OUTPATIENT
Start: 2022-07-02 | End: 2022-07-06 | Stop reason: HOSPADM

## 2022-07-02 RX ORDER — FUROSEMIDE 10 MG/ML
40 INJECTION INTRAMUSCULAR; INTRAVENOUS
Status: DISCONTINUED | OUTPATIENT
Start: 2022-07-02 | End: 2022-07-05

## 2022-07-02 RX ADMIN — METOPROLOL TARTRATE 25 MG: 25 TABLET, FILM COATED ORAL at 17:53

## 2022-07-02 RX ADMIN — SENNOSIDES AND DOCUSATE SODIUM 2 TABLET: 50; 8.6 TABLET ORAL at 17:53

## 2022-07-02 RX ADMIN — ACETAMINOPHEN 1000 MG: 500 TABLET, FILM COATED ORAL at 17:54

## 2022-07-02 RX ADMIN — HYDROCODONE BITARTRATE AND ACETAMINOPHEN 1 TABLET: 5; 325 TABLET ORAL at 00:56

## 2022-07-02 RX ADMIN — MAGNESIUM SULFATE HEPTAHYDRATE 1 G: 1 INJECTION, SOLUTION INTRAVENOUS at 05:51

## 2022-07-02 RX ADMIN — CITALOPRAM HYDROBROMIDE 20 MG: 20 TABLET ORAL at 17:53

## 2022-07-02 RX ADMIN — CLOPIDOGREL BISULFATE 75 MG: 75 TABLET ORAL at 05:51

## 2022-07-02 RX ADMIN — ASPIRIN 81 MG: 81 TABLET, COATED ORAL at 05:51

## 2022-07-02 RX ADMIN — SENNOSIDES AND DOCUSATE SODIUM 2 TABLET: 50; 8.6 TABLET ORAL at 05:51

## 2022-07-02 RX ADMIN — TRAMADOL HYDROCHLORIDE 50 MG: 50 TABLET, COATED ORAL at 03:48

## 2022-07-02 RX ADMIN — POLYETHYLENE GLYCOL 3350 1 PACKET: 17 POWDER, FOR SOLUTION ORAL at 05:51

## 2022-07-02 RX ADMIN — METOPROLOL TARTRATE 25 MG: 25 TABLET, FILM COATED ORAL at 05:52

## 2022-07-02 RX ADMIN — ALUMINUM HYDROXIDE, MAGNESIUM HYDROXIDE, AND DIMETHICONE 30 ML: 400; 400; 40 SUSPENSION ORAL at 09:12

## 2022-07-02 RX ADMIN — ATORVASTATIN CALCIUM 40 MG: 40 TABLET, FILM COATED ORAL at 21:25

## 2022-07-02 RX ADMIN — GUAIFENESIN 600 MG: 600 TABLET, EXTENDED RELEASE ORAL at 17:53

## 2022-07-02 RX ADMIN — HYDROCODONE BITARTRATE AND ACETAMINOPHEN 1 TABLET: 5; 325 TABLET ORAL at 07:00

## 2022-07-02 RX ADMIN — DIPHENHYDRAMINE HYDROCHLORIDE 25 MG: 25 TABLET ORAL at 03:45

## 2022-07-02 RX ADMIN — FUROSEMIDE 40 MG: 10 INJECTION, SOLUTION INTRAMUSCULAR; INTRAVENOUS at 09:28

## 2022-07-02 RX ADMIN — GUAIFENESIN 600 MG: 600 TABLET, EXTENDED RELEASE ORAL at 09:12

## 2022-07-02 RX ADMIN — POTASSIUM CHLORIDE 20 MEQ: 1500 TABLET, EXTENDED RELEASE ORAL at 09:28

## 2022-07-02 RX ADMIN — TRAMADOL HYDROCHLORIDE 50 MG: 50 TABLET, COATED ORAL at 11:44

## 2022-07-02 RX ADMIN — HYDROCODONE BITARTRATE AND ACETAMINOPHEN 1 TABLET: 5; 325 TABLET ORAL at 17:53

## 2022-07-02 RX ADMIN — ONDANSETRON 8 MG: 2 INJECTION INTRAMUSCULAR; INTRAVENOUS at 21:26

## 2022-07-02 RX ADMIN — MAGNESIUM HYDROXIDE 30 ML: 400 SUSPENSION ORAL at 11:44

## 2022-07-02 RX ADMIN — ALPRAZOLAM 0.25 MG: 0.25 TABLET ORAL at 05:51

## 2022-07-02 RX ADMIN — OMEPRAZOLE 20 MG: 20 CAPSULE, DELAYED RELEASE ORAL at 05:51

## 2022-07-02 ASSESSMENT — COGNITIVE AND FUNCTIONAL STATUS - GENERAL
TOILETING: A LOT
EATING MEALS: A LITTLE
WALKING IN HOSPITAL ROOM: A LITTLE
MOVING TO AND FROM BED TO CHAIR: A LOT
CLIMB 3 TO 5 STEPS WITH RAILING: A LITTLE
MOBILITY SCORE: 16
PERSONAL GROOMING: A LITTLE
SUGGESTED CMS G CODE MODIFIER DAILY ACTIVITY: CK
TURNING FROM BACK TO SIDE WHILE IN FLAT BAD: A LITTLE
HELP NEEDED FOR BATHING: A LOT
DRESSING REGULAR LOWER BODY CLOTHING: A LOT
MOVING FROM LYING ON BACK TO SITTING ON SIDE OF FLAT BED: A LOT
SUGGESTED CMS G CODE MODIFIER MOBILITY: CK
STANDING UP FROM CHAIR USING ARMS: A LITTLE
DAILY ACTIVITIY SCORE: 15
DRESSING REGULAR UPPER BODY CLOTHING: A LITTLE

## 2022-07-02 ASSESSMENT — GAIT ASSESSMENTS
GAIT LEVEL OF ASSIST: CONTACT GUARD ASSIST
DISTANCE (FEET): 150
DEVIATION: BRADYKINETIC
ASSISTIVE DEVICE: FRONT WHEEL WALKER

## 2022-07-02 ASSESSMENT — PAIN DESCRIPTION - PAIN TYPE
TYPE: ACUTE PAIN

## 2022-07-02 ASSESSMENT — FIBROSIS 4 INDEX: FIB4 SCORE: 1.22

## 2022-07-02 NOTE — PROGRESS NOTES
RUTH Daniels paged for pt's 8/10 pain, pt refusing to take oxycodone for severe itching despite benadryl administrations. Pt given available PRNs without relief. Given new orders, see MAR.

## 2022-07-02 NOTE — PROGRESS NOTES
Monitor Summary    Rhythm: SR  Heart Rate: 70-90s  Ectopy: rare pacs,   Measurements:  0.16/0.12/0.47              12 hr chart check

## 2022-07-02 NOTE — THERAPY
Occupational Therapy   Initial Evaluation     Patient Name: Rony Chilel  Age:  67 y.o., Sex:  female  Medical Record #: [id number]  Today's Date: 7/2/2022     Precautions  Precautions: Fall Risk, Cardiac Precautions (See Comments), Sternal Precautions (See Comments)    Assessment  Patient seen for OT eval reporting self as I with ADLs and mobility. Patient, upon eval, presents with decreased ADLs, mobility, endurance, tolerance, balance, strength necessitating Max A FWW standing robby care and Min A with FWW mobility.     Plan    Recommend Occupational Therapy 4 times per week until therapy goals are met for the following treatments:  Self Care/Activities of Daily Living, Therapeutic Activities, and Therapeutic Exercises.    DC Equipment Recommendations: Unable to determine at this time  Discharge Recommendations: Recommend home health for continued occupational therapy services (consider cardiac rehab)       Objective      MH 5 NADIR with cats, neighbors can assist, RTS, no AE. Mod I ADLs and mobility at baseline.

## 2022-07-02 NOTE — PROGRESS NOTES
Cardiovascular Surgery Progress Note    Name: Rony Chilel  MRN: [id number]  : 1955  Admit Date: 2022  5:53 AM  2 Days Post-Op     Procedure:  Procedure(s) and Anesthesia Type:     * CABG, WITH ENDOSCOPIC VEIN PROCUREMENT-X3 - General     * ECHOCARDIOGRAM, TRANSESOPHAGEAL, INTRAOPERATIVE - General    Vitals:  Vitals:    22 0000 22 0400 22 0600 22 0654   BP: (!) 136/31 (!) 145/67 135/74    Pulse: 72 77 76 78   Resp:    18   Temp: 36.6 °C (97.9 °F)  36.9 °C (98.4 °F)    TempSrc: Temporal  Temporal    SpO2: 97% 96% 95% 92%   Weight:   85.5 kg (188 lb 7.9 oz)    Height:          Temp (24hrs), Av.7 °C (98.1 °F), Min:36.6 °C (97.9 °F), Max:36.9 °C (98.4 °F)      Respiratory:    Respiration: 18, Pulse Oximetry: 92 %       Fluids:    Intake/Output Summary (Last 24 hours) at 2022 0903  Last data filed at 2022 0600  Gross per 24 hour   Intake 630.63 ml   Output 1230 ml   Net -599.37 ml     Admit weight: Weight: 77 kg (169 lb 12.1 oz)  Current weight: Weight: 85.5 kg (188 lb 7.9 oz) (22 0600)    Labs:  Recent Labs     22  1221 22  0400 22  0332   WBC  --  12.2* 12.8*   RBC  --  3.68* 3.35*   HEMOGLOBIN  --  11.4* 10.4*   HEMATOCRIT  --  32.3* 29.6*   MCV  --  87.8 88.4   MCH  --  31.0 31.0   MCHC  --  35.3* 35.1*   RDW  --  46.9 47.2   PLATELETCT 158* 173 153*   MPV  --  9.7 9.4     Recent Labs     22  2300 22  0400 22  0332   SODIUM  --  136 123*   POTASSIUM 4.1 4.4 3.7   CHLORIDE  --  104 90*   CO2  --  20 24   GLUCOSE  --  156* 101*   BUN  --  13 17   CREATININE  --  0.33* 0.48*   CALCIUM  --  7.4* 8.2*     Recent Labs     22  1221   APTT 77.0*   INR 1.57*         Medications:  Scheduled Medications   Medication Dose Frequency   • guaiFENesin ER  600 mg Q12HRS   • insulin regular  2-9 Units 4X/DAY ACHS   • citalopram  20 mg Q EVENING   • Pharmacy Consult Request  1 Each PHARMACY TO DOSE   • acetaminophen  1,000 mg Q6HRS   •  senna-docusate  2 Tablet BID    And   • polyethylene glycol/lytes  1 Packet DAILY    And   • magnesium hydroxide  30 mL DAILY   • omeprazole  20 mg DAILY   • metoprolol tartrate  25 mg BID   • aspirin EC  81 mg DAILY   • clopidogrel  75 mg DAILY   • atorvastatin  40 mg QHS        Exam:   Physical Exam  Vitals and nursing note reviewed.   Constitutional:       Appearance: She is well-developed.   HENT:      Head: Normocephalic.   Eyes:      Extraocular Movements: Extraocular movements intact.      Pupils: Pupils are equal, round, and reactive to light.   Cardiovascular:      Rate and Rhythm: Normal rate and regular rhythm.   Pulmonary:      Effort: Pulmonary effort is normal.   Abdominal:      General: Bowel sounds are normal.      Palpations: Abdomen is soft.   Musculoskeletal:         General: Normal range of motion.      Cervical back: Normal range of motion.   Skin:     General: Skin is warm and dry.   Neurological:      General: No focal deficit present.      Mental Status: She is alert and oriented to person, place, and time.   Psychiatric:         Mood and Affect: Mood normal.         Behavior: Behavior normal.       Cardiac Medications:    ASA - Yes    Plavix - Yes    Post-operative Beta Blockers - Yes    Ace/ARB- No; contraindicated because of Normal EF    Statin - Yes    Aldactone- No; contraindicated because of Normal EF    Ejection Fraction:  70%    Telemetry:   7/1 SR  7/2 SR r Pac    Assessment/Plan:  POD 1  HDS, SR, neuro intact, wounds CDI, abdomen soft, fluid balance positive, wt up,  Chest tube output minimal and negative for air leak.  Plan:  Dc chest tubes and garza, IS/ambulate. CPM.    POD 2 HDS, SR.  Neuro intact.  Complaints of cough overnight.  Hematoma superior to incision, pt reports as larger.  Itchiness with oxycodone, changed to norco, tolerating better.  0.5LNC.  Plan: US mass above incision.  Tessalon pearls, mucinex for cough.  Wean oxygen as tolerated.  Start  diuresis/kdur.    Disposition:  TBD

## 2022-07-02 NOTE — PROGRESS NOTES
Edema noted above incision site spreading up throat and to the left. Chuck MIRANDA paged, given orders for CXR and 0.25 xanax q 6hr.

## 2022-07-02 NOTE — CARE PLAN
The patient is Watcher - Medium risk of patient condition declining or worsening    Shift Goals  Clinical Goals: Control pain, improve IS  Patient Goals: Control pain  Family Goals: JASWINDER    Progress made toward(s) clinical / shift goals:      Problem: Post op day 2 CABG/Heart Valve Replacement  Goal: Optimal care of the post op CABG/heart valve replacement post op day 2  Outcome: Progressing  Intervention: FSBS: when 2 consecutive BS < 130 after post op day 2, discontinue FSBS unless patient is insulin dependent diabetic  Note: NA  Intervention: Up in chair for all meals  Note: Complete  Intervention: Ambulate 4 times daily, increasing the distance each time  Note: Two walks completed this shift. Night shift will complete final 2 walks.  Intervention: Stand at sink and wash up with assistance.  Clean incisions twice daily with soap and water.  Note: Incisions cleaned by this RN.  Intervention: IS q 1 hour while awake and record best IS volume  Note: Complete  Intervention: Consider pacer wire removal by MD  Note: NA  Intervention: Consider removal of garza and chest tube if not already done  Note: Complete     Problem: Skin Integrity  Goal: Skin integrity is maintained or improved  Outcome: Progressing     Problem: Fall Risk  Goal: Patient will remain free from falls  Outcome: Progressing       Patient is not progressing towards the following goals:

## 2022-07-02 NOTE — THERAPY
"Physical Therapy   Initial Evaluation     Patient Name: Rony Chilel  Age:  67 y.o., Sex:  female  Medical Record #: [id number]  Today's Date: 7/2/2022     Precautions  Precautions: Fall Risk;Cardiac Precautions (See Comments);Sternal Precautions (See Comments)    Assessment  67 y.o. female with a history of hypertension, hyperlipidemia who had STEMI in 5/2022 and received ALCON to the RCA, also found to have significant multivessel disease and was referred for CABG.  Patient is now POD #2 s/p CABG x3.  She presents with pain, impaired balance, gait and activity tolerance.   She was able to ambulate with CGA and FWW with cueing for pacing.  She needs ModA for STS from low surface and will need further practice with bed mobility.  Patient lives alone but will have assist as needed.  She was educated about return to activity, sternal precautions and cardiac rehab.  Will continue to follow while in house and anticipate she will progress to home with HHPT.     Plan    Recommend Physical Therapy 4 times per week until therapy goals are met for the following treatments:  Bed Mobility, Equipment, Gait Training, Neuro Re-Education / Balance, Stair Training, Therapeutic Activities, and Therapeutic Exercises    DC Equipment Recommendations: Front-Wheel Walker  Discharge Recommendations: Recommend home health for continued physical therapy services       Subjective    \"Who are you? I'm going for a walk\"     Objective     07/02/22 1100   Precautions   Precautions Fall Risk;Cardiac Precautions (See Comments);Sternal Precautions (See Comments)   Pain 0 - 10 Group   Location Sternum   Location Orientation Right   Therapist Pain Assessment 5;Post Activity Pain Same as Prior to Activity   Prior Living Situation   Prior Services Home-Independent   Housing / Facility 1 Story House   Steps Into Home 5   Rail Both Rail (Steps into Home)   Equipment Owned Raised Toilet Seat Without Arms   Lives with - Patient's Self Care Capacity Alone " and Able to Care For Self   Comments Patient is a retired critical care RN, she reports she has 5 friends who will be able to provide assist as needed   Prior Level of Functional Mobility   Bed Mobility Independent   Transfer Status Independent   Ambulation Independent   Distance Ambulation (Feet)   (community distances)   Assistive Devices Used None   Stairs Independent   History of Falls   History of Falls No   Cognition    Cognition / Consciousness WDL   Level of Consciousness Alert   Comments pleasant and motivated   Passive ROM Lower Body   Passive ROM Lower Body WDL   Active ROM Lower Body    Active ROM Lower Body  WDL   Strength Lower Body   Lower Body Strength  WDL   Sensation Lower Body   Lower Extremity Sensation   WDL   Strength Upper Body   Upper Body Strength  X   Comments limtied by sternal precautions and pain   Balance Assessment   Sitting Balance (Static) Fair +   Sitting Balance (Dynamic) Fair +   Standing Balance (Static) Fair -   Standing Balance (Dynamic) Fair -   Weight Shift Sitting Poor   Weight Shift Standing Fair   Comments w/FWW   Gait Analysis   Gait Level Of Assist Contact Guard Assist   Assistive Device Front Wheel Walker   Distance (Feet) 150   # of Times Distance was Traveled 1   Deviation Bradykinetic  (diminished TRACIE)   Comments reports LE fatigue with increased distances, demos good self-pacing   Bed Mobility    Comments pt found up in chair and returned to chair   Functional Mobility   Sit to Stand Moderate Assist   Bed, Chair, Wheelchair Transfer Minimal Assist   How much difficulty does the patient currently have...   Turning over in bed (including adjusting bedclothes, sheets and blankets)? 3   Sitting down on and standing up from a chair with arms (e.g., wheelchair, bedside commode, etc.) 2   Moving from lying on back to sitting on the side of the bed? 2   How much help from another person does the patient currently need...   Moving to and from a bed to a chair (including a  wheelchair)? 3   Need to walk in a hospital room? 3   Climbing 3-5 steps with a railing? 3   6 clicks Mobility Score 16   Activity Tolerance   Sitting in Chair pre-post session   Standing 15 min   Edema / Skin Assessment   Comments sternal incision C/D/I   Patient / Family Goals    Patient / Family Goal #1 to go home   Short Term Goals    Short Term Goal # 1 in 6 visits patient will demo all bed mobility via log roll for safe DC   Short Term Goal # 2 in 6 visits patient will ambulate 400' supervision w/LRAD for safe DC   Short Term Goal # 3 in 6 visits patient will navigate 5 stairs with Amy and LRAD for safe DC   Education Group   Education Provided Role of Physical Therapist;Sternal Precautions;Cardiac Precautions;Use of Assistive Device   Cardiac Precautions Patient Response Patient;Acceptance;Explanation;Demonstration;Handout;Verbal Demonstration;Action Demonstration   Sternal Precautions Patient Response Patient;Acceptance;Explanation;Handout;Verbal Demonstration;Action Demonstration;Demonstration   Role of Physical Therapist Patient Response Patient;Acceptance;Demonstration;Explanation;Verbal Demonstration   Use of Assistive Device Patient Response Patient;Acceptance;Explanation;Demonstration;Verbal Demonstration;Action Demonstration   Problem List    Problems Pain;Impaired Bed Mobility;Impaired Transfers;Impaired Ambulation;Impaired Balance;Decreased Activity Tolerance   Anticipated Discharge Equipment and Recommendations   DC Equipment Recommendations Front-Wheel Walker   Discharge Recommendations Recommend home health for continued physical therapy services     Aelah Qiu, PT, DPT, GCS

## 2022-07-03 ENCOUNTER — APPOINTMENT (OUTPATIENT)
Dept: RADIOLOGY | Facility: MEDICAL CENTER | Age: 67
DRG: 236 | End: 2022-07-03
Attending: NURSE PRACTITIONER
Payer: MEDICARE

## 2022-07-03 LAB
ANION GAP SERPL CALC-SCNC: 8 MMOL/L (ref 7–16)
BUN SERPL-MCNC: 18 MG/DL (ref 8–22)
CALCIUM SERPL-MCNC: 8.1 MG/DL (ref 8.5–10.5)
CHLORIDE SERPL-SCNC: 90 MMOL/L (ref 96–112)
CO2 SERPL-SCNC: 25 MMOL/L (ref 20–33)
CREAT SERPL-MCNC: 0.42 MG/DL (ref 0.5–1.4)
ERYTHROCYTE [DISTWIDTH] IN BLOOD BY AUTOMATED COUNT: 46.9 FL (ref 35.9–50)
GFR SERPLBLD CREATININE-BSD FMLA CKD-EPI: 108 ML/MIN/1.73 M 2
GLUCOSE BLD STRIP.AUTO-MCNC: 108 MG/DL (ref 65–99)
GLUCOSE BLD STRIP.AUTO-MCNC: 110 MG/DL (ref 65–99)
GLUCOSE BLD STRIP.AUTO-MCNC: 149 MG/DL (ref 65–99)
GLUCOSE SERPL-MCNC: 134 MG/DL (ref 65–99)
HCT VFR BLD AUTO: 29.3 % (ref 37–47)
HGB BLD-MCNC: 10 G/DL (ref 12–16)
MCH RBC QN AUTO: 30.9 PG (ref 27–33)
MCHC RBC AUTO-ENTMCNC: 34.1 G/DL (ref 33.6–35)
MCV RBC AUTO: 90.4 FL (ref 81.4–97.8)
PLATELET # BLD AUTO: 157 K/UL (ref 164–446)
PMV BLD AUTO: 9.9 FL (ref 9–12.9)
POTASSIUM SERPL-SCNC: 4.5 MMOL/L (ref 3.6–5.5)
RBC # BLD AUTO: 3.24 M/UL (ref 4.2–5.4)
SODIUM SERPL-SCNC: 123 MMOL/L (ref 135–145)
WBC # BLD AUTO: 10.1 K/UL (ref 4.8–10.8)

## 2022-07-03 PROCEDURE — 80048 BASIC METABOLIC PNL TOTAL CA: CPT

## 2022-07-03 PROCEDURE — 85027 COMPLETE CBC AUTOMATED: CPT

## 2022-07-03 PROCEDURE — 94669 MECHANICAL CHEST WALL OSCILL: CPT

## 2022-07-03 PROCEDURE — 71045 X-RAY EXAM CHEST 1 VIEW: CPT

## 2022-07-03 PROCEDURE — 99024 POSTOP FOLLOW-UP VISIT: CPT | Performed by: NURSE PRACTITIONER

## 2022-07-03 PROCEDURE — 700102 HCHG RX REV CODE 250 W/ 637 OVERRIDE(OP): Performed by: NURSE PRACTITIONER

## 2022-07-03 PROCEDURE — 82962 GLUCOSE BLOOD TEST: CPT

## 2022-07-03 PROCEDURE — 700102 HCHG RX REV CODE 250 W/ 637 OVERRIDE(OP): Performed by: CLINICAL NURSE SPECIALIST

## 2022-07-03 PROCEDURE — A9270 NON-COVERED ITEM OR SERVICE: HCPCS | Performed by: CLINICAL NURSE SPECIALIST

## 2022-07-03 PROCEDURE — 700111 HCHG RX REV CODE 636 W/ 250 OVERRIDE (IP): Performed by: NURSE PRACTITIONER

## 2022-07-03 PROCEDURE — 770020 HCHG ROOM/CARE - TELE (206)

## 2022-07-03 PROCEDURE — A9270 NON-COVERED ITEM OR SERVICE: HCPCS | Performed by: NURSE PRACTITIONER

## 2022-07-03 RX ORDER — HYDROCODONE BITARTRATE AND ACETAMINOPHEN 5; 325 MG/1; MG/1
2 TABLET ORAL EVERY 6 HOURS PRN
Status: DISCONTINUED | OUTPATIENT
Start: 2022-07-03 | End: 2022-07-06 | Stop reason: HOSPADM

## 2022-07-03 RX ORDER — HYDROCODONE BITARTRATE AND ACETAMINOPHEN 5; 325 MG/1; MG/1
1 TABLET ORAL EVERY 6 HOURS PRN
Status: DISCONTINUED | OUTPATIENT
Start: 2022-07-03 | End: 2022-07-06 | Stop reason: HOSPADM

## 2022-07-03 RX ORDER — METOLAZONE 5 MG/1
5 TABLET ORAL
Status: DISCONTINUED | OUTPATIENT
Start: 2022-07-03 | End: 2022-07-06 | Stop reason: HOSPADM

## 2022-07-03 RX ADMIN — FUROSEMIDE 40 MG: 10 INJECTION, SOLUTION INTRAMUSCULAR; INTRAVENOUS at 04:34

## 2022-07-03 RX ADMIN — TRAMADOL HYDROCHLORIDE 50 MG: 50 TABLET, COATED ORAL at 17:31

## 2022-07-03 RX ADMIN — METOPROLOL TARTRATE 25 MG: 25 TABLET, FILM COATED ORAL at 04:34

## 2022-07-03 RX ADMIN — METOLAZONE 5 MG: 5 TABLET ORAL at 11:58

## 2022-07-03 RX ADMIN — HYDROCODONE BITARTRATE AND ACETAMINOPHEN 1 TABLET: 5; 325 TABLET ORAL at 01:19

## 2022-07-03 RX ADMIN — ACETAMINOPHEN 1000 MG: 500 TABLET, FILM COATED ORAL at 00:18

## 2022-07-03 RX ADMIN — CLOPIDOGREL BISULFATE 75 MG: 75 TABLET ORAL at 04:34

## 2022-07-03 RX ADMIN — ALPRAZOLAM 0.25 MG: 0.25 TABLET ORAL at 03:17

## 2022-07-03 RX ADMIN — OMEPRAZOLE 20 MG: 20 CAPSULE, DELAYED RELEASE ORAL at 04:34

## 2022-07-03 RX ADMIN — POTASSIUM CHLORIDE 20 MEQ: 1500 TABLET, EXTENDED RELEASE ORAL at 04:34

## 2022-07-03 RX ADMIN — GUAIFENESIN 600 MG: 600 TABLET, EXTENDED RELEASE ORAL at 04:34

## 2022-07-03 RX ADMIN — GUAIFENESIN 600 MG: 600 TABLET, EXTENDED RELEASE ORAL at 17:27

## 2022-07-03 RX ADMIN — ACETAMINOPHEN 1000 MG: 500 TABLET, FILM COATED ORAL at 11:58

## 2022-07-03 RX ADMIN — CITALOPRAM HYDROBROMIDE 20 MG: 20 TABLET ORAL at 17:27

## 2022-07-03 RX ADMIN — ACETAMINOPHEN 1000 MG: 500 TABLET, FILM COATED ORAL at 17:26

## 2022-07-03 RX ADMIN — ATORVASTATIN CALCIUM 40 MG: 40 TABLET, FILM COATED ORAL at 21:04

## 2022-07-03 RX ADMIN — METOPROLOL TARTRATE 25 MG: 25 TABLET, FILM COATED ORAL at 17:27

## 2022-07-03 RX ADMIN — TRAMADOL HYDROCHLORIDE 50 MG: 50 TABLET, COATED ORAL at 09:08

## 2022-07-03 RX ADMIN — ASPIRIN 81 MG: 81 TABLET, COATED ORAL at 04:34

## 2022-07-03 RX ADMIN — BENZONATATE 100 MG: 100 CAPSULE ORAL at 01:20

## 2022-07-03 ASSESSMENT — PAIN DESCRIPTION - PAIN TYPE
TYPE: SURGICAL PAIN

## 2022-07-03 ASSESSMENT — FIBROSIS 4 INDEX: FIB4 SCORE: 1.19

## 2022-07-03 NOTE — PROGRESS NOTES
Cardiovascular Surgery Progress Note    Name: Rony Chilel  MRN: [id number]  : 1955  Admit Date: 2022  5:53 AM  3 Days Post-Op     Procedure:  Procedure(s) and Anesthesia Type:     * CABG, WITH ENDOSCOPIC VEIN PROCUREMENT-X3 - General     * ECHOCARDIOGRAM, TRANSESOPHAGEAL, INTRAOPERATIVE - General    Vitals:  Vitals:    22 0731 22 0800 22 0900 22 1000   BP:  132/59     Pulse: 70 70     Resp: 18      Temp:  36.3 °C (97.4 °F)     TempSrc:  Temporal     SpO2: 94% 94% 95% 96%   Weight:       Height:          Temp (24hrs), Av.6 °C (97.8 °F), Min:35.9 °C (96.7 °F), Max:37 °C (98.6 °F)      Respiratory:    Respiration: 18, Pulse Oximetry: 96 %       Fluids:    Intake/Output Summary (Last 24 hours) at 7/3/2022 1203  Last data filed at 7/3/2022 1000  Gross per 24 hour   Intake 540 ml   Output 1450 ml   Net -910 ml     Admit weight: Weight: 77 kg (169 lb 12.1 oz)  Current weight: Weight: 85.6 kg (188 lb 11.4 oz) (22 0600)    Labs:  Recent Labs     22  0400 22  0332 22  0305   WBC 12.2* 12.8* 10.1   RBC 3.68* 3.35* 3.24*   HEMOGLOBIN 11.4* 10.4* 10.0*   HEMATOCRIT 32.3* 29.6* 29.3*   MCV 87.8 88.4 90.4   MCH 31.0 31.0 30.9   MCHC 35.3* 35.1* 34.1   RDW 46.9 47.2 46.9   PLATELETCT 173 153* 157*   MPV 9.7 9.4 9.9     Recent Labs     22  0400 22  0332 22  0305   SODIUM 136 123* 123*   POTASSIUM 4.4 3.7 4.5   CHLORIDE 104 90* 90*   CO2 20 24 25   GLUCOSE 156* 101* 134*   BUN 13 17 18   CREATININE 0.33* 0.48* 0.42*   CALCIUM 7.4* 8.2* 8.1*     Recent Labs     22  1221   APTT 77.0*   INR 1.57*         Medications:  Scheduled Medications   Medication Dose Frequency   • metOLazone  5 mg Q DAY   • guaiFENesin ER  600 mg Q12HRS   • furosemide  40 mg Q DAY   • potassium chloride SA  20 mEq DAILY   • insulin regular  2-9 Units 4X/DAY ACHS   • citalopram  20 mg Q EVENING   • Pharmacy Consult Request  1 Each PHARMACY TO DOSE   • acetaminophen  1,000  mg Q6HRS   • senna-docusate  2 Tablet BID    And   • polyethylene glycol/lytes  1 Packet DAILY    And   • magnesium hydroxide  30 mL DAILY   • omeprazole  20 mg DAILY   • metoprolol tartrate  25 mg BID   • aspirin EC  81 mg DAILY   • clopidogrel  75 mg DAILY   • atorvastatin  40 mg QHS        Exam:   Physical Exam  Vitals and nursing note reviewed.   Constitutional:       Appearance: She is well-developed.   HENT:      Head: Normocephalic.   Eyes:      Extraocular Movements: Extraocular movements intact.      Pupils: Pupils are equal, round, and reactive to light.   Cardiovascular:      Rate and Rhythm: Normal rate and regular rhythm.   Pulmonary:      Effort: Pulmonary effort is normal.   Abdominal:      General: Bowel sounds are normal.      Palpations: Abdomen is soft.   Musculoskeletal:         General: Normal range of motion.      Cervical back: Normal range of motion.   Skin:     General: Skin is warm and dry.   Neurological:      General: No focal deficit present.      Mental Status: She is alert and oriented to person, place, and time.   Psychiatric:         Mood and Affect: Mood normal.         Behavior: Behavior normal.       Cardiac Medications:    ASA - Yes    Plavix - Yes    Post-operative Beta Blockers - Yes    Ace/ARB- No; contraindicated because of Normal EF    Statin - Yes    Aldactone- No; contraindicated because of Normal EF    Ejection Fraction:  70%    Telemetry:   7/1 SR  7/2 SR r Pac  7/3 SR    Assessment/Plan:  POD 1  HDS, SR, neuro intact, wounds CDI, abdomen soft, fluid balance positive, wt up,  Chest tube output minimal and negative for air leak.  Plan:  Dc chest tubes and garza, IS/ambulate. CPM.    POD 2 HDS, SR.  Neuro intact.  Complaints of cough overnight.  Hematoma superior to incision, pt reports as larger.  Itchiness with oxycodone, changed to norco, tolerating better.  0.5LNC.  Plan: US mass above incision.  Tessalon pearls, mucinex for cough.  Wean oxygen as tolerated.  Start  diuresis/kdur.    POD 3 HDS SR.  Neuro intact.  Abdomen soft, +BM.  Hematoma has not grown.  0.5LNC, CXR today if not improved after walk.  Plan: Add metolazone for diuresis.  Wean oxygen.  Check CXR if not improved.  Monitor hematoma.      Disposition:  TBD

## 2022-07-03 NOTE — CARE PLAN
The patient is Stable - Low risk of patient condition declining or worsening    Shift Goals  Clinical Goals: Control pain, improve IS  Patient Goals: Rest  Family Goals: JASWINDER    Progress made toward(s) clinical / shift goals:  Give norco x1, ambulated in pm and am, voided x4, had 4 loose BM's as well     Patient is not progressing towards the following goals: IS <1000ml, voided into toilet in x3 so was unable to obtain accurate output      Problem: Knowledge Deficit - Standard  Goal: Patient and family/care givers will demonstrate understanding of plan of care, disease process/condition, diagnostic tests and medications  Outcome: Progressing     Problem: Pain - Standard  Goal: Alleviation of pain or a reduction in pain to the patient’s comfort goal  Outcome: Progressing

## 2022-07-04 LAB
ANION GAP SERPL CALC-SCNC: 9 MMOL/L (ref 7–16)
BUN SERPL-MCNC: 14 MG/DL (ref 8–22)
CALCIUM SERPL-MCNC: 8.3 MG/DL (ref 8.5–10.5)
CHLORIDE SERPL-SCNC: 86 MMOL/L (ref 96–112)
CO2 SERPL-SCNC: 28 MMOL/L (ref 20–33)
CREAT SERPL-MCNC: 0.41 MG/DL (ref 0.5–1.4)
ERYTHROCYTE [DISTWIDTH] IN BLOOD BY AUTOMATED COUNT: 44.7 FL (ref 35.9–50)
GFR SERPLBLD CREATININE-BSD FMLA CKD-EPI: 108 ML/MIN/1.73 M 2
GLUCOSE SERPL-MCNC: 111 MG/DL (ref 65–99)
HCT VFR BLD AUTO: 29 % (ref 37–47)
HGB BLD-MCNC: 10.2 G/DL (ref 12–16)
MCH RBC QN AUTO: 31.3 PG (ref 27–33)
MCHC RBC AUTO-ENTMCNC: 35.2 G/DL (ref 33.6–35)
MCV RBC AUTO: 89 FL (ref 81.4–97.8)
PLATELET # BLD AUTO: 166 K/UL (ref 164–446)
PMV BLD AUTO: 9.5 FL (ref 9–12.9)
POTASSIUM SERPL-SCNC: 3.7 MMOL/L (ref 3.6–5.5)
RBC # BLD AUTO: 3.26 M/UL (ref 4.2–5.4)
SODIUM SERPL-SCNC: 123 MMOL/L (ref 135–145)
WBC # BLD AUTO: 10 K/UL (ref 4.8–10.8)

## 2022-07-04 PROCEDURE — 700102 HCHG RX REV CODE 250 W/ 637 OVERRIDE(OP): Performed by: CLINICAL NURSE SPECIALIST

## 2022-07-04 PROCEDURE — 80048 BASIC METABOLIC PNL TOTAL CA: CPT

## 2022-07-04 PROCEDURE — 99024 POSTOP FOLLOW-UP VISIT: CPT | Performed by: NURSE PRACTITIONER

## 2022-07-04 PROCEDURE — A9270 NON-COVERED ITEM OR SERVICE: HCPCS | Performed by: CLINICAL NURSE SPECIALIST

## 2022-07-04 PROCEDURE — 700111 HCHG RX REV CODE 636 W/ 250 OVERRIDE (IP): Performed by: NURSE PRACTITIONER

## 2022-07-04 PROCEDURE — 94669 MECHANICAL CHEST WALL OSCILL: CPT

## 2022-07-04 PROCEDURE — A9270 NON-COVERED ITEM OR SERVICE: HCPCS | Performed by: NURSE PRACTITIONER

## 2022-07-04 PROCEDURE — 85027 COMPLETE CBC AUTOMATED: CPT

## 2022-07-04 PROCEDURE — 770020 HCHG ROOM/CARE - TELE (206)

## 2022-07-04 PROCEDURE — 700102 HCHG RX REV CODE 250 W/ 637 OVERRIDE(OP): Performed by: NURSE PRACTITIONER

## 2022-07-04 RX ADMIN — POTASSIUM CHLORIDE 20 MEQ: 1500 TABLET, EXTENDED RELEASE ORAL at 06:17

## 2022-07-04 RX ADMIN — ASPIRIN 81 MG: 81 TABLET, COATED ORAL at 06:17

## 2022-07-04 RX ADMIN — FUROSEMIDE 40 MG: 10 INJECTION, SOLUTION INTRAMUSCULAR; INTRAVENOUS at 06:16

## 2022-07-04 RX ADMIN — ACETAMINOPHEN 1000 MG: 500 TABLET, FILM COATED ORAL at 01:06

## 2022-07-04 RX ADMIN — METOLAZONE 5 MG: 5 TABLET ORAL at 06:17

## 2022-07-04 RX ADMIN — TRAMADOL HYDROCHLORIDE 50 MG: 50 TABLET, COATED ORAL at 01:06

## 2022-07-04 RX ADMIN — GUAIFENESIN 600 MG: 600 TABLET, EXTENDED RELEASE ORAL at 06:17

## 2022-07-04 RX ADMIN — OMEPRAZOLE 20 MG: 20 CAPSULE, DELAYED RELEASE ORAL at 06:16

## 2022-07-04 RX ADMIN — ALPRAZOLAM 0.25 MG: 0.25 TABLET ORAL at 21:06

## 2022-07-04 RX ADMIN — CLOPIDOGREL BISULFATE 75 MG: 75 TABLET ORAL at 06:17

## 2022-07-04 RX ADMIN — ACETAMINOPHEN 1000 MG: 500 TABLET, FILM COATED ORAL at 17:19

## 2022-07-04 RX ADMIN — ACETAMINOPHEN 1000 MG: 500 TABLET, FILM COATED ORAL at 06:17

## 2022-07-04 RX ADMIN — TRAMADOL HYDROCHLORIDE 50 MG: 50 TABLET, COATED ORAL at 15:59

## 2022-07-04 RX ADMIN — CITALOPRAM HYDROBROMIDE 20 MG: 20 TABLET ORAL at 17:19

## 2022-07-04 RX ADMIN — TRAMADOL HYDROCHLORIDE 50 MG: 50 TABLET, COATED ORAL at 07:58

## 2022-07-04 RX ADMIN — ACETAMINOPHEN 1000 MG: 500 TABLET, FILM COATED ORAL at 14:06

## 2022-07-04 RX ADMIN — GUAIFENESIN 600 MG: 600 TABLET, EXTENDED RELEASE ORAL at 17:18

## 2022-07-04 RX ADMIN — ATORVASTATIN CALCIUM 40 MG: 40 TABLET, FILM COATED ORAL at 21:06

## 2022-07-04 RX ADMIN — METOPROLOL TARTRATE 25 MG: 25 TABLET, FILM COATED ORAL at 17:18

## 2022-07-04 RX ADMIN — METOPROLOL TARTRATE 25 MG: 25 TABLET, FILM COATED ORAL at 06:00

## 2022-07-04 ASSESSMENT — PAIN DESCRIPTION - PAIN TYPE
TYPE: SURGICAL PAIN
TYPE: ACUTE PAIN
TYPE: ACUTE PAIN

## 2022-07-04 ASSESSMENT — FIBROSIS 4 INDEX: FIB4 SCORE: 1.12

## 2022-07-04 NOTE — PROGRESS NOTES
Cardiovascular Surgery Progress Note    Name: Rony Chilel  MRN: [id number]  : 1955  Admit Date: 2022  5:53 AM  4 Days Post-Op     Procedure:  Procedure(s) and Anesthesia Type:     * CABG, WITH ENDOSCOPIC VEIN PROCUREMENT-X3 - General     * ECHOCARDIOGRAM, TRANSESOPHAGEAL, INTRAOPERATIVE - General    Vitals:  Vitals:    22 0800 22 0830 22 0900 22 1134   BP: 131/60      Pulse:  67 72 73   Resp:  18  16   Temp: 36.2 °C (97.2 °F)      TempSrc: Temporal      SpO2: 93% 94% 92% 94%   Weight:       Height:          Temp (24hrs), Av.1 °C (96.9 °F), Min:35.8 °C (96.5 °F), Max:36.2 °C (97.2 °F)      Respiratory:    Respiration: 16, Pulse Oximetry: 94 %       Fluids:    Intake/Output Summary (Last 24 hours) at 2022 1300  Last data filed at 2022 1000  Gross per 24 hour   Intake 820 ml   Output 3350 ml   Net -2530 ml     Admit weight: Weight: 77 kg (169 lb 12.1 oz)  Current weight: Weight: 84.2 kg (185 lb 10 oz) (22 0400)    Labs:  Recent Labs     22  0332 22  0305 22  0520   WBC 12.8* 10.1 10.0   RBC 3.35* 3.24* 3.26*   HEMOGLOBIN 10.4* 10.0* 10.2*   HEMATOCRIT 29.6* 29.3* 29.0*   MCV 88.4 90.4 89.0   MCH 31.0 30.9 31.3   MCHC 35.1* 34.1 35.2*   RDW 47.2 46.9 44.7   PLATELETCT 153* 157* 166   MPV 9.4 9.9 9.5     Recent Labs     22  0332 22  0305 22  0520   SODIUM 123* 123* 123*   POTASSIUM 3.7 4.5 3.7   CHLORIDE 90* 90* 86*   CO2 24 25 28   GLUCOSE 101* 134* 111*   BUN 17 18 14   CREATININE 0.48* 0.42* 0.41*   CALCIUM 8.2* 8.1* 8.3*             Medications:  Scheduled Medications   Medication Dose Frequency   • metOLazone  5 mg Q DAY   • guaiFENesin ER  600 mg Q12HRS   • furosemide  40 mg Q DAY   • potassium chloride SA  20 mEq DAILY   • citalopram  20 mg Q EVENING   • Pharmacy Consult Request  1 Each PHARMACY TO DOSE   • acetaminophen  1,000 mg Q6HRS   • senna-docusate  2 Tablet BID    And   • polyethylene glycol/lytes  1 Packet DAILY     And   • magnesium hydroxide  30 mL DAILY   • omeprazole  20 mg DAILY   • metoprolol tartrate  25 mg BID   • aspirin EC  81 mg DAILY   • clopidogrel  75 mg DAILY   • atorvastatin  40 mg QHS        Exam:   Physical Exam  Vitals and nursing note reviewed.   Constitutional:       Appearance: She is well-developed.   HENT:      Head: Normocephalic.   Eyes:      Extraocular Movements: Extraocular movements intact.      Pupils: Pupils are equal, round, and reactive to light.   Cardiovascular:      Rate and Rhythm: Normal rate and regular rhythm.   Pulmonary:      Effort: Pulmonary effort is normal.   Abdominal:      General: Bowel sounds are normal.      Palpations: Abdomen is soft.   Musculoskeletal:         General: Normal range of motion.      Cervical back: Normal range of motion.   Skin:     General: Skin is warm and dry.   Neurological:      General: No focal deficit present.      Mental Status: She is alert and oriented to person, place, and time.   Psychiatric:         Mood and Affect: Mood normal.         Behavior: Behavior normal.       Cardiac Medications:    ASA - Yes    Plavix - Yes    Post-operative Beta Blockers - Yes    Ace/ARB- No; contraindicated because of Normal EF    Statin - Yes    Aldactone- No; contraindicated because of Normal EF    Ejection Fraction:  70%    Telemetry:   7/1 SR  7/2 SR r Pac  7/3 SR  7/4    Assessment/Plan:  POD 1  HDS, SR, neuro intact, wounds CDI, abdomen soft, fluid balance positive, wt up,  Chest tube output minimal and negative for air leak.  Plan:  Dc chest tubes and garza, IS/ambulate. CPM.    POD 2 HDS, SR.  Neuro intact.  Complaints of cough overnight.  Hematoma superior to incision, pt reports as larger.  Itchiness with oxycodone, changed to norco, tolerating better.  0.5LNC.  Plan: US mass above incision.  Tessalon pearls, mucinex for cough.  Wean oxygen as tolerated.  Start diuresis/kdur.    POD 3 HDS SR.  Neuro intact.  Abdomen soft, +BM.  Hematoma has not grown.   0.5LNC, CXR today if not improved after walk.  Plan: Add metolazone for diuresis.  Wean oxygen.  Check CXR if not improved.  Monitor hematoma.    POD 4 HDS SR.  Neuro intact.  Abdomen soft +BM.  0.5LNC.  Cr stable  Hgb improved.  Hyponatremia chronically low.  Plan: Continue diuresis.  Encourage IS/ambulation. Wean oxygen    Disposition:  TBD

## 2022-07-04 NOTE — CARE PLAN
The patient is Stable - Low risk of patient condition declining or worsening    Shift Goals  Clinical Goals: Pain control, ambulation, shower  Patient Goals: Improve IS  Family Goals: JASWINDER    Progress made toward(s) clinical / shift goals:  Pain assessed and medications given, ambulated, showered      Problem: Knowledge Deficit - Standard  Goal: Patient and family/care givers will demonstrate understanding of plan of care, disease process/condition, diagnostic tests and medications  Outcome: Progressing  Note: Educated on interventions and goals       Problem: Post Op Day 3 CABG/Heart Valve replacement  Goal: Optimal care of the post op CABG/Heart Valve replacement post op day 3  Outcome: Progressing  Intervention: Daily weights in the morning  Note: 85.6kg    Intervention: Shower daily and clean incisions twice daily with soap and water  Note: Shower completed, incision care done  Intervention: Up in chair for all meals  Note: Up in chair for all meals  Intervention: Ambulate 4 times daily, increasing the distance each time  Note: Ambulated x3 so far, night shift will complete last walk. Best distance 250  Intervention: IS q 1 hour while awake and record best IS volume  Note: Best   Intervention: Consider removal of garza, chest tube and pacer wires if not already done  Note: Pacer wires removed by APRN       Patient is not progressing towards the following goals:

## 2022-07-04 NOTE — CARE PLAN
Problem: Post Op Day 4 CABG/Heart Valve Replacement  Goal: Optimal care of the Post Op CABG/Heart Valve replacement Post Op Day 4  Outcome: Progressing     The patient is Stable - Low risk of patient condition declining or worsening

## 2022-07-05 LAB
ANION GAP SERPL CALC-SCNC: 8 MMOL/L (ref 7–16)
BUN SERPL-MCNC: 14 MG/DL (ref 8–22)
CALCIUM SERPL-MCNC: 8.8 MG/DL (ref 8.5–10.5)
CHLORIDE SERPL-SCNC: 83 MMOL/L (ref 96–112)
CO2 SERPL-SCNC: 30 MMOL/L (ref 20–33)
CREAT SERPL-MCNC: 0.45 MG/DL (ref 0.5–1.4)
ERYTHROCYTE [DISTWIDTH] IN BLOOD BY AUTOMATED COUNT: 43.2 FL (ref 35.9–50)
GFR SERPLBLD CREATININE-BSD FMLA CKD-EPI: 106 ML/MIN/1.73 M 2
GLUCOSE SERPL-MCNC: 106 MG/DL (ref 65–99)
HCT VFR BLD AUTO: 30.2 % (ref 37–47)
HGB BLD-MCNC: 10.5 G/DL (ref 12–16)
MCH RBC QN AUTO: 31 PG (ref 27–33)
MCHC RBC AUTO-ENTMCNC: 34.8 G/DL (ref 33.6–35)
MCV RBC AUTO: 89.1 FL (ref 81.4–97.8)
PLATELET # BLD AUTO: 210 K/UL (ref 164–446)
PMV BLD AUTO: 9.5 FL (ref 9–12.9)
POTASSIUM SERPL-SCNC: 3.5 MMOL/L (ref 3.6–5.5)
RBC # BLD AUTO: 3.39 M/UL (ref 4.2–5.4)
SODIUM SERPL-SCNC: 121 MMOL/L (ref 135–145)
WBC # BLD AUTO: 8.6 K/UL (ref 4.8–10.8)

## 2022-07-05 PROCEDURE — 97530 THERAPEUTIC ACTIVITIES: CPT

## 2022-07-05 PROCEDURE — A9270 NON-COVERED ITEM OR SERVICE: HCPCS | Performed by: NURSE PRACTITIONER

## 2022-07-05 PROCEDURE — 700102 HCHG RX REV CODE 250 W/ 637 OVERRIDE(OP): Performed by: NURSE PRACTITIONER

## 2022-07-05 PROCEDURE — 700111 HCHG RX REV CODE 636 W/ 250 OVERRIDE (IP): Performed by: NURSE PRACTITIONER

## 2022-07-05 PROCEDURE — A9270 NON-COVERED ITEM OR SERVICE: HCPCS | Performed by: CLINICAL NURSE SPECIALIST

## 2022-07-05 PROCEDURE — 770020 HCHG ROOM/CARE - TELE (206)

## 2022-07-05 PROCEDURE — 94669 MECHANICAL CHEST WALL OSCILL: CPT

## 2022-07-05 PROCEDURE — 97116 GAIT TRAINING THERAPY: CPT

## 2022-07-05 PROCEDURE — 700111 HCHG RX REV CODE 636 W/ 250 OVERRIDE (IP): Performed by: CLINICAL NURSE SPECIALIST

## 2022-07-05 PROCEDURE — 80048 BASIC METABOLIC PNL TOTAL CA: CPT

## 2022-07-05 PROCEDURE — 700102 HCHG RX REV CODE 250 W/ 637 OVERRIDE(OP): Performed by: CLINICAL NURSE SPECIALIST

## 2022-07-05 PROCEDURE — 85027 COMPLETE CBC AUTOMATED: CPT

## 2022-07-05 RX ORDER — LOSARTAN POTASSIUM 50 MG/1
50 TABLET ORAL
Status: DISCONTINUED | OUTPATIENT
Start: 2022-07-05 | End: 2022-07-06 | Stop reason: HOSPADM

## 2022-07-05 RX ORDER — POTASSIUM CHLORIDE 20 MEQ/1
40 TABLET, EXTENDED RELEASE ORAL ONCE
Status: COMPLETED | OUTPATIENT
Start: 2022-07-05 | End: 2022-07-05

## 2022-07-05 RX ADMIN — GUAIFENESIN 600 MG: 600 TABLET, EXTENDED RELEASE ORAL at 18:00

## 2022-07-05 RX ADMIN — ALPRAZOLAM 0.25 MG: 0.25 TABLET ORAL at 20:14

## 2022-07-05 RX ADMIN — CLOPIDOGREL BISULFATE 75 MG: 75 TABLET ORAL at 05:34

## 2022-07-05 RX ADMIN — ONDANSETRON 8 MG: 2 INJECTION INTRAMUSCULAR; INTRAVENOUS at 04:37

## 2022-07-05 RX ADMIN — GUAIFENESIN 600 MG: 600 TABLET, EXTENDED RELEASE ORAL at 05:33

## 2022-07-05 RX ADMIN — ACETAMINOPHEN 1000 MG: 500 TABLET, FILM COATED ORAL at 11:42

## 2022-07-05 RX ADMIN — LOSARTAN POTASSIUM 50 MG: 50 TABLET, FILM COATED ORAL at 11:39

## 2022-07-05 RX ADMIN — ACETAMINOPHEN 1000 MG: 500 TABLET, FILM COATED ORAL at 00:07

## 2022-07-05 RX ADMIN — CITALOPRAM HYDROBROMIDE 20 MG: 20 TABLET ORAL at 18:00

## 2022-07-05 RX ADMIN — TRAMADOL HYDROCHLORIDE 50 MG: 50 TABLET, COATED ORAL at 16:25

## 2022-07-05 RX ADMIN — POTASSIUM CHLORIDE 20 MEQ: 1500 TABLET, EXTENDED RELEASE ORAL at 05:34

## 2022-07-05 RX ADMIN — TRAMADOL HYDROCHLORIDE 50 MG: 50 TABLET, COATED ORAL at 04:07

## 2022-07-05 RX ADMIN — METOPROLOL TARTRATE 25 MG: 25 TABLET, FILM COATED ORAL at 06:48

## 2022-07-05 RX ADMIN — FUROSEMIDE 40 MG: 10 INJECTION, SOLUTION INTRAMUSCULAR; INTRAVENOUS at 05:33

## 2022-07-05 RX ADMIN — ACETAMINOPHEN 1000 MG: 500 TABLET, FILM COATED ORAL at 05:33

## 2022-07-05 RX ADMIN — METOPROLOL TARTRATE 25 MG: 25 TABLET, FILM COATED ORAL at 18:00

## 2022-07-05 RX ADMIN — ASPIRIN 81 MG: 81 TABLET, COATED ORAL at 05:33

## 2022-07-05 RX ADMIN — POTASSIUM CHLORIDE 40 MEQ: 1500 TABLET, EXTENDED RELEASE ORAL at 11:42

## 2022-07-05 RX ADMIN — ATORVASTATIN CALCIUM 40 MG: 40 TABLET, FILM COATED ORAL at 20:14

## 2022-07-05 RX ADMIN — HYDROCODONE BITARTRATE AND ACETAMINOPHEN 1 TABLET: 5; 325 TABLET ORAL at 22:40

## 2022-07-05 RX ADMIN — METOLAZONE 5 MG: 5 TABLET ORAL at 05:33

## 2022-07-05 RX ADMIN — OMEPRAZOLE 20 MG: 20 CAPSULE, DELAYED RELEASE ORAL at 05:39

## 2022-07-05 ASSESSMENT — FIBROSIS 4 INDEX: FIB4 SCORE: 0.89

## 2022-07-05 ASSESSMENT — LIFESTYLE VARIABLES
TOTAL SCORE: 0
HOW MANY TIMES IN THE PAST YEAR HAVE YOU HAD 5 OR MORE DRINKS IN A DAY: 0
EVER HAD A DRINK FIRST THING IN THE MORNING TO STEADY YOUR NERVES TO GET RID OF A HANGOVER: NO
TOTAL SCORE: 0
CONSUMPTION TOTAL: NEGATIVE
HAVE YOU EVER FELT YOU SHOULD CUT DOWN ON YOUR DRINKING: NO
ALCOHOL_USE: YES
HAVE PEOPLE ANNOYED YOU BY CRITICIZING YOUR DRINKING: NO
EVER FELT BAD OR GUILTY ABOUT YOUR DRINKING: NO
AVERAGE NUMBER OF DAYS PER WEEK YOU HAVE A DRINK CONTAINING ALCOHOL: 0
ON A TYPICAL DAY WHEN YOU DRINK ALCOHOL HOW MANY DRINKS DO YOU HAVE: 1
DOES PATIENT WANT TO STOP DRINKING: NO
TOTAL SCORE: 0

## 2022-07-05 ASSESSMENT — GAIT ASSESSMENTS
ASSISTIVE DEVICE: FRONT WHEEL WALKER
GAIT LEVEL OF ASSIST: SUPERVISED
DISTANCE (FEET): 200
DEVIATION: BRADYKINETIC

## 2022-07-05 ASSESSMENT — COGNITIVE AND FUNCTIONAL STATUS - GENERAL
MOBILITY SCORE: 18
MOVING TO AND FROM BED TO CHAIR: A LITTLE
SUGGESTED CMS G CODE MODIFIER DAILY ACTIVITY: CH
CLIMB 3 TO 5 STEPS WITH RAILING: A LITTLE
DAILY ACTIVITIY SCORE: 24
STANDING UP FROM CHAIR USING ARMS: A LITTLE
WALKING IN HOSPITAL ROOM: A LITTLE
TURNING FROM BACK TO SIDE WHILE IN FLAT BAD: A LITTLE
SUGGESTED CMS G CODE MODIFIER MOBILITY: CK
MOVING FROM LYING ON BACK TO SITTING ON SIDE OF FLAT BED: A LITTLE

## 2022-07-05 ASSESSMENT — PAIN DESCRIPTION - PAIN TYPE
TYPE: SURGICAL PAIN

## 2022-07-05 ASSESSMENT — PATIENT HEALTH QUESTIONNAIRE - PHQ9
SUM OF ALL RESPONSES TO PHQ9 QUESTIONS 1 AND 2: 0
2. FEELING DOWN, DEPRESSED, IRRITABLE, OR HOPELESS: NOT AT ALL
1. LITTLE INTEREST OR PLEASURE IN DOING THINGS: NOT AT ALL

## 2022-07-05 NOTE — CARE PLAN
The patient is Stable - Low risk of patient condition declining or worsening    Shift Goals  Clinical Goals: Continue post-op care plan, meet goals, off of oxygen  Patient Goals: Improve IS  Family Goals: JASWINDER    Progress made toward(s) clinical / shift goals:  Post-op goals achieved, attempted trial off of oxygen    Patient is not progressing towards the following goals:

## 2022-07-05 NOTE — CARE PLAN
The patient is Stable - Low risk of patient condition declining or worsening    Shift Goals  Clinical Goals: Continue post-op care plan, meet goals, off of oxygen  Patient Goals: Improve IS  Family Goals: JASWINDER    Progress made toward(s) clinical / shift goals:  Post-op goals achieved, attempted trial off of oxygen    Problem: Knowledge Deficit - Standard  Goal: Patient and family/care givers will demonstrate understanding of plan of care, disease process/condition, diagnostic tests and medications  Outcome: Progressing  Note: Answered questions regarding plan of care     Problem: Post Op Day 4 CABG/Heart Valve Replacement  Goal: Optimal care of the Post Op CABG/Heart Valve replacement Post Op Day 4  Outcome: Progressing  Intervention: Shower daily and clean incisions twice daily with soap and water  Note: Patient showered and incision care completed  Intervention: Up in chair for all meals  Note: Patient in chair for all meals   Intervention: Ambulate 4 times daily, increasing the distance each time  Note: Patient ambulated x4 for day, best distance 75% of the unit  Intervention: IS q 1 hour while awake and record best IS volume  Note: Best IS 1000  Intervention: Consider removal of garza, chest tube and pacer wires if not already done  Note: All removed already       Patient is not progressing towards the following goals:

## 2022-07-05 NOTE — THERAPY
"Physical Therapy   Daily Treatment     Patient Name: Rony Chilel  Age:  67 y.o., Sex:  female  Medical Record #: [id number]  Today's Date: 7/5/2022     Precautions  Precautions: Fall Risk;Cardiac Precautions (See Comments);Sternal Precautions (See Comments)    Assessment    Pt seen for PT treatment session with emphasis on stair training in anticipation of DC home. Pt demonstrated slow but overall steady gait with FWW. Able to negotiate 5 steps with CGA and R railing support. Discussed how to manage FWW up/down stairs when negotiating alone or when having assist from friend. Pt continues to become fatigued with light activity, but was able to demonstrate understanding of the \"talk test\" throughout session. Pt appears functionally capable of DC home when medically cleared. PT will continue to follow while in house and optimize functional independence and address any questions prior to DC home.     Plan    Continue current treatment plan.    DC Equipment Recommendations: Front-Wheel Walker  Discharge Recommendations: Recommend home health for continued physical therapy services       07/05/22 1016   Precautions   Precautions Fall Risk;Cardiac Precautions (See Comments);Sternal Precautions (See Comments)   Vitals   O2 Delivery Device None - Room Air   Vitals Comments SpO2 monitored throughout ambulation with pt able to maintain 90-94% while on RA   Pain 0 - 10 Group   Therapist Pain Assessment Nurse Notified  (pain not rated, mostly during positional changes)   Cognition    Cognition / Consciousness WDL   Level of Consciousness Alert   Comments receptive to PT education   Balance   Sitting Balance (Static) Good   Sitting Balance (Dynamic) Good   Standing Balance (Static) Fair +   Standing Balance (Dynamic) Fair   Weight Shift Sitting Good   Weight Shift Standing Fair   Skilled Intervention Compensatory Strategies;Verbal Cuing   Comments w/ FWW   Gait Analysis   Gait Level Of Assist Supervised   Assistive Device " "Front Wheel Walker   Distance (Feet) 200   # of Times Distance was Traveled 1   Deviation Bradykinetic   # of Stairs Climbed 5  (with R railing and folded FWW)   Level of Assist with Stairs Contact Guard Assist   Weight Bearing Status no restrictions   Skilled Intervention Verbal Cuing;Sequencing   Comments reiterated \"Talk Test\" throughout ambulation. Good safety and sequencing on stairs   Bed Mobility    Supine to Sit Supervised   Sit to Supine Supervised   Scooting Supervised   Skilled Intervention Verbal Cuing   Comments HOB slightly elevated at 20-30*, plans to sleep in recliner upon DC home   Functional Mobility   Sit to Stand Supervised   Bed, Chair, Wheelchair Transfer Supervised   Transfer Method Stand Step   Skilled Intervention Verbal Cuing   ICU Target Mobility Level   ICU Mobility - Targeted Level Level 4   How much difficulty does the patient currently have...   Turning over in bed (including adjusting bedclothes, sheets and blankets)? 3   Sitting down on and standing up from a chair with arms (e.g., wheelchair, bedside commode, etc.) 3   Moving from lying on back to sitting on the side of the bed? 3   How much help from another person does the patient currently need...   Moving to and from a bed to a chair (including a wheelchair)? 3   Need to walk in a hospital room? 3   Climbing 3-5 steps with a railing? 3   6 clicks Mobility Score 18   Patient / Family Goals    Patient / Family Goal #1 to go home   Goal #1 Outcome Goal not met   Short Term Goals    Short Term Goal # 1 in 6 visits patient will demo all bed mobility via log roll for safe DC   Goal Outcome # 1 Progressing as expected   Short Term Goal # 2 in 6 visits patient will ambulate 400' supervision w/LRAD for safe DC   Goal Outcome # 2 Progressing as expected   Short Term Goal # 3 in 6 visits patient will navigate 5 stairs with Amy and LRAD for safe DC   Goal Outcome # 3 Goal met   Education Group   Additional Comments at request of " Pt,discussed donning pants while maintaining sternal precautions   Anticipated Discharge Equipment and Recommendations   DC Equipment Recommendations Front-Wheel Walker   Discharge Recommendations Recommend home health for continued physical therapy services

## 2022-07-05 NOTE — THERAPY
Occupational Therapy  Daily Treatment     Patient Name: Rony Chilel  Age:  67 y.o., Sex:  female  Medical Record #: [id number]  Today's Date: 7/5/2022     Precautions  Precautions: Fall Risk, Cardiac Precautions (See Comments), Sternal Precautions (See Comments)    Assessment    Pt is at or near his/her functional baseline. Pt with no further skilled OT needs in the acute care setting at this time.       Plan    Continue current treatment plan.    DC Equipment Recommendations: Unable to determine at this time  Discharge Recommendations: (P) Recommend home health for continued occupational therapy services         07/05/22 1137   Activities of Daily Living   Grooming Supervision   Upper Body Dressing Supervision   Lower Body Dressing Supervision   Functional Mobility   Sit to Stand Supervised   Bed, Chair, Wheelchair Transfer Supervised   Short Term Goals   Short Term Goal # 1 S morning ADL routine   Goal Outcome # 1 Goal met   Short Term Goal # 2 S LB ADLs   Goal Outcome # 2 Goal met   Short Term Goal # 3 S toileting   Goal Outcome # 3 Goal met   Short Term Goal # 4 S toilet transfer with LRAD   Goal Outcome # 4 Goal met   Anticipated Discharge Equipment and Recommendations   Discharge Recommendations Recommend home health for continued occupational therapy services

## 2022-07-05 NOTE — PROGRESS NOTES
Cardiovascular Surgery Progress Note    Name: Rony Chilel  MRN: [id number]  : 1955  Admit Date: 2022  5:53 AM  5 Days Post-Op     Procedure:  Procedure(s) and Anesthesia Type:     * CABG, WITH ENDOSCOPIC VEIN PROCUREMENT-X3 - General     * ECHOCARDIOGRAM, TRANSESOPHAGEAL, INTRAOPERATIVE - General    Vitals:  Vitals:    22 0636 22 0700 22 0800 22 0900   BP:   (!) 154/70    Pulse: 80 81 80 80   Resp: 18      Temp:   36.1 °C (96.9 °F)    TempSrc:   Temporal    SpO2: 90% 91% 93%    Weight:       Height:          Temp (24hrs), Av.2 °C (97.2 °F), Min:36 °C (96.8 °F), Max:36.5 °C (97.7 °F)      Respiratory:    Respiration: 18, Pulse Oximetry: 93 %       Fluids:    Intake/Output Summary (Last 24 hours) at 2022 1059  Last data filed at 2022 1000  Gross per 24 hour   Intake 1140 ml   Output 2700 ml   Net -1560 ml     Admit weight: Weight: 77 kg (169 lb 12.1 oz)  Current weight: Weight: 82.3 kg (181 lb 7 oz) (22 0407)    Labs:  Recent Labs     22  03022  0520 22  0012   WBC 10.1 10.0 8.6   RBC 3.24* 3.26* 3.39*   HEMOGLOBIN 10.0* 10.2* 10.5*   HEMATOCRIT 29.3* 29.0* 30.2*   MCV 90.4 89.0 89.1   MCH 30.9 31.3 31.0   MCHC 34.1 35.2* 34.8   RDW 46.9 44.7 43.2   PLATELETCT 157* 166 210   MPV 9.9 9.5 9.5     Recent Labs     22  03022  0520 22  0012   SODIUM 123* 123* 121*   POTASSIUM 4.5 3.7 3.5*   CHLORIDE 90* 86* 83*   CO2 25 28 30   GLUCOSE 134* 111* 106*   BUN 18 14 14   CREATININE 0.42* 0.41* 0.45*   CALCIUM 8.1* 8.3* 8.8             Medications:  Scheduled Medications   Medication Dose Frequency   • potassium chloride SA  40 mEq Once   • ticagrelor  90 mg BID   • metOLazone  5 mg Q DAY   • guaiFENesin ER  600 mg Q12HRS   • potassium chloride SA  20 mEq DAILY   • citalopram  20 mg Q EVENING   • Pharmacy Consult Request  1 Each PHARMACY TO DOSE   • acetaminophen  1,000 mg Q6HRS   • senna-docusate  2 Tablet BID    And   •  polyethylene glycol/lytes  1 Packet DAILY    And   • magnesium hydroxide  30 mL DAILY   • omeprazole  20 mg DAILY   • metoprolol tartrate  25 mg BID   • aspirin EC  81 mg DAILY   • atorvastatin  40 mg QHS        Exam:   Physical Exam  Vitals and nursing note reviewed.   Constitutional:       Appearance: She is well-developed.   HENT:      Head: Normocephalic.   Eyes:      Extraocular Movements: Extraocular movements intact.      Pupils: Pupils are equal, round, and reactive to light.   Cardiovascular:      Rate and Rhythm: Normal rate and regular rhythm.   Pulmonary:      Effort: Pulmonary effort is normal.      Breath sounds: Examination of the right-lower field reveals decreased breath sounds. Examination of the left-lower field reveals decreased breath sounds. Decreased breath sounds present.   Abdominal:      General: Bowel sounds are normal.      Palpations: Abdomen is soft.   Musculoskeletal:         General: Normal range of motion.      Cervical back: Normal range of motion.   Skin:     General: Skin is warm and dry.      Comments: Sternum- c/d/i   Neurological:      General: No focal deficit present.      Mental Status: She is alert and oriented to person, place, and time.   Psychiatric:         Mood and Affect: Mood normal.         Behavior: Behavior normal.       Cardiac Medications:    ASA - Yes    Plavix - Yes    Post-operative Beta Blockers - Yes    Ace/ARB- No; contraindicated because of Normal EF    Statin - Yes    Aldactone- No; contraindicated because of Normal EF    Ejection Fraction:  70%    Telemetry:   7/1 SR  7/2 SR r Pac  7/3 SR  7/4  7/5 SR    Assessment/Plan:  POD 1  HDS, SR, neuro intact, wounds CDI, abdomen soft, fluid balance positive, wt up,  Chest tube output minimal and negative for air leak.  Plan:  Dc chest tubes and garza, IS/ambulate. CPM.    POD 2 HDS, SR.  Neuro intact.  Complaints of cough overnight.  Hematoma superior to incision, pt reports as larger.  Itchiness with  oxycodone, changed to norco, tolerating better.  0.5LNC.  Plan: US mass above incision.  Tessalon pearls, mucinex for cough.  Wean oxygen as tolerated.  Start diuresis/kdur.    POD 3 HDS SR.  Neuro intact.  Abdomen soft, +BM.  Hematoma has not grown.  0.5LNC, CXR today if not improved after walk.  Plan: Add metolazone for diuresis.  Wean oxygen.  Check CXR if not improved.  Monitor hematoma.    POD 4 HDS SR.  Neuro intact.  Abdomen soft +BM.  0.5LNC.  Cr stable  Hgb improved.  Hyponatremia chronically low.  Plan: Continue diuresis.  Encourage IS/ambulation. Wean oxygen    POD 5 HTN- add ARB, SR, diuresed well- d/c lasix, hyponatremia- watch, wean O2, planning home tomorrow with home health and walker    Disposition:  7/5 ordered HH and walker

## 2022-07-05 NOTE — FACE TO FACE
Face to Face Note  -  Durable Medical Equipment    FLAKITA Harrison. - NPI: [id number]  I certify that this patient is under my care and that they have had a durable medical equipment(DME)face to face encounter by myself that meets the physician DME face-to-face encounter requirements with this patient on:    Date of encounter:   Patient:                    MRN:                       YOB: 2021  Rony Chilel  [id number]  1955     The encounter with the patient was in whole, or in part, for the following medical condition, which is the primary reason for durable medical equipment:  Post-Op Surgery    I certify that, based on my findings, the following durable medical equipment is medically necessary:  Walkers.    HOME O2 Saturation Measurements:(Values must be present for Home Oxygen orders)         ,     ,         My Clinical findings support the need for the above equipment due to:  Abnormal Gait    Supporting Symptoms: Abnormal gait     ------------------------------------------------------------------------------------------------------------------    Face to Face Supporting Documentation - Home Health    The encounter with this patient was in whole or in part the primary reason for home health admission.    Date of encounter:   Patient:                    MRN:                       YOB: 2021  Rony Chilel  [id number]  1955     Home health to see patient for:  Skilled Nursing care for assessment, interventions & education and Wound Care    Skilled need for:  Surgical Aftercare Wound care, vital signs    Skilled nursing interventions to include:  Wound Care    Homebound evidenced status by:  Needs the assistance of another person in order to leave the home. Leaving home must require a considerable and taxing effort. There must exist a normal inability to leave the home.    Community Physician to provide follow up care: No primary care provider on  file.     Optional Interventions    Wound information & treatment:    Home Infusion Therapy orders:    Line/Drain/Airway:    I certify the face to face encounter for this home care referral meets the CMS requirements and the encounter/clinical assessment with the patient was, in whole, or in part, for the medical condition(s) listed above, which is the primary reason for home health care. Based on my clinical findings: the service(s) are medically necessary, support the need for home health care, and the homebound criteria are met.  I certify that this patient has had a face to face encounter by myself.  BRAYAN HarrisonN. - NPI: [id number]    *Debility, frailty and advanced age in the absence of an acute deterioration or exacerbation of a condition do not qualify a patient for home health.

## 2022-07-05 NOTE — CARE PLAN
Problem: Post Op Day 5 CABG/Heart Valve Replacement  Goal: Optimal care of the Post Op CABG/Heart Valve replacement Post Op Day 5  Intervention: IS q 1 hour while awake and record best IS volume  Note: 1500

## 2022-07-06 VITALS
DIASTOLIC BLOOD PRESSURE: 79 MMHG | BODY MASS INDEX: 29.64 KG/M2 | TEMPERATURE: 97.8 F | HEART RATE: 76 BPM | RESPIRATION RATE: 18 BRPM | HEIGHT: 65 IN | SYSTOLIC BLOOD PRESSURE: 154 MMHG | WEIGHT: 177.91 LBS | OXYGEN SATURATION: 94 %

## 2022-07-06 LAB
ANION GAP SERPL CALC-SCNC: 11 MMOL/L (ref 7–16)
BUN SERPL-MCNC: 13 MG/DL (ref 8–22)
CALCIUM SERPL-MCNC: 8.7 MG/DL (ref 8.5–10.5)
CHLORIDE SERPL-SCNC: 82 MMOL/L (ref 96–112)
CO2 SERPL-SCNC: 29 MMOL/L (ref 20–33)
CREAT SERPL-MCNC: 0.45 MG/DL (ref 0.5–1.4)
ERYTHROCYTE [DISTWIDTH] IN BLOOD BY AUTOMATED COUNT: 41.8 FL (ref 35.9–50)
GFR SERPLBLD CREATININE-BSD FMLA CKD-EPI: 106 ML/MIN/1.73 M 2
GLUCOSE SERPL-MCNC: 129 MG/DL (ref 65–99)
HCT VFR BLD AUTO: 31.6 % (ref 37–47)
HGB BLD-MCNC: 11.4 G/DL (ref 12–16)
MCH RBC QN AUTO: 31.2 PG (ref 27–33)
MCHC RBC AUTO-ENTMCNC: 36.1 G/DL (ref 33.6–35)
MCV RBC AUTO: 86.6 FL (ref 81.4–97.8)
PLATELET # BLD AUTO: 274 K/UL (ref 164–446)
PMV BLD AUTO: 9.1 FL (ref 9–12.9)
POTASSIUM SERPL-SCNC: 4 MMOL/L (ref 3.6–5.5)
RBC # BLD AUTO: 3.65 M/UL (ref 4.2–5.4)
SODIUM SERPL-SCNC: 122 MMOL/L (ref 135–145)
WBC # BLD AUTO: 8.4 K/UL (ref 4.8–10.8)

## 2022-07-06 PROCEDURE — A9270 NON-COVERED ITEM OR SERVICE: HCPCS | Performed by: NURSE PRACTITIONER

## 2022-07-06 PROCEDURE — 85027 COMPLETE CBC AUTOMATED: CPT

## 2022-07-06 PROCEDURE — 700102 HCHG RX REV CODE 250 W/ 637 OVERRIDE(OP): Performed by: NURSE PRACTITIONER

## 2022-07-06 PROCEDURE — 99024 POSTOP FOLLOW-UP VISIT: CPT | Performed by: NURSE PRACTITIONER

## 2022-07-06 PROCEDURE — 80048 BASIC METABOLIC PNL TOTAL CA: CPT

## 2022-07-06 PROCEDURE — 700102 HCHG RX REV CODE 250 W/ 637 OVERRIDE(OP): Performed by: CLINICAL NURSE SPECIALIST

## 2022-07-06 PROCEDURE — A9270 NON-COVERED ITEM OR SERVICE: HCPCS | Performed by: CLINICAL NURSE SPECIALIST

## 2022-07-06 PROCEDURE — 94669 MECHANICAL CHEST WALL OSCILL: CPT

## 2022-07-06 RX ORDER — LOSARTAN POTASSIUM 50 MG/1
50 TABLET ORAL DAILY
Qty: 30 TABLET | Refills: 2 | Status: SHIPPED | OUTPATIENT
Start: 2022-07-07 | End: 2023-11-22

## 2022-07-06 RX ORDER — ALPRAZOLAM 0.25 MG/1
0.25 TABLET ORAL 3 TIMES DAILY PRN
Qty: 15 TABLET | Refills: 0 | Status: SHIPPED | OUTPATIENT
Start: 2022-07-06 | End: 2022-07-11

## 2022-07-06 RX ORDER — TRAMADOL HYDROCHLORIDE 50 MG/1
50 TABLET ORAL EVERY 8 HOURS PRN
Qty: 42 TABLET | Refills: 0 | Status: SHIPPED | OUTPATIENT
Start: 2022-07-06 | End: 2022-07-20

## 2022-07-06 RX ORDER — POTASSIUM CHLORIDE 750 MG/1
10 TABLET, EXTENDED RELEASE ORAL DAILY
Qty: 30 TABLET | Refills: 2 | Status: SHIPPED | OUTPATIENT
Start: 2022-07-07 | End: 2022-11-02

## 2022-07-06 RX ORDER — FUROSEMIDE 20 MG/1
20 TABLET ORAL DAILY
Qty: 30 TABLET | Refills: 2 | Status: SHIPPED | OUTPATIENT
Start: 2022-07-06 | End: 2022-11-02

## 2022-07-06 RX ADMIN — METOPROLOL TARTRATE 25 MG: 25 TABLET, FILM COATED ORAL at 05:31

## 2022-07-06 RX ADMIN — GUAIFENESIN 600 MG: 600 TABLET, EXTENDED RELEASE ORAL at 05:30

## 2022-07-06 RX ADMIN — TICAGRELOR 90 MG: 90 TABLET ORAL at 05:31

## 2022-07-06 RX ADMIN — HYDROCODONE BITARTRATE AND ACETAMINOPHEN 1 TABLET: 5; 325 TABLET ORAL at 14:48

## 2022-07-06 RX ADMIN — POTASSIUM CHLORIDE 20 MEQ: 1500 TABLET, EXTENDED RELEASE ORAL at 05:31

## 2022-07-06 RX ADMIN — ASPIRIN 81 MG: 81 TABLET, COATED ORAL at 05:30

## 2022-07-06 RX ADMIN — ALPRAZOLAM 0.25 MG: 0.25 TABLET ORAL at 12:09

## 2022-07-06 RX ADMIN — METOLAZONE 5 MG: 5 TABLET ORAL at 05:30

## 2022-07-06 RX ADMIN — OMEPRAZOLE 20 MG: 20 CAPSULE, DELAYED RELEASE ORAL at 05:31

## 2022-07-06 RX ADMIN — LOSARTAN POTASSIUM 50 MG: 50 TABLET, FILM COATED ORAL at 05:31

## 2022-07-06 RX ADMIN — TRAMADOL HYDROCHLORIDE 50 MG: 50 TABLET, COATED ORAL at 01:35

## 2022-07-06 ASSESSMENT — FIBROSIS 4 INDEX: FIB4 SCORE: 0.68

## 2022-07-06 NOTE — CARE PLAN
The patient is Stable - Low risk of patient condition declining or worsening    Shift Goals  Clinical Goals: Continue post-op care plan, meet goals, off of oxygen  Patient Goals: Improve IS  Family Goals: JASWINDER    Progress made toward(s) clinical / shift goals:  Post-op goals completed, off oxygen    Problem: Post Op Day 5 CABG/Heart Valve Replacement  Goal: Optimal care of the Post Op CABG/Heart Valve replacement Post Op Day 5  Outcome: Progressing  Intervention: Shower daily and clean incisions twice daily with soap and water  Note: Showered and incision care completed  Intervention: Up in chair for all meals  Note: Up in chair for meals  Intervention: Ambulate 4 times daily, increasing the distance each time  Note: Ambulate x3, noc shift to do last one  Intervention: IS q 1 hour while awake and record best IS volume  Note: 1250 best       Problem: Pain - Standard  Goal: Alleviation of pain or a reduction in pain to the patient’s comfort goal  Outcome: Progressing  Note: Assessed and interventions given       Patient is not progressing towards the following goals:

## 2022-07-06 NOTE — DISCHARGE PLANNING
Case Management Discharge Planning    Admission Date: 6/30/2022  GMLOS: 6  ALOS: 6    6-Clicks ADL Score: 24  6-Clicks Mobility Score: 18    Anticipated Discharge Dispo:  Discharge home to self care with home health services    Action(s) Taken: Choice obtained and Referral(s) sent     Met with patient at bedside to discuss discharge planning. Patient desires to discharge home with home health. Choice form signed, and patient selected [facility]. Referral sent. Contacted [facility] ([phone]), and spoke with Bronwyn, who stated that [facility] does accept patient's insurance, but that they are still working on it. She stated she will call this RN CM when completed, bu there should not be any issues.     Patient has transportation, and will discharge home this afternoon.     Medically Clear: Yes    Next Steps: Follow-up with [facility] to confirm acceptance    Barriers to Discharge: Outpatient referrals pending

## 2022-07-06 NOTE — PROGRESS NOTES
Patient discharged home. All IVs removed. Patient belongings including cell phone, discharge paperwork, DME front wheel walker, glasses taken home by patient and friend.

## 2022-07-06 NOTE — DISCHARGE SUMMARY
DISCHARGE SUMMARY    ADMISSION DATE: 6/30/2022    DISCHARGE DATE: 7/6/2022    ADMITTING DIAGNOSES: History of STEMI status post PCI, multivessel coronary artery disease, stable angina, hypertension, hyperlipidemia    DISCHARGE DIAGNOSES: History of STEMI status post PCI, multivessel coronary artery disease, stable angina, hypertension, hyperlipidemia, anxiety    PROCEDURES PERFORMED: Dr. Lama 6/30/2022  CABG x3 with skeletonized LIMA to LAD, reverse saphenous vein graft to diagonal 1, reverse saphenous vein graft to OM 1, EVH and SACHA.    HISTORY OF PRESENT ILLNESS:  The patient is a 67 y.o. female with a history of HTN, hyperlipidemia who presented to [facility] via EMS on 5/11/2022 with chest pain.  EKG showed STEMI.  She was urgently taken to the cath lab where she was found to have 100% occlusion of proximal RCA and 80% distal occlusion of the RCA which were treated with drug eluting stents.  She has significant multivessel residual coronary artery disease including 90% ostial LAD, 80-90% LAD stenosis at the diagonal branch, 90% left circumflex OM branch narrowing and 80% AV trunk narrowing.  She was set-up for elective CABG.   HOSPITAL COURSE:   POD 1  HDS, SR, neuro intact, wounds CDI, abdomen soft, fluid balance positive, wt up,  Chest tube output minimal and negative for air leak.  Plan:  Dc chest tubes and garza, IS/ambulate. CPM.     POD 2 HDS, SR.  Neuro intact.  Complaints of cough overnight.  Hematoma superior to incision, pt reports as larger.  Itchiness with oxycodone, changed to norco, tolerating better.  0.5LNC.  Plan: US mass above incision.  Tessalon pearls, mucinex for cough.  Wean oxygen as tolerated.  Start diuresis/kdur.     POD 3 HDS SR.  Neuro intact.  Abdomen soft, +BM.  Hematoma has not grown.  0.5LNC, CXR today if not improved after walk.  Plan: Add metolazone for diuresis.  Wean oxygen.  Check CXR if not improved.  Monitor hematoma.     POD 4 HDS SR.  Neuro intact.  Abdomen  soft +BM.  0.5LNC.  Cr stable  Hgb improved.  Hyponatremia chronically low.  Plan: Continue diuresis.  Encourage IS/ambulation. Wean oxygen     POD 5 HTN- add ARB, SR, diuresed well- d/c lasix, hyponatremia- watch, wean O2, planning home tomorrow with home health and walker     POD 6 HDS, SR, Chronic hyponatremia- stable, Room air, mobilizing without assistance, Home health and walker ordered per PT recommendation, plan home today    TELEMETRY:  7/1 SR  7/2 SR r Pac  7/3 SR  7/4 SR  7/5 SR  7/6 SR    RECENT LABS:     Lab Results   Component Value Date/Time    SODIUM 122 (L) 07/06/2022 01:31 AM    POTASSIUM 4.0 07/06/2022 01:31 AM    CHLORIDE 82 (L) 07/06/2022 01:31 AM    CO2 29 07/06/2022 01:31 AM    GLUCOSE 129 (H) 07/06/2022 01:31 AM    BUN 13 07/06/2022 01:31 AM    CREATININE 0.45 (L) 07/06/2022 01:31 AM      Lab Results   Component Value Date/Time    WBC 8.4 07/06/2022 01:31 AM    RBC 3.65 (L) 07/06/2022 01:31 AM    HEMOGLOBIN 11.4 (L) 07/06/2022 01:31 AM    HEMATOCRIT 31.6 (L) 07/06/2022 01:31 AM    MCV 86.6 07/06/2022 01:31 AM    MCH 31.2 07/06/2022 01:31 AM    MCHC 36.1 (H) 07/06/2022 01:31 AM    MPV 9.1 07/06/2022 01:31 AM    NEUTSPOLYS 68.60 06/28/2022 12:16 PM    LYMPHOCYTES 20.50 (L) 06/28/2022 12:16 PM    MONOCYTES 9.60 06/28/2022 12:16 PM    EOSINOPHILS 0.30 06/28/2022 12:16 PM    BASOPHILS 0.80 06/28/2022 12:16 PM      Lab Results   Component Value Date/Time    PROTHROMBTM 18.4 (H) 06/30/2022 12:21 PM    INR 1.57 (H) 06/30/2022 12:21 PM        ALLERGIES:     Oxycodone, Dilaudid [hydromorphone], and Morphine    EJECTION FRACTION:  70%    CARDIAC MEDICATIONS:    ASA - Yes    Plavix - Yes- Brilinta    Post-operative Beta Blockers - Yes    Ace/ARB- Yes    Statin - Yes    Aldactone- No; contraindicated because of Normal EF    DISCHARGE MEDICATIONS:      Medication List      START taking these medications      Instructions   ALPRAZolam 0.25 MG Tabs  Commonly known as: XANAX   Take 1 Tablet by mouth 3 times a  day as needed for Anxiety for up to 5 days.  Dose: 0.25 mg     furosemide 20 MG Tabs  Commonly known as: LASIX   Take 1 Tablet by mouth every day.  Dose: 20 mg     losartan 50 MG Tabs  Start taking on: July 7, 2022  Commonly known as: COZAAR   Take 1 Tablet by mouth every day.  Dose: 50 mg     potassium chloride SA 10 MEQ Tbcr  Start taking on: July 7, 2022  Commonly known as: K-DUR   Take 1 Tablet by mouth every day.  Dose: 10 mEq     traMADol 50 MG Tabs  Commonly known as: ULTRAM   Take 1 Tablet by mouth every 8 hours as needed (prn mild pain (NRS/WBF/FLACC Pain Scale 1-3, CPOT 1-2); use for mild pain if acetaminophen ineffective) for up to 14 days.  Dose: 50 mg        CONTINUE taking these medications      Instructions   acetaminophen 500 MG Tabs  Commonly known as: TYLENOL   Take 500-1,000 mg by mouth every 6 hours as needed.  Dose: 500-1,000 mg     aspirin 81 MG EC tablet   Take 81 mg by mouth every day.  Dose: 81 mg     atorvastatin 40 MG Tabs  Commonly known as: LIPITOR   Take 40 mg by mouth every evening.  Dose: 40 mg     Brilinta 90 MG Tabs tablet  Generic drug: ticagrelor   Take 90 mg by mouth 2 times a day.  Dose: 90 mg     citalopram 20 MG Tabs  Commonly known as: CeleXA   Take 20 mg by mouth every evening.  Dose: 20 mg     famotidine 20 MG Tabs  Commonly known as: PEPCID   Take 20 mg by mouth 2 times a day.  Dose: 20 mg     metoprolol tartrate 25 MG Tabs  Commonly known as: LOPRESSOR   Take 25 mg by mouth 2 times a day.  Dose: 25 mg     nitroglycerin 0.4 MG Subl  Commonly known as: NITROSTAT   Place 0.4 mg under the tongue as needed.  Dose: 0.4 mg            NARCOTIC PAIN MEDICATIONS:   In prescribing controlled substances to this patient, I certify that I have obtained and reviewed their medical history. I have also made a good princess effort to obtain applicable records from other providers who have treated the patient .    I have conducted a physical exam and documented it. I have reviewed the  patient's prescription history as maintained by the [place] Prescription Monitoring Program.     I have assessed the patient’s risk for abuse, dependency, and addiction using the validated Opioid Risk Tool.    Given the above, I believe the benefits of controlled substance therapy outweigh the risks. The reasons for prescribing controlled substances include non-narcotic, oral analgesic alternatives have been inadequate for pain control. Accordingly, I have discussed the risk and benefits, treatment plan, and alternative therapies with the patient.     Pt understands this prescription is a controlled substance which is potentially habit-forming and its use is regulated by the [facility]. It must be submitted to the pharmacy within 5 days of the date written and can not be called in or faxed to the pharmacy. Refills are subject to terms of a medicine agreement. Any refill requires a new prescription that must be obtained from this office during regular office hours Monday through Thursday 7 am to 4 pm. We ask for 72 hours notice to get an appointment for a narcotic pain medication refill. This medicine can cause nausea, significant constipation, sedation, confusion.     DIET:   Cardiac diet    DISCHARGE INSTRUCTIONS DISCUSSED WITH THE PATIENT:      1. NO driving for 4 weeks after surgery. You may ride as a passenger.  2. NO lifting of any item over 10 lbs (e.g. gallon of milk) for 6 weeks after surgery.  3. DO walk as much as possible! Walk a minimum of once a day. Depending on your fatigue and comfort level, you may walk as much as you wish. There is no maximum.  4. Other physical activities (sex, housework, gardening, etc.) are OK after 4 weeks   5. Continue using incentive spirometer for 2 weeks, especially if going home on oxygen.    Incision Care:  1. SHOWER ONLY - no baths. Clean incision daily with plain Ivory ® soap or any other dye or perfume free soap. Then pat incision dry with clean towel. Avoid creams or lotions  on the incision(s).  a. If there is any increase in redness or swelling, or separation of the incision line, or thick drainage* from any of the incisions, call right away  * Clear, thin drainage is not abnormal especially from the leg incision and/or                         chest tube sites.  2. Continue to wear your [place] Stockings for 4 weeks. You may take off the stockings when in bed or when the legs are elevated.    Patient instructed to call [facility] cardiac surgery at [phone]  if any increased shortness of breath, uncontrolled pain, weight gain greater than 2 pounds in 1 day, SBP >140, HR <60 or redness swelling or drainage of incisions.      FOLLOW-UP:   Future Appointments   Date Time Provider Department Center   8/8/2022  1:45 PM CT RESOURCE PROVIDER [facility] None

## 2022-07-06 NOTE — DISCHARGE PLANNING
Home Health choice received @1226. Referral set to [facility] per choice form @1229.     @1341  Agency/Facility Name: [facility]   Spoke To: Bronwyn   Outcome: Referral just received. Bronwyn to review and call this DPA back once it has been looked over.     @8169  Agency/Facility: [facility]   Outcome: Left a voicemail for Bronwyn to see if she has reviewed referral yet. Waiting for a callback.      @1448  Agency/Facility Name: [facility]   Spoke To: Bronwyn  Outcome:  Will pt need PT and INR? Can do in the next couple of days. Otherwise will be accepting pt. CM notified.

## 2022-07-06 NOTE — DISCHARGE INSTRUCTIONS
Discharge Instructions    Discharged to home by car with friend. Discharged via wheelchair, hospital escort: Yes.  Special equipment needed: Walker    Be sure to schedule a follow-up appointment with your primary care doctor or any specialists as instructed.     Discharge Plan:   Diet Plan: Discussed  Activity Level: Discussed  Confirmed Follow up Appointment: Appointment Scheduled  Confirmed Symptoms Management: Discussed  Medication Reconciliation Updated: No (Comments)    I understand that a diet low in cholesterol, fat, and sodium is recommended for good health. Unless I have been given specific instructions below for another diet, I accept this instruction as my diet prescription.   Other diet: Cardiac    Special Instructions: DIVISION OF CARDIAC SURGERY DISCHARGE INSTRUCTIONS    Activity:    NO driving for 4 weeks after surgery. You may ride as a passenger.  NO lifting more than 10 pounds for 6 weeks.  For the next 6 weeks, keep your elbows close to your body and move within a pain-free motion when lifting, pushing or pulling.  Do not stretch both arms backwards at the same time.    Walk at least 4 times per day, there is no maximum.  Other physical activities (sex, housework, gardening, etc.) are okay after 4 weeks.  Continue using incentive spirometer for 2 weeks.  If you are going home on oxygen and you were not on oxygen prior to surgery, keep using until you are oxygen free.  Weigh yourself daily.  Call your Cardiologist for a weight gain of 2 or more pounds within 48 hours.  Take all of your medications as prescribed    Incision Care:    SHOWER EVERYDAY-no baths. Make sure to clean your incision(s) twice daily with plain, perfume and dye-free soap.  Then pat incision(s) dry with clean towel. No creams or lotions on your incision(s).    If you are discharging with a Prevena bandage on your chest, you or your home health nurse may remove the bandage 7 days after surgery, or sooner if the battery runs out  or the device alarms. When the battery runs out, the bandage will lose suction and it will puff up.  To remove, slowly roll down the edges of the bandage. Throw away the entire bandage and the battery pack.  Keep the incision open to air and clean twice daily with soap and water.  If there is any increased redness or swelling, separation of the incision line, or thick drainage from any of your incisions, call the Cardiac Surgeons ([phone]).    Continue to wear your R[place] Stockings for 4 weeks. You may take them off when you are in bed or when your legs are elevated.    General Instructions:    You have been referred to Cardiac Rehab.  You can start Cardiac Rehab 30 days after surgery.  If you do not have an appointment at the time of discharge call [phone] to schedule an appointment.  Your Primary Care Doctor typically handles home oxygen. Oxygen may be stopped when your oxygen level is consistently greater than 90.  Check with your Primary Care Doctor if you are unsure.  Take all of your medications (including pain medications) as prescribed.  Taking medications other than prescribed can result in serious injury.    For Patients Discharged with Narcotic Pain Medication:     If a refill is needed, understand that only 1 refill will be provided and you must come to the Cardiac Surgeons’ office for an appointment (72 hours’ notice is required to schedule and there are no weekend appointments).  If the pain medications you are discharged on are not working, you will need to bring your remaining prescription into the office in order to receive a new prescription.  If you were taking narcotics prior to your heart surgery, the Cardiac Surgeons will provide you with one prescription and additional medications will need to be provided by your pain management doctor.  Do not drink alcohol while taking narcotics.  Lost or stolen medications will not be refilled.  If medications are stolen, report to law  enforcement.    Contact Cardiac Surgery at [phone] if you have any questions.    -Is this patient being discharged with medication to prevent blood clots?  Yes, Aspirin   Aspirin, ASA oral tablets  What is this medicine?  ASPIRIN (AS pir in) is a pain reliever. It is used to treat mild pain and fever. This medicine is also used as directed by a doctor to prevent and to treat heart attacks, to prevent strokes and blood clots, and to treat arthritis or inflammation.  This medicine may be used for other purposes; ask your health care provider or pharmacist if you have questions.  COMMON BRAND NAME(S): Aspir-Low, Aspir-Kellen, Aspirtab, Avel Advanced Aspirin, Avel Aspirin, Avel Aspirin Extra Strength, Avel Aspirin Plus, Avel Extra Strength, Avel Extra Strength Plus, Avel Genuine Aspirin, Avel Womens Aspirin, Bufferin, Bufferin Extra Strength, Bufferin Low Dose  What should I tell my health care provider before I take this medicine?  They need to know if you have any of these conditions:  anemia  asthma  bleeding problems  child with chickenpox, the flu, or other viral infection  diabetes  gout  if you frequently drink alcohol containing drinks  kidney disease  liver disease  low level of vitamin K  lupus  smoke tobacco  stomach ulcers or other problems  an unusual or allergic reaction to aspirin, tartrazine dye, other medicines, dyes, or preservatives  pregnant or trying to get pregnant  breast-feeding  How should I use this medicine?  Take this medicine by mouth with a glass of water. Follow the directions on the package or prescription label. You can take this medicine with or without food. If it upsets your stomach, take it with food. Do not take your medicine more often than directed.  Talk to your pediatrician regarding the use of this medicine in children. While this drug may be prescribed for children as young as 12 years of age for selected conditions, precautions do apply. Children and teenagers  should not use this medicine to treat chicken pox or flu symptoms unless directed by a doctor.  Patients over 65 years old may have a stronger reaction and need a smaller dose.  Overdosage: If you think you have taken too much of this medicine contact a poison control center or emergency room at once.  NOTE: This medicine is only for you. Do not share this medicine with others.  What if I miss a dose?  If you are taking this medicine on a regular schedule and miss a dose, take it as soon as you can. If it is almost time for your next dose, take only that dose. Do not take double or extra doses.  What may interact with this medicine?  Do not take this medicine with any of the following medications:  cidofovir  ketorolac  probenecid  This medicine may also interact with the following medications:  alcohol  alendronate  bismuth subsalicylate  flavocoxid  herbal supplements like feverfew, garlic, zack, ginkgo biloba, horse chestnut  medicines for diabetes or glaucoma like acetazolamide, methazolamide  medicines for gout  medicines that treat or prevent blood clots like enoxaparin, heparin, ticlopidine, warfarin  other aspirin and aspirin-like medicines  NSAIDs, medicines for pain and inflammation, like ibuprofen or naproxen  pemetrexed  sulfinpyrazone  varicella live vaccine  This list may not describe all possible interactions. Give your health care provider a list of all the medicines, herbs, non-prescription drugs, or dietary supplements you use. Also tell them if you smoke, drink alcohol, or use illegal drugs. Some items may interact with your medicine.  What should I watch for while using this medicine?  If you are treating yourself for pain, tell your doctor or health care professional if the pain lasts more than 10 days, if it gets worse, or if there is a new or different kind of pain. Tell your doctor if you see redness or swelling. Also, check with your doctor if you have a fever that lasts for more than 3  days. Only take this medicine to prevent heart attacks or blood clotting if prescribed by your doctor or health care professional.  Do not take aspirin or aspirin-like medicines with this medicine. Too much aspirin can be dangerous. Always read the labels carefully.  This medicine can irritate your stomach or cause bleeding problems. Do not smoke cigarettes or drink alcohol while taking this medicine. Do not lie down for 30 minutes after taking this medicine to prevent irritation to your throat.  If you are scheduled for any medical or dental procedure, tell your healthcare provider that you are taking this medicine. You may need to stop taking this medicine before the procedure.  This medicine may be used to treat migraines. If you take migraine medicines for 10 or more days a month, your migraines may get worse. Keep a diary of headache days and medicine use. Contact your healthcare professional if your migraine attacks occur more frequently.  What side effects may I notice from receiving this medicine?  Side effects that you should report to your doctor or health care professional as soon as possible:  allergic reactions like skin rash, itching or hives, swelling of the face, lips, or tongue  breathing problems  changes in hearing, ringing in the ears  confusion  general ill feeling or flu-like symptoms  pain on swallowing  redness, blistering, peeling or loosening of the skin, including inside the mouth or nose  signs and symptoms of bleeding such as bloody or black, tarry stools; red or dark-brown urine; spitting up blood or brown material that looks like coffee grounds; red spots on the skin; unusual bruising or bleeding from the eye, gums, or nose  trouble passing urine or change in the amount of urine  unusually weak or tired  yellowing of the eyes or skin  Side effects that usually do not require medical attention (report to your doctor or health care professional if they continue or are  bothersome):  diarrhea or constipation  headache  nausea, vomiting  stomach gas, heartburn  This list may not describe all possible side effects. Call your doctor for medical advice about side effects. You may report side effects to FDA at 8-965-EWJ-5693.  Where should I keep my medicine?  Keep out of the reach of children.  Store at room temperature between 15 and 30 degrees C (59 and 86 degrees F). Protect from heat and moisture. Do not use this medicine if it has a strong vinegar smell. Throw away any unused medicine after the expiration date.  NOTE: This sheet is a summary. It may not cover all possible information. If you have questions about this medicine, talk to your doctor, pharmacist, or health care provider.  © 2020 Elsevier/Gold Standard (2018-01-30 10:42:13)    Is patient discharged on Warfarin / Coumadin?   No

## 2022-07-06 NOTE — CARE PLAN
Problem: Post Op Day 5 CABG/Heart Valve Replacement  Goal: Optimal care of the Post Op CABG/Heart Valve replacement Post Op Day 5  Outcome: Progressing

## 2022-07-14 ENCOUNTER — TELEPHONE (OUTPATIENT)
Dept: CARDIOTHORACIC SURGERY | Facility: MEDICAL CENTER | Age: 67
End: 2022-07-14
Payer: MEDICARE

## 2022-07-14 NOTE — TELEPHONE ENCOUNTER
Patient called and was concerned about her chest tube site after scab came off.  Picture was requested and was shown to RUTH Farah who is not concerned at this time given the scab just came of and     Rony was informed to continue to monitor and to call if streaking, smell, or fever occurs.    Call time 2 minutes

## 2022-07-18 NOTE — DOCUMENTATION QUERY
"                                                                         [facility]                                                                       Query Response Note      PATIENT:               ROSEMARIE ANTONIO  ACCT #:                  [id number]  MRN:                     [id number]  :                      1955  ADMIT DATE:       2022 12:08 PM  DISCH DATE:          RESPONDING  PROVIDER #:        [id number]           QUERY TEXT:    \"Hematoma superior to incision\" has developed in the postoperative period.  Can the relationship be further specified?    NOTE:  If an appropriate response is not listed below, please respond with a new note.        The patient's Clinical Indicators include:  Progress Notes 22 Dr. MARIANA Jordan  Assessment/Plan:  POD 2 HDS, SR.  Hematoma superior to incision, pt reports as larger.  Itchiness with oxycodone, changed to norco, tolerating better.  0.5LNC.  Plan: US mass above incision.      POD 3 HDS SR.   Hematoma has not grown.  Monitor hematoma.    Treatment: U/S done, monitoring of hematoma  Risk Factors: recent CABG    Maribel Arriola  Associate  - Inpatient  [email or web address]  Options provided:   -- Hematoma superior to incision is unexpected and a complication of the procedure performed   -- Hematoma superior to incision is not routinely expected, but integral/inherent to the procedure performed   -- Hematoma superior to incision is routinely expected and integral/inherent to the procedure performed   -- Hematoma superior to incision is incidental and without clinical significance   -- Unable to determine      Query created by: Maribel Arriola on 2022 4:51 PM    RESPONSE TEXT:    Hematoma superior to incision is incidental and without clinical significance          Electronically signed by:  DIMITRI JORDAN MD 2022 6:59 AM              "

## 2022-08-04 NOTE — PROGRESS NOTES
"CHIEF COMPLAINT: Post-op visit     PROCEDURE: Dr. Lama 6/30/2022  CABG x3 with skeletonized LIMA to LAD, reverse saphenous vein graft to diagonal 1, reverse saphenous vein graft to OM 1, EVH and SACHA.    HPI: She has seen cardiology.  [facility] nor [facility] take her insurance for cardiac rehab.  She states she had a gurgling in her chest.  A chest XRay was done at [facility].  Her lungs sound clear to the bases.  She restarted her lasix after she felt the gurgling.  Gurgling has resolved.     BP (!) 142/72 (BP Location: Left arm, Patient Position: Sitting, BP Cuff Size: Adult)   Pulse 80   Temp (!) 35.8 °C (96.4 °F) (Temporal)   Ht 1.651 m (5' 5\")   Wt 72.3 kg (159 lb 4.8 oz)   SpO2 97%     PHYSICAL EXAM:  Physical Exam   Cardiac: S1S2  Neuro:  AAO x 3  Resp:  CTA  Wounds:  CDI  Sternum:  Stable    PLAN: discharge from clinic.    Continue brilinta for one year or per your Cardiologist's recommendations.  We reviewed post operative sternal precautions, weight limits and driving precautions moving forward.  We reviewed SBE prophylaxis.      Overall, we are very pleased with the patient’s progress and we have instructed the patient to follow-up with us in the future should they have any concerns related to their surgery. Otherwise, we will see the patient on a PRN basis. The patient will continue to follow-up with their Cardiologist and PCP.  The patient has been informed that any further prescription refills should be done through their primary care physician and/or cardiologist.  They acknowledged understanding.  Thank you for allowing us to participate in the care of this very pleasant patient and please let us know if there is any way we may be of further assistance.  "

## 2022-08-08 ENCOUNTER — OFFICE VISIT (OUTPATIENT)
Dept: CARDIOTHORACIC SURGERY | Facility: MEDICAL CENTER | Age: 67
End: 2022-08-08
Payer: MEDICARE

## 2022-08-08 VITALS
WEIGHT: 159.3 LBS | OXYGEN SATURATION: 97 % | HEIGHT: 65 IN | SYSTOLIC BLOOD PRESSURE: 142 MMHG | DIASTOLIC BLOOD PRESSURE: 72 MMHG | BODY MASS INDEX: 26.54 KG/M2 | HEART RATE: 80 BPM | TEMPERATURE: 96.4 F

## 2022-08-08 DIAGNOSIS — I25.84 CORONARY ARTERY DISEASE DUE TO CALCIFIED CORONARY LESION: ICD-10-CM

## 2022-08-08 DIAGNOSIS — I25.10 CORONARY ARTERY DISEASE DUE TO CALCIFIED CORONARY LESION: ICD-10-CM

## 2022-08-08 PROCEDURE — 99024 POSTOP FOLLOW-UP VISIT: CPT | Performed by: CLINICAL NURSE SPECIALIST

## 2022-08-08 ASSESSMENT — FIBROSIS 4 INDEX: FIB4 SCORE: 0.68

## 2022-10-27 ENCOUNTER — TELEPHONE (OUTPATIENT)
Dept: HEALTH INFORMATION MANAGEMENT | Facility: OTHER | Age: 67
End: 2022-10-27
Payer: MEDICARE

## 2022-10-28 NOTE — TELEPHONE ENCOUNTER
[facility]    , [facility] with [facility] and then she stated member was already on speaker phone    Scheduled [facility],    [facility] PCP- unable to schedule in [place] location, escalated and advise member she will get a call back to schedule  Already a MyChart user- provided user name and reset PW to default  [facility] stated she already advised member and she will decide before 1/1/22  DME-none    member has no further questions

## 2022-11-02 PROBLEM — I70.0 ARTERIOSCLEROSIS OF AORTA (HCC): Status: ACTIVE | Noted: 2022-11-02

## 2022-11-02 PROBLEM — E87.1 HYPONATREMIA: Status: ACTIVE | Noted: 2022-11-02

## 2022-11-02 PROBLEM — G47.00 INSOMNIA: Status: ACTIVE | Noted: 2022-11-02

## 2022-11-02 PROBLEM — F10.29 ALCOHOL DEPENDENCE WITH UNSPECIFIED ALCOHOL-INDUCED DISORDER (HCC): Status: ACTIVE | Noted: 2022-11-02

## 2022-11-02 PROBLEM — I77.9 DISORDER OF ARTERIES AND ARTERIOLES, UNSPECIFIED (HCC): Status: ACTIVE | Noted: 2022-11-02

## 2022-11-02 PROBLEM — M85.80 OSTEOPENIA: Status: ACTIVE | Noted: 2022-11-02

## 2022-11-02 PROBLEM — F39 MOOD DISORDER (HCC): Status: ACTIVE | Noted: 2022-11-02

## 2022-11-02 PROBLEM — Z99.11 ON MECHANICALLY ASSISTED VENTILATION (HCC): Status: RESOLVED | Noted: 2022-06-30 | Resolved: 2022-11-02

## 2022-11-02 PROBLEM — F10.21 ALCOHOL DEPENDENCE IN REMISSION (HCC): Status: ACTIVE | Noted: 2022-11-02

## 2022-11-07 ENCOUNTER — PATIENT MESSAGE (OUTPATIENT)
Dept: HEALTH INFORMATION MANAGEMENT | Facility: OTHER | Age: 67
End: 2022-11-07

## 2022-12-13 ENCOUNTER — TELEPHONE (OUTPATIENT)
Dept: HEALTH INFORMATION MANAGEMENT | Facility: OTHER | Age: 67
End: 2022-12-13
Payer: MEDICARE

## 2023-09-11 ENCOUNTER — TELEPHONE (OUTPATIENT)
Dept: HEALTH INFORMATION MANAGEMENT | Facility: OTHER | Age: 68
End: 2023-09-11

## 2023-11-22 ENCOUNTER — OFFICE VISIT (OUTPATIENT)
Dept: MEDICAL GROUP | Facility: PHYSICIAN GROUP | Age: 68
End: 2023-11-22
Payer: MEDICARE

## 2023-11-22 VITALS
OXYGEN SATURATION: 96 % | WEIGHT: 164.5 LBS | DIASTOLIC BLOOD PRESSURE: 62 MMHG | HEART RATE: 60 BPM | RESPIRATION RATE: 16 BRPM | BODY MASS INDEX: 27.41 KG/M2 | TEMPERATURE: 96.6 F | HEIGHT: 65 IN | SYSTOLIC BLOOD PRESSURE: 132 MMHG

## 2023-11-22 DIAGNOSIS — Z00.00 PREVENTATIVE HEALTH CARE: ICD-10-CM

## 2023-11-22 DIAGNOSIS — R29.6 RECURRENT FALLS: ICD-10-CM

## 2023-11-22 DIAGNOSIS — F10.21 ALCOHOL DEPENDENCE IN REMISSION (HCC): ICD-10-CM

## 2023-11-22 DIAGNOSIS — Z95.1 S/P CABG X 3: ICD-10-CM

## 2023-11-22 DIAGNOSIS — Z78.0 POSTMENOPAUSAL: ICD-10-CM

## 2023-11-22 DIAGNOSIS — R73.03 PREDIABETES: ICD-10-CM

## 2023-11-22 DIAGNOSIS — Z12.31 ENCOUNTER FOR SCREENING MAMMOGRAM FOR BREAST CANCER: ICD-10-CM

## 2023-11-22 DIAGNOSIS — I25.10 CORONARY ARTERY DISEASE INVOLVING NATIVE CORONARY ARTERY OF NATIVE HEART WITHOUT ANGINA PECTORIS: ICD-10-CM

## 2023-11-22 DIAGNOSIS — E87.1 HYPONATREMIA: ICD-10-CM

## 2023-11-22 DIAGNOSIS — F39 MOOD DISORDER (HCC): ICD-10-CM

## 2023-11-22 DIAGNOSIS — I70.0 ARTERIOSCLEROSIS OF AORTA (HCC): ICD-10-CM

## 2023-11-22 DIAGNOSIS — E78.00 HYPERCHOLESTEROLEMIA: ICD-10-CM

## 2023-11-22 DIAGNOSIS — Z78.9 STATIN INTOLERANCE: ICD-10-CM

## 2023-11-22 DIAGNOSIS — M85.80 OSTEOPENIA, UNSPECIFIED LOCATION: ICD-10-CM

## 2023-11-22 DIAGNOSIS — Z12.11 COLON CANCER SCREENING: ICD-10-CM

## 2023-11-22 DIAGNOSIS — I10 PRIMARY HYPERTENSION: ICD-10-CM

## 2023-11-22 DIAGNOSIS — E66.9 OBESITY (BMI 30-39.9): ICD-10-CM

## 2023-11-22 DIAGNOSIS — R53.1 GENERALIZED WEAKNESS: ICD-10-CM

## 2023-11-22 PROCEDURE — 3078F DIAST BP <80 MM HG: CPT | Performed by: FAMILY MEDICINE

## 2023-11-22 PROCEDURE — 3075F SYST BP GE 130 - 139MM HG: CPT | Performed by: FAMILY MEDICINE

## 2023-11-22 PROCEDURE — 99204 OFFICE O/P NEW MOD 45 MIN: CPT | Performed by: FAMILY MEDICINE

## 2023-11-22 RX ORDER — METOPROLOL SUCCINATE 25 MG/1
25 TABLET, EXTENDED RELEASE ORAL DAILY
Qty: 100 TABLET | Refills: 3 | Status: SHIPPED | OUTPATIENT
Start: 2023-11-22

## 2023-11-22 RX ORDER — NITROGLYCERIN 0.4 MG/1
0.4 TABLET SUBLINGUAL
Qty: 12 TABLET | Refills: 3 | Status: SHIPPED | OUTPATIENT
Start: 2023-11-22

## 2023-11-22 RX ORDER — CITALOPRAM 40 MG/1
40 TABLET ORAL DAILY
Qty: 100 TABLET | Refills: 3 | Status: SHIPPED | OUTPATIENT
Start: 2023-11-22

## 2023-11-22 RX ORDER — OLMESARTAN MEDOXOMIL 20 MG/1
20 TABLET ORAL DAILY
Qty: 100 TABLET | Refills: 3 | Status: SHIPPED | OUTPATIENT
Start: 2023-11-22

## 2023-11-22 ASSESSMENT — FIBROSIS 4 INDEX: FIB4 SCORE: 0.65

## 2023-11-22 NOTE — PROGRESS NOTES
"CHIEF COMPLAINT / REASON FOR VISIT  Rony Chilel is a 68 y.o. female that presents today to [facility] care.    HISTORY OF PRESENT ILLNESS  Question re: dosing of metoprolol and losartan  Is getting bradycardia in the 40s after she takes her dose of metoprolol.  Systolic blood pressures at home running 130s to 140s    Reports that since starting atorvastatin 40 mg daily she reports difficulty with cognitive function, Memory has been impaired    Generalized weakness with frequent falls, desires PT    Past Surgical History:   Procedure Laterality Date    MULTIPLE CORONARY ARTERY BYPASS ENDO VEIN HARVEST  2022    Procedure: CABG, WITH ENDOSCOPIC VEIN PROCUREMENT-X3;  Surgeon: Irvin Lama D.O.;  Location: SURGERY [facility];  Service: Cardiothoracic    ECHOCARDIOGRAM, TRANSESOPHAGEAL, INTRAOPERATIVE  2022    Procedure: ECHOCARDIOGRAM, TRANSESOPHAGEAL, INTRAOPERATIVE;  Surgeon: Irvin Lama D.O.;  Location: SURGERY [facility];  Service: Cardiothoracic    CHOLECYSTECTOMY      Open    MAMMOPLASTY AUGMENTATION Bilateral      Social History     Tobacco Use    Smoking status: Former     Current packs/day: 0.00     Average packs/day: 0.5 packs/day for 15.0 years (7.5 ttl pk-yrs)     Types: Cigarettes     Start date: 2007     Quit date: 2022     Years since quittin.4    Smokeless tobacco: Never   Vaping Use    Vaping Use: Never used   Substance Use Topics    Alcohol use: No     Comment: social    Drug use: Yes     Comment: edibles, marijuana     OBJECTIVE    /62 (BP Location: Right arm, Patient Position: Sitting, BP Cuff Size: Adult)   Pulse 60   Temp 35.9 °C (96.6 °F) (Temporal)   Resp 16   Ht 1.651 m (5' 5\")   Wt 74.6 kg (164 lb 8 oz)   SpO2 96%   BMI 27.37 kg/m²      PHYSICAL EXAM  Constitutional: Sitting comfortably, in no acute distress, responds to questions appropriately.  Head: Normocephalic  Eyes:  No conjunctival injection, no scleral icterus, PERRL  Ears: " External ear canals clear, TMs pearly grey with visualized bony landmarks and crisp light reflex  Mouth: Oral mucosa moist  Throat: Oropharynx clear without erythema or tonsillar exudates  Neck: No cervical lymphadenopathy  Heart: Regular S1 S2, no murmurs, rub, or gallops  Lungs: Clear to auscultation bilaterally, no wheezes, rales, or rhonchi  Extremities: No lower extremity edema. 2+ symmetric radial pulses  Skin: Warm and dry, no rashes or lesions on face or exposed upper extremities    ASSESSMENT & PLAN  1. S/P CABG x 3  2. Statin intolerance  3. Coronary artery disease involving native coronary artery of native heart without angina pectoris  4. Primary hypertension  5. Hypercholesterolemia  STEMI May 2022 with coronary artery stent placement x2, followed by CABG x3 in June 2022.  Denies angina. Currently taking aspirin 81 mg daily, metoprolol tartrate 25 mg twice daily, losartan 50 mg daily.  Due to slightly uncontrolled blood pressures we will switch losartan to olmesartan 20 mg daily.  Will switch metoprolol tartrate to metoprolol succinate 25 mg daily, given bradycardic episodes at home.  She does not tolerate statin due to memory/cognitive impairment.  We will switch to evolocumab 140 mg every 2 weeks.  She needs to establish with new cardiologist so referral has been placed.  - REFERRAL TO CARDIOLOGY  - Evolocumab (REPATHA) Solution Prefilled Syringe SubQ injection syringe; Inject 1 mL under the skin every 14 days.  Dispense: 2 mL; Refill: 3  - metoprolol SR (TOPROL XL) 25 MG TABLET SR 24 HR; Take 1 Tablet by mouth every day.  Dispense: 90 Tablet; Refill: 3  - olmesartan (BENICAR) 20 MG Tab; Take 1 Tablet by mouth every day.  Dispense: 90 Tablet; Refill: 3  - nitroglycerin (NITROSTAT) 0.4 MG SL Tab; Place 1 Tablet under the tongue one time as needed for Chest Pain for up to 1 dose.  Dispense: 12 Tablet; Refill: 3  - CRP HIGH SENSITIVE (CARDIAC); Future  - Lipid Profile; Future  - Comp Metabolic Panel;  Future  - CBC WITH DIFFERENTIAL; Future  - Lipoprotein (a); Future    6. Arteriosclerosis of aorta (HCC)  Aortic arteriosclerosis seen incidentally on chest xray 6/28/22. Continue to control atherosclerotic risk factors. Follow up annually.    8. Osteopenia, unspecified location  9. Postmenopausal  DEXA in 2018 showed osteopenia, due for repeat.  - DS-BONE DENSITY STUDY (DEXA); Future  - VITAMIN D,25 HYDROXY (DEFICIENCY); Future    10. Generalized weakness  11. Recurrent falls  Chronic generalized weakness with recurrent falls, recommend physical therapy for strengthening and balance training  - Referral to Physical Therapy    12. Obesity (BMI 30-39.9)  Counseled regarding importance of regular exercise and healthy dietary habits for weight loss.  - HEMOGLOBIN A1C; Future    13. Mood disorder (HCC)  Chronic major depression and anxiety disorder, well-controlled with citalopram 40 mg daily.  Continue current therapy.  - citalopram (CELEXA) 40 MG Tab; Take 1 Tablet by mouth every day.  Dispense: 90 Tablet; Refill: 3    14. Hyponatremia  Chronic, stable, due for repeat labs    15. Prediabetes  - HEMOGLOBIN A1C; Future    16. Alcohol dependence in remission (HCC)  Self identifies as an alcoholic but has been sober since August 2022.    17. Encounter for screening mammogram for breast cancer  - MA-SCREENING MAMMO BILAT W/IMPLANTS W/SONNY W/CAD; Future    18. Preventative health care  - HEMOGLOBIN A1C; Future    19. Colon cancer screening  - COLOGUARD (FIT DNA)    Follow-up visit after lab work

## 2023-11-27 PROCEDURE — RXMED WILLOW AMBULATORY MEDICATION CHARGE: Performed by: FAMILY MEDICINE

## 2023-12-01 ENCOUNTER — PHARMACY VISIT (OUTPATIENT)
Dept: PHARMACY | Facility: MEDICAL CENTER | Age: 68
End: 2023-12-01
Payer: COMMERCIAL

## 2023-12-07 ENCOUNTER — TELEPHONE (OUTPATIENT)
Dept: HEALTH INFORMATION MANAGEMENT | Facility: OTHER | Age: 68
End: 2023-12-07
Payer: MEDICARE

## 2023-12-28 ENCOUNTER — APPOINTMENT (OUTPATIENT)
Dept: PHYSICAL THERAPY | Facility: REHABILITATION | Age: 68
End: 2023-12-28
Attending: FAMILY MEDICINE
Payer: MEDICARE

## 2024-01-02 ENCOUNTER — PHYSICAL THERAPY (OUTPATIENT)
Dept: PHYSICAL THERAPY | Facility: REHABILITATION | Age: 69
End: 2024-01-02
Attending: FAMILY MEDICINE
Payer: MEDICARE

## 2024-01-02 DIAGNOSIS — R53.1 WEAKNESS GENERALIZED: ICD-10-CM

## 2024-01-02 DIAGNOSIS — R29.6 RECURRENT FALLS: ICD-10-CM

## 2024-01-02 PROCEDURE — 97161 PT EVAL LOW COMPLEX 20 MIN: CPT

## 2024-01-02 ASSESSMENT — ENCOUNTER SYMPTOMS
PAIN SCALE AT LOWEST: 0
PAIN SCALE: 2
QUALITY: DISCOMFORT
ALLEVIATING FACTORS: HEAT APPLICATION
ALLEVIATING FACTORS: REST
PAIN TIMING: INTERMITTENT
PAIN SCALE AT HIGHEST: 6
EXACERBATED BY: BENDING
ALLEVIATING FACTORS: PAIN MEDICATION
QUALITY: THROBBING

## 2024-01-02 ASSESSMENT — ACTIVITIES OF DAILY LIVING (ADL)
AMBULATION_WITHOUT_ASSISTIVE_DEVICE: INDEPENDENT
POOR_BALANCE: 1

## 2024-01-02 ASSESSMENT — BALANCE ASSESSMENTS
BALANCE - SITTING STATIC: GOOD
WEIGHT SHIFT STANDING: GOOD
BALANCE - SITTING DYNAMIC: GOOD
BALANCE - STANDING DYNAMIC: FAIR +
BALANCE - STANDING STATIC: FAIR +
WEIGHT SHIFT SITTING: GOOD
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
## 2024-01-03 ENCOUNTER — HOSPITAL ENCOUNTER (OUTPATIENT)
Dept: RADIOLOGY | Facility: MEDICAL CENTER | Age: 69
End: 2024-01-03
Attending: FAMILY MEDICINE
Payer: MEDICARE

## 2024-01-03 DIAGNOSIS — Z78.0 POSTMENOPAUSAL: ICD-10-CM

## 2024-01-03 DIAGNOSIS — M85.80 OSTEOPENIA, UNSPECIFIED LOCATION: ICD-10-CM

## 2024-01-03 PROCEDURE — 77080 DXA BONE DENSITY AXIAL: CPT

## 2024-01-05 ENCOUNTER — PHYSICAL THERAPY (OUTPATIENT)
Dept: PHYSICAL THERAPY | Facility: REHABILITATION | Age: 69
End: 2024-01-05
Attending: FAMILY MEDICINE
Payer: MEDICARE

## 2024-01-05 DIAGNOSIS — R29.6 RECURRENT FALLS: ICD-10-CM

## 2024-01-05 DIAGNOSIS — R53.1 WEAKNESS GENERALIZED: ICD-10-CM

## 2024-01-05 PROCEDURE — 97112 NEUROMUSCULAR REEDUCATION: CPT

## 2024-01-05 ASSESSMENT — GAIT ASSESSMENTS
GAIT AND PIVOT TURN: MILD IMPAIRMENT: PIVOT TURNS SAFELY IN >3 SECONDS AND STOPS WITH NO LOSS OF BALANCE
GAIT WITH VERTICAL HEAD TURNS: MODERATE IMPAIRMENT: PERFORMS HEAD TURNS WITH MODERATE CHANGE IN GAIT VELOCITY, SLOWS DOWN, STAGGERS BUT RECOVERS, CAN CONTINUE TO WALK
STEP OVER OBSTACLE: NORMAL: IS ABLE TO STEP OVER THE BOX WITHOUT CHANGING GAIT SPEED, NO EVIDENCE OF IMBALANCE
STEP AROUND OBSTACLES: NORMAL: IS ABLE TO WALK AROUND CONES SAFELY WITHOUT CHANGING GAIT SPEED, NO EVIDENCE OF IMBALANCE
GAIT LEVEL SURFACE: NORMAL: WALKS 20', NO ASSISTIVE DEVICES, GOOD SPEED, NO EVIDENCE FOR IMBALANCE, NORMAL GAIT PATTERN
CHANGE IN GAIT SPEED: MILD IMPAIRMENT: IS ABLE TO CHANGE SPEED BUT DEMONSTRATES MILD GAIT DEVIATIONS, OR NO GAIT DEVIATIONS BUT UNABLE TO ACHIEVE A SIGNIFICANT CHANGE IN VELOCITY, OR USES AN ASSISTIVE DEVICE
GAIT WITH HORIZONTAL HEAD TURNS: MILD IMPAIRMENT: PERFORMS HEAD TURNS SMOOTHLY WITH SLIGHT CHANGE IN GAIT VELOCITY, IE, MINOR DISRUPTION TO SMOOTH GAIT PATH OR USES WALKING AID
DYNAMIC GAIT INDEX SCORE: 70.83
STEPS: MODERATE IMPAIRMENT: TWO FEET TO A STAIR, MUST USE RAIL

## 2024-01-05 NOTE — OP THERAPY DAILY TREATMENT
Outpatient Physical Therapy  DAILY TREATMENT     [facility] Physical [address].  [address]  [place] [address]  Phone:  [phone]  Fax:  [phone]    Date: 01/05/2024    Patient: Rony Chilel  YOB: 1955  MRN: [id number]     Time Calculation    Start time: 1425  Stop time: 1511 Time Calculation (min): 46 minutes         Chief Complaint: Weakness, Loss Of Balance, and Fall    Visit #: 2    SUBJECTIVE:  Patient reports that she lost her balance once but caught herself. She reports she had weight in one hand and thinks she had too much weight in her hand. She reports she was able to complete 2 flights of stairs without difficulty.     OBJECTIVE:  Current objective measures:     6MWT: 370 meters  DGI: 17/24; difficulty with head turns and gait speed   Dynamic Gait Index Total Score: 70.83       Therapeutic Exercises (CPT 25975):     1. 6MWT, 370 meters (538 m is norm for age) ; 77 HR; 96% O2 after, x1 trip noted    2. DGI, 17/24    3. // bars:    4. feet together, eyes open/eyes closed, head turns horizontal, minimal sway with increased sway when eyes closed    5. feet together, eyes open/eyes closed, vertical head turns, moderate sway with eyes closed; tactile cues    6. airex pad, feet apart/feet together, eyes open; moderate sway demonstrated    20. PN due on 2/2/24      Therapeutic Exercise Summary: Access Code: [email or web address]  URL: [email or web address]  Date: 01/05/2024  Prepared by: Cami Lawson    Exercises  - Corner Balance Feet Apart: Eyes Open With Head Turns  - 1 x daily - 1 sets - 10 reps    Therapeutic Treatments and Modalities:     1. Neuromuscular Re-education (CPT 71482), as noted above    Time-based treatments/modalities:    Physical Therapy Timed Treatment Charges  Neuromusc re-ed, balance, coor, post minutes (CPT 85150): 46 minutes      Pain rating (1-10) before treatment:  3; mid/upper back  Pain rating (1-10) after treatment:  3; no change in  pain       ASSESSMENT:   Response to treatment: Patient tolerates PT treatment well today. Completed DGI and 6MWT as noted above as well as initiated HEP and further balance activities with patient tolerating well. Will continue with PT POC to promote improved gait and balance for increased functional mobility skills and activity tolerance.     PLAN/RECOMMENDATIONS:   Plan for treatment: therapy treatment to continue next visit.  Planned interventions for next visit: continue with current treatment.

## 2024-01-08 ENCOUNTER — PHYSICAL THERAPY (OUTPATIENT)
Dept: PHYSICAL THERAPY | Facility: REHABILITATION | Age: 69
End: 2024-01-08
Attending: FAMILY MEDICINE
Payer: MEDICARE

## 2024-01-08 DIAGNOSIS — R53.1 WEAKNESS GENERALIZED: ICD-10-CM

## 2024-01-08 DIAGNOSIS — R29.6 RECURRENT FALLS: ICD-10-CM

## 2024-01-08 PROCEDURE — 97110 THERAPEUTIC EXERCISES: CPT

## 2024-01-08 PROCEDURE — 97112 NEUROMUSCULAR REEDUCATION: CPT

## 2024-01-08 NOTE — OP THERAPY DAILY TREATMENT
"  Outpatient Physical Therapy  DAILY TREATMENT     [facility] Physical [address].  [address]  [place] [address]  Phone:  [phone]  Fax:  [phone]    Date: 01/08/2024    Patient: Rony Chilel  YOB: 1955  MRN: [id number]     Time Calculation    Start time: 1006  Stop time: 1057 Time Calculation (min): 51 minutes         Chief Complaint: Weakness, Loss Of Balance, and Difficulty Walking    Visit #: 3    SUBJECTIVE:  Patient presents to PT session and reports no falls. She reports she did trip when lifting a pot of spaghetti but she caught herself.     OBJECTIVE:  Current objective measures:     Mild dizziness reported near end of session, seated rest break provided, dizziness improves, vitals as noted:   HR: 77 bpm  SPO2: 95%  BP:108/68           Therapeutic Exercises (CPT 85355):     1. 6MWT, 370 meters (538 m is norm for age) ; 77 HR; 96% O2 after, 1/5    2. DGI, 17/24, 1/5    3. // bars:    4. feet together, eyes open/eyes closed, head turns horizontal, minimal sway overall, improved from previous session; tactile cues as needed    5. feet together, eyes open/eyes closed, vertical head turns, minimal sway with eyes closed, less with eyes open; tactile cues; improved from previous session    6. airex pad, feet together, eyes closed; mild/moderate sway demonstrated, frequently demonstrating posterior/L lateral sway    7. step over, tall hurdles, x~10 lengths; B LE lead; demonstrates L LE \"catching\" x1, able to self recover; with increased awareness, improved stability is noted and patient is able to clear B LE; constant, light tactile cues on // bars utilized throughout task    11. NuStep, x10 minutes, level 3 > level 2, SPM: 70 or above    12. standing hip abduction, 2x 10; B, pink theraband    13. standing hip extension, 2x 10, B, pink theraband    14. standing hip flexion, 2x 10, B, pink theraband    15. standing heel raises, 2x 10    20. PN due on 2/2/24      " Therapeutic Exercise Summary: Access Code: [email or web address]  URL: [email or web address]  Date: 01/05/2024  Prepared by: Cami Lawson    Exercises  - Corner Balance Feet Apart: Eyes Open With Head Turns  - 1 x daily - 1 sets - 10 reps    Therapeutic Treatments and Modalities:     1. Neuromuscular Re-education (CPT 46447), as noted above, x18 minutes    Time-based treatments/modalities:    Physical Therapy Timed Treatment Charges  Neuromusc re-ed, balance, coor, post minutes (CPT 01730): 18 minutes  Therapeutic exercise minutes (CPT 36087): 33 minutes      Pain rating (1-10) before treatment:  0  Pain rating (1-10) after treatment:  1-2; neck/shoulder discomfort     ASSESSMENT:   Response to treatment: Patient tolerates PT treatment well today. Discussed continued follow through of HEP as balance with head turns is improved from previous session. Initiated strength and endurance activities as well this date to promote increased functional mobility skills. Patient fatigues but is able to tolerate all task. At this time, patient will benefit from skilled PT follow through to promote improved strength and balance for improved safety and increased quality of life.     PLAN/RECOMMENDATIONS:   Plan for treatment: therapy treatment to continue next visit.  Planned interventions for next visit: continue with current treatment.

## 2024-01-09 ENCOUNTER — HOSPITAL ENCOUNTER (OUTPATIENT)
Dept: LAB | Facility: MEDICAL CENTER | Age: 69
End: 2024-01-09
Attending: FAMILY MEDICINE
Payer: MEDICARE

## 2024-01-09 DIAGNOSIS — Z00.00 PREVENTATIVE HEALTH CARE: ICD-10-CM

## 2024-01-09 DIAGNOSIS — Z95.1 S/P CABG X 3: ICD-10-CM

## 2024-01-09 DIAGNOSIS — R73.03 PREDIABETES: ICD-10-CM

## 2024-01-09 DIAGNOSIS — M85.80 OSTEOPENIA, UNSPECIFIED LOCATION: ICD-10-CM

## 2024-01-09 DIAGNOSIS — E66.9 OBESITY (BMI 30-39.9): ICD-10-CM

## 2024-01-09 DIAGNOSIS — I25.10 CORONARY ARTERY DISEASE INVOLVING NATIVE CORONARY ARTERY OF NATIVE HEART WITHOUT ANGINA PECTORIS: ICD-10-CM

## 2024-01-09 DIAGNOSIS — I10 PRIMARY HYPERTENSION: ICD-10-CM

## 2024-01-09 LAB
25(OH)D3 SERPL-MCNC: 28 NG/ML (ref 30–100)
ALBUMIN SERPL BCP-MCNC: 4.4 G/DL (ref 3.2–4.9)
ALBUMIN/GLOB SERPL: 1.9 G/DL
ALP SERPL-CCNC: 101 U/L (ref 30–99)
ALT SERPL-CCNC: 13 U/L (ref 2–50)
ANION GAP SERPL CALC-SCNC: 12 MMOL/L (ref 7–16)
AST SERPL-CCNC: 22 U/L (ref 12–45)
BASOPHILS # BLD AUTO: 0.8 % (ref 0–1.8)
BASOPHILS # BLD: 0.05 K/UL (ref 0–0.12)
BILIRUB SERPL-MCNC: 0.6 MG/DL (ref 0.1–1.5)
BUN SERPL-MCNC: 13 MG/DL (ref 8–22)
CALCIUM ALBUM COR SERPL-MCNC: 9.3 MG/DL (ref 8.5–10.5)
CALCIUM SERPL-MCNC: 9.6 MG/DL (ref 8.5–10.5)
CHLORIDE SERPL-SCNC: 94 MMOL/L (ref 96–112)
CHOLEST SERPL-MCNC: 121 MG/DL (ref 100–199)
CO2 SERPL-SCNC: 24 MMOL/L (ref 20–33)
CREAT SERPL-MCNC: 0.74 MG/DL (ref 0.5–1.4)
CRP SERPL HS-MCNC: 0.8 MG/L (ref 0–3)
EOSINOPHIL # BLD AUTO: 0.03 K/UL (ref 0–0.51)
EOSINOPHIL NFR BLD: 0.5 % (ref 0–6.9)
ERYTHROCYTE [DISTWIDTH] IN BLOOD BY AUTOMATED COUNT: 37.5 FL (ref 35.9–50)
EST. AVERAGE GLUCOSE BLD GHB EST-MCNC: 100 MG/DL
GFR SERPLBLD CREATININE-BSD FMLA CKD-EPI: 88 ML/MIN/1.73 M 2
GLOBULIN SER CALC-MCNC: 2.3 G/DL (ref 1.9–3.5)
GLUCOSE SERPL-MCNC: 91 MG/DL (ref 65–99)
HBA1C MFR BLD: 5.1 % (ref 4–5.6)
HCT VFR BLD AUTO: 40.2 % (ref 37–47)
HDLC SERPL-MCNC: 47 MG/DL
HGB BLD-MCNC: 14.3 G/DL (ref 12–16)
IMM GRANULOCYTES # BLD AUTO: 0.02 K/UL (ref 0–0.11)
IMM GRANULOCYTES NFR BLD AUTO: 0.3 % (ref 0–0.9)
LDLC SERPL CALC-MCNC: 43 MG/DL
LYMPHOCYTES # BLD AUTO: 1.87 K/UL (ref 1–4.8)
LYMPHOCYTES NFR BLD: 31.4 % (ref 22–41)
MCH RBC QN AUTO: 32 PG (ref 27–33)
MCHC RBC AUTO-ENTMCNC: 35.6 G/DL (ref 32.2–35.5)
MCV RBC AUTO: 89.9 FL (ref 81.4–97.8)
MONOCYTES # BLD AUTO: 0.5 K/UL (ref 0–0.85)
MONOCYTES NFR BLD AUTO: 8.4 % (ref 0–13.4)
NEUTROPHILS # BLD AUTO: 3.48 K/UL (ref 1.82–7.42)
NEUTROPHILS NFR BLD: 58.6 % (ref 44–72)
NRBC # BLD AUTO: 0 K/UL
NRBC BLD-RTO: 0 /100 WBC (ref 0–0.2)
PLATELET # BLD AUTO: 298 K/UL (ref 164–446)
PMV BLD AUTO: 9.4 FL (ref 9–12.9)
POTASSIUM SERPL-SCNC: 4.8 MMOL/L (ref 3.6–5.5)
PROT SERPL-MCNC: 6.7 G/DL (ref 6–8.2)
RBC # BLD AUTO: 4.47 M/UL (ref 4.2–5.4)
SODIUM SERPL-SCNC: 130 MMOL/L (ref 135–145)
TRIGL SERPL-MCNC: 155 MG/DL (ref 0–149)
WBC # BLD AUTO: 6 K/UL (ref 4.8–10.8)

## 2024-01-09 PROCEDURE — 83036 HEMOGLOBIN GLYCOSYLATED A1C: CPT

## 2024-01-09 PROCEDURE — 85025 COMPLETE CBC W/AUTO DIFF WBC: CPT

## 2024-01-09 PROCEDURE — 80053 COMPREHEN METABOLIC PANEL: CPT

## 2024-01-09 PROCEDURE — 36415 COLL VENOUS BLD VENIPUNCTURE: CPT

## 2024-01-09 PROCEDURE — 86141 C-REACTIVE PROTEIN HS: CPT

## 2024-01-09 PROCEDURE — 82306 VITAMIN D 25 HYDROXY: CPT

## 2024-01-09 PROCEDURE — 80061 LIPID PANEL: CPT

## 2024-01-09 PROCEDURE — 83695 ASSAY OF LIPOPROTEIN(A): CPT

## 2024-01-10 ENCOUNTER — PHYSICAL THERAPY (OUTPATIENT)
Dept: PHYSICAL THERAPY | Facility: REHABILITATION | Age: 69
End: 2024-01-10
Attending: FAMILY MEDICINE
Payer: MEDICARE

## 2024-01-10 DIAGNOSIS — R29.6 RECURRENT FALLS: ICD-10-CM

## 2024-01-10 DIAGNOSIS — R53.1 WEAKNESS GENERALIZED: ICD-10-CM

## 2024-01-10 PROCEDURE — 97110 THERAPEUTIC EXERCISES: CPT

## 2024-01-10 PROCEDURE — 97112 NEUROMUSCULAR REEDUCATION: CPT

## 2024-01-10 NOTE — OP THERAPY DAILY TREATMENT
"  Outpatient Physical Therapy  DAILY TREATMENT     [facility] Physical [address]  [address]  [place] [address]  Phone:  [phone]  Fax:  [phone]    Date: 01/10/2024    Patient: Rony Chilel  YOB: 1955  MRN: [id number]     Time Calculation    Start time: 1017  Stop time: 1106 Time Calculation (min): 49 minutes         Chief Complaint: Weakness, Loss Of Balance, and Difficulty Walking    Visit #: 4    SUBJECTIVE:  Patient reports that her legs are a little tired today. She reports her sciatica was irritated last night and her Edgar-Lozada helped.     OBJECTIVE:  Current objective measures:     HR: 85 bpm, 96% SPO2  BP: 102/82             Therapeutic Exercises (CPT 52652):     1. 6MWT, 370 meters (538 m is norm for age) ; 77 HR; 96% O2 after, 1/5    2. DGI, 17/24, 1/5    3. // bars:    4. feet together, eyes open/eyes closed, head turns horizontal, minimal sway overall, improved from previous session; tactile cues as needed, nt    5. feet together, eyes open/eyes closed, vertical head turns, minimal sway with eyes closed, less with eyes open; tactile cues; improved from previous session, nt    6. airex pad, feet together, eyes closed; mild/moderate sway demonstrated, frequently demonstrating posterior/L lateral sway, nt    7. step over, tall hurdles, x~10 lengths; B LE lead; demonstrates L LE \"catching\" x1, able to self recover; with increased awareness, improved stability is noted and patient is able to clear B LE; constant, light tactile cues on // bars utilized throughout task, nt    11. NuStep, x10 minutes, level 3 > level 2, SPM: 70 or above    12. standing hip abduction, 2x 10; B, pink theraband    13. standing hip extension, 2x 10, B, pink theraband    14. standing hip flexion, 2x 10, B, pink theraband    15. standing heel raises, x20    20. PN due on 2/2/24      Therapeutic Exercise Summary: Access Code: [email or web address]  URL: [email or web address]  Date: " 01/05/2024  Prepared by: Cami Lawson    Exercises  - Corner Balance Feet Apart: Eyes Open With Head Turns  - 1 x daily - 1 sets - 10 reps    Access Code: [email or web address]  URL: [email or web address]  Date: 01/10/2024  Prepared by: Cami Bulesly    Exercises  - Standing Hip Abduction with Counter Support  - 1 x daily - 1 sets - 10 reps  - Standing Hip Flexion with Counter Support  - 1 x daily - 1 sets - 10 reps  - Standing Hip Extension with Counter Support  - 1 x daily - 1 sets - 10 reps    Therapeutic Treatments and Modalities:     1. Neuromuscular Re-education (CPT 09876), as noted above, clock yourself geri, fatigues after 2 minutes but tolerates up to 4 minutes; demonstrates minimal missteps prior to fatigue but once fatigued patient demonstrates increasing missteps, often demonstrating x1 number off with decreased speed; able to self correct with each misstep; 50 SPM, x4 minutes, dynadisc, requires frequent tactile cues to maintain balance; posterior leaning noted, 2x 1 minute, airex pad with ball toss, no LOB demonstrated, good ankle strategies, x22 minutes total    Time-based treatments/modalities:    Physical Therapy Timed Treatment Charges  Neuromusc re-ed, balance, coor, post minutes (CPT 49632): 22 minutes  Therapeutic exercise minutes (CPT 23012): 27 minutes      Pain rating (1-10) before treatment:  2; low back; 1-2 R shoulder/neck (which patient reports typically exacerbates in the winter)   Pain rating (1-10) after treatment:  0; reports no pain after session    ASSESSMENT:   Response to treatment:   Patient tolerates PT treatment well today. Patient completes exercises well today and tolerates dynamic balance activities. Updated HEP to promote further B LE strengthening exercises. Patient reports decreased pain after session. At this time, patient will benefit from skilled PT follow up to promote improved strength and balance for increased functional mobility skill and quality of  life.    PLAN/RECOMMENDATIONS:   Plan for treatment: therapy treatment to continue next visit.  Planned interventions for next visit: continue with current treatment.

## 2024-01-11 LAB — LPA SERPL-MCNC: <6 MG/DL

## 2024-01-19 ENCOUNTER — PHYSICAL THERAPY (OUTPATIENT)
Dept: PHYSICAL THERAPY | Facility: REHABILITATION | Age: 69
End: 2024-01-19
Attending: FAMILY MEDICINE
Payer: MEDICARE

## 2024-01-19 ENCOUNTER — TELEPHONE (OUTPATIENT)
Dept: CARDIOLOGY | Facility: PHYSICIAN GROUP | Age: 69
End: 2024-01-19

## 2024-01-19 DIAGNOSIS — R29.6 RECURRENT FALLS: ICD-10-CM

## 2024-01-19 DIAGNOSIS — R53.1 WEAKNESS GENERALIZED: ICD-10-CM

## 2024-01-19 PROCEDURE — 97112 NEUROMUSCULAR REEDUCATION: CPT

## 2024-01-19 NOTE — OP THERAPY DAILY TREATMENT
"  Outpatient Physical Therapy  DAILY TREATMENT     [facility] Physical [address].  [address]  [place] [address]  Phone:  [phone]  Fax:  [phone]    Date: 01/19/2024    Patient: Rony Chilel  YOB: 1955  MRN: [id number]     Time Calculation    Start time: 1433  Stop time: 1515 Time Calculation (min): 42 minutes         Chief Complaint: Difficulty Walking, Weakness, and Loss Of Balance    Visit #: 5    SUBJECTIVE:  Patient reports that she has some pain today. 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     OBJECTIVE:  Current objective measures:           Therapeutic Exercises (CPT 96920):     1. 6MWT, 370 meters (538 m is norm for age) ; 77 HR; 96% O2 after, 1/5    2. DGI, 17/24, 1/5    3. // bars:    4. feet together, eyes open/eyes closed, head turns horizontal; air ex pad, minimal sway overall, improved from previous session; tactile cues as needed    5. feet together, eyes open/eyes closed, vertical head turns; air ex pad, minimal sway with eyes closed, less with eyes open; tactile cues; improved from previous session, feet together, eyes closed; mild/moderate sway demonstrated, frequently demonstrating posterior/L lateral sway, nt    7. step over, tall hurdles, x~10 lengths; B LE lead; demonstrates L LE \"catching\" x1, able to self recover; with increased awareness, improved stability is noted and patient is able to clear B LE; constant, light tactile cues on // bars utilized throughout task, nt    8. air ex pad with ball toss, demonstrates catching ball near edge of TRACIE; tosses ball back; 5' distances, no LOB    9. tucker disc, eyes open, several trials, frequent tactile cues; posterior and R lateral lean noted; CGA from PT to maintain safety, tolerates up to 8 seconds without support    11. NuStep, x10 minutes, level 3 > level 2, SPM: 70 or above; nt    12. standing hip abduction, 2x 10; B, pink theraband, nt    13. " "standing hip extension, 2x 10, B, pink theraband, nt    14. standing hip flexion, 2x 10, B, pink theraband, nt    15. standing heel raises, x20, nt    20. PN due on 2/2/24      Therapeutic Exercise Summary: Access Code: [email or web address]  URL: [email or web address]  Date: 01/05/2024  Prepared by: Cami Buening    Exercises  - Corner Balance Feet Apart: Eyes Open With Head Turns  - 1 x daily - 1 sets - 10 reps    Access Code: [email or web address]  URL: [email or web address]  Date: 01/10/2024  Prepared by: Cami Buening    Exercises  - Standing Hip Abduction with Counter Support  - 1 x daily - 1 sets - 10 reps  - Standing Hip Flexion with Counter Support  - 1 x daily - 1 sets - 10 reps  - Standing Hip Extension with Counter Support  - 1 x daily - 1 sets - 10 reps    Therapeutic Treatments and Modalities:     1. Neuromuscular Re-education (CPT 54330), as noted above, clock yourself geri, fatigues after 2 minutes but tolerates up to 4 minutes, 30 seconds; 50 SPM; x1 near LOB stepping back to \"7\", 6\" step overs; utilized non peferred LE; requires x1 UE support for increased safety during task; with preferred LE utilization; does not require UE support    Time-based treatments/modalities:    Physical Therapy Timed Treatment Charges  Neuromusc re-ed, balance, coor, post minutes (CPT 02966): 42 minutes      Pain rating (1-10) before treatment:  2; back pain/arthritic pain  Pain rating (1-10) after treatment:  2    ASSESSMENT:   Response to treatment: Patient tolerates PT treatment well today. Focus of today's session is balance activities with patient tolerating well. X1 LOB noted during \"clock yourself\" geri and difficulty with tucker disc however significant improvement with air ex activities is demonstrated. At this time, patient will benefit from skilled PT to promote further progress with strength and balance for improved functional mobility skills.     PLAN/RECOMMENDATIONS:   Plan for treatment: therapy treatment to continue " next visit.  Planned interventions for next visit: continue with current treatment.

## 2024-01-19 NOTE — TELEPHONE ENCOUNTER
Called and left voicemail for patient about previous cardiac history.  OK to inquire if patient calls back.

## 2024-01-19 NOTE — TELEPHONE ENCOUNTER
Caller:   Rony    Topic/issue:   May 2022- [place]   June 2022- Triple Bypass at [facility].   Dr. Victoria     Callback Number: [phone]    Thank you,   Aubrie MOORE

## 2024-01-25 ENCOUNTER — OFFICE VISIT (OUTPATIENT)
Dept: MEDICAL GROUP | Facility: PHYSICIAN GROUP | Age: 69
End: 2024-01-25
Payer: MEDICARE

## 2024-01-25 VITALS
TEMPERATURE: 96.7 F | DIASTOLIC BLOOD PRESSURE: 64 MMHG | SYSTOLIC BLOOD PRESSURE: 118 MMHG | HEIGHT: 65 IN | WEIGHT: 161 LBS | OXYGEN SATURATION: 96 % | RESPIRATION RATE: 14 BRPM | HEART RATE: 72 BPM | BODY MASS INDEX: 26.82 KG/M2

## 2024-01-25 DIAGNOSIS — E55.9 VITAMIN D DEFICIENCY: ICD-10-CM

## 2024-01-25 DIAGNOSIS — I10 PRIMARY HYPERTENSION: ICD-10-CM

## 2024-01-25 DIAGNOSIS — E87.1 HYPONATREMIA: ICD-10-CM

## 2024-01-25 DIAGNOSIS — Z23 NEED FOR VACCINATION: ICD-10-CM

## 2024-01-25 DIAGNOSIS — I25.10 CORONARY ARTERY DISEASE INVOLVING NATIVE CORONARY ARTERY OF NATIVE HEART WITHOUT ANGINA PECTORIS: ICD-10-CM

## 2024-01-25 DIAGNOSIS — M81.0 AGE-RELATED OSTEOPOROSIS WITHOUT CURRENT PATHOLOGICAL FRACTURE: ICD-10-CM

## 2024-01-25 DIAGNOSIS — Z95.1 S/P CABG X 3: ICD-10-CM

## 2024-01-25 DIAGNOSIS — E78.00 HYPERCHOLESTEROLEMIA: ICD-10-CM

## 2024-01-25 DIAGNOSIS — Z78.9 STATIN INTOLERANCE: ICD-10-CM

## 2024-01-25 PROCEDURE — G0008 ADMIN INFLUENZA VIRUS VAC: HCPCS | Performed by: FAMILY MEDICINE

## 2024-01-25 PROCEDURE — 3078F DIAST BP <80 MM HG: CPT | Performed by: FAMILY MEDICINE

## 2024-01-25 PROCEDURE — 3074F SYST BP LT 130 MM HG: CPT | Performed by: FAMILY MEDICINE

## 2024-01-25 PROCEDURE — 99214 OFFICE O/P EST MOD 30 MIN: CPT | Mod: 25 | Performed by: FAMILY MEDICINE

## 2024-01-25 PROCEDURE — 90662 IIV NO PRSV INCREASED AG IM: CPT | Performed by: FAMILY MEDICINE

## 2024-01-25 RX ORDER — ALENDRONATE SODIUM 70 MG/1
70 TABLET ORAL
Qty: 12 TABLET | Refills: 3 | Status: SHIPPED | OUTPATIENT
Start: 2024-01-25

## 2024-01-25 RX ORDER — ALIROCUMAB 75 MG/ML
75 INJECTION, SOLUTION SUBCUTANEOUS
Qty: 2 ML | Refills: 11 | Status: SHIPPED | OUTPATIENT
Start: 2024-01-25

## 2024-01-25 ASSESSMENT — PATIENT HEALTH QUESTIONNAIRE - PHQ9: CLINICAL INTERPRETATION OF PHQ2 SCORE: 0

## 2024-01-25 ASSESSMENT — FIBROSIS 4 INDEX: FIB4 SCORE: 1.37

## 2024-01-25 NOTE — PROGRESS NOTES
"CHIEF COMPLAINT / REASON FOR VISIT  Rony Chilel is a 68 y.o. female that presents today for   Chief Complaint   Patient presents with    Lab Results     HISTORY OF PRESENT ILLNESS  Reports abdominal pains and nausea as adverse reaction to Repatha. This occurs for approximately 10 days after each injection, resolving for a couple of days prior to her next injection      Drinks approximately 2-3 quarts of water per day      DEXA 8 years ago was osteopenia. DEXA on 1/3/24 showed osteoporosis.     OBJECTIVE     /64 (BP Location: Left arm, Patient Position: Sitting, BP Cuff Size: Adult)   Pulse 72   Temp 35.9 °C (96.7 °F) (Temporal)   Resp 14   Ht 1.651 m (5' 5\")   Wt 73 kg (161 lb)   SpO2 96%   BMI 26.79 kg/m²      PHYSICAL EXAM  Constitutional: Sitting comfortably, in no acute distress, responds to questions appropriately.  Skin: Warm and dry, no rashes or lesions on face or exposed upper extremities    ASSESSMENT & PLAN  1. S/P CABG x 3  2. Coronary artery disease involving native coronary artery of native heart without angina pectoris  3. Statin intolerance  4. Primary hypertension  5. Hypercholesterolemia  STEMI May 2022 with coronary artery stent placement x2, followed by CABG x3 in June 2022.  Denies angina. Currently taking aspirin 81 mg daily, metoprolol tartrate 25 mg twice daily, losartan 50 mg daily.  She does not tolerate statin due to memory/cognitive impairment, and did not tolerate evolocumab 140 mg injections due to abdominal pain and nausea.  We will switch to alirocumab 75 mg every 2 weeks. Recheck lipids in 3 months  - Alirocumab (PRALUENT) 75 MG/ML Solution Auto-injector; Inject 75 mg under the skin every 14 days.  Dispense: 2 mL; Refill: 11  - APOLIPOPROTEIN B; Future  - Lipid Profile; Future    6. Need for vaccination  - INFLUENZA VACCINE, HIGH DOSE (65+ ONLY)    7. Vitamin D deficiency  After seeing her labs she started taking vitamin D3 2000 units daily.  Recommended continuing " this long-term    8. Hyponatremia  Chronic for at least 9 years, stable. Obtain labs for further workup. Drinks 64-96 oz fluids per day  - OSMOLALITY URINE; Future  - OSMOLALITY SERUM; Future  - Basic Metabolic Panel; Future  - TSH WITH REFLEX TO FT4; Future    9. Age-related osteoporosis without current pathological fracture  New diagnosis, DEXA 1/3/24 showed osteoporosis of lumbar spine (T score -2.9) and left femur (T score -2.5). After discussion decided to initiate alendronate 70 mg weekly. Recheck DEXA in 2-3 years. Tentatively plan for 5 year course  - alendronate (FOSAMAX) 70 MG Tab; Take 1 Tablet by mouth every 7 days.  Dispense: 12 Tablet; Refill: 3    Follow up in 3 months, after repeat labs    HCC Gap Form    Diagnosis to address: I70.0 - Arteriosclerosis of aorta (HCC)  Assessment and plan: Chronic, stable.  Incidental finding on imaging continue with current defined treatment plan: Continue strict control of hypercholesterolemia and hypertension. Follow-up at least annually.  Diagnosis: F10.21 - Alcohol dependence in remission (HCC)  Assessment and plan: Chronic, stable.  Self identifies as alcoholic but has been sober for many years.  Follow-up at least annually.  Diagnosis: F39 - Mood disorder (HCC)  Assessment and plan: Chronic, stable. Continue with current defined treatment plan: Citalopram 40 mg daily. Follow-up at least annually.  Last edited 01/25/24 12:32 PST by Braeden Hall M.D.

## 2024-01-26 ENCOUNTER — PHYSICAL THERAPY (OUTPATIENT)
Dept: PHYSICAL THERAPY | Facility: REHABILITATION | Age: 69
End: 2024-01-26
Attending: FAMILY MEDICINE
Payer: MEDICARE

## 2024-01-26 DIAGNOSIS — R53.1 WEAKNESS GENERALIZED: ICD-10-CM

## 2024-01-26 DIAGNOSIS — R29.6 RECURRENT FALLS: ICD-10-CM

## 2024-01-26 PROCEDURE — 97110 THERAPEUTIC EXERCISES: CPT

## 2024-01-26 PROCEDURE — 97112 NEUROMUSCULAR REEDUCATION: CPT

## 2024-01-26 NOTE — OP THERAPY DAILY TREATMENT
"  Outpatient Physical Therapy  DAILY TREATMENT     [facility] Physical [address].  [address]  [place] [address]  Phone:  [phone]  Fax:  [phone]    Date: 01/26/2024    Patient: Rony Chilel  YOB: 1955  MRN: [id number]     Time Calculation    Start time: 1430  Stop time: 1515 Time Calculation (min): 45 minutes         Chief Complaint: Weakness, Loss Of Balance, and Difficulty Walking    Visit #: 6    SUBJECTIVE:  Patient presents to PT session and reports she has some mid back pain today.     OBJECTIVE:  Current objective measures:           Therapeutic Exercises (CPT 87205):     1. 6MWT, 370 meters (538 m is norm for age) ; 77 HR; 96% O2 after, 1/5    2. DGI, 17/24, 1/5    3. // bars:    4. air ex pad, eyes open; eyes closed; horizontal head turns; with eyes closed, demonstrates minimal sway    5. air ex pad, eyes open; eyes closed; vertical head turns;mod sway with eyes closed    6. tucker disc, 3x15 seconds without UE support; sway in all directions; tolerates with intermittent tactile cues for increased time; demonstrates good ankle strategies    7. step over, tall hurdles, x~10 lengths; B LE lead; demonstrates L LE \"catching\" x1, able to self recover; with increased awareness, improved stability is noted and patient is able to clear B LE; constant, light tactile cues on // bars utilized throughout task, nt    8. air ex pad with ball toss, demonstrates catching ball near edge of TRACIE; tosses ball back; 5' distances, no LOB, nt    11. NuStep, x13 minutes, level 2/3, SPM: 75 or above    12. standing hip abduction, x25, B, pink theraband    13. standing hip extension, x25, B, pink theraband    14. standing hip flexion, x25, B, pink theraband    15. standing heel raises, x20, nt    16. Mini Squats, x15; requires max demo and verbal cues; mod tactile cues for improved form; utilized chair for chair taps which improved form, no pain, fatigue    20. PN due on 2/2/24      " "Therapeutic Exercise Summary: Access Code: [email or web address]  URL: [email or web address]  Date: 01/05/2024  Prepared by: Cami Bulesly    Exercises  - Corner Balance Feet Apart: Eyes Open With Head Turns  - 1 x daily - 1 sets - 10 reps    Access Code: [email or web address]  URL: [email or web address]  Date: 01/10/2024  Prepared by: Cami Buening    Exercises  - Standing Hip Abduction with Counter Support  - 1 x daily - 1 sets - 10 reps  - Standing Hip Flexion with Counter Support  - 1 x daily - 1 sets - 10 reps  - Standing Hip Extension with Counter Support  - 1 x daily - 1 sets - 10 reps    Therapeutic Treatments and Modalities:     1. Neuromuscular Re-education (CPT 35164), as noted above; tucker disc and air ex activity, x10 minutes, clock yourself geri, fatigues after 2 minutes but tolerates up to 4 minutes, 30 seconds; 50 SPM; x1 near LOB stepping back to \"7\", nt, 6\" step overs; utilized non peferred LE; requires x1 UE support for increased safety during task; with preferred LE utilization; does not require UE support, nt    Time-based treatments/modalities:    Physical Therapy Timed Treatment Charges  Neuromusc re-ed, balance, coor, post minutes (CPT 66888): 10 minutes  Therapeutic exercise minutes (CPT 95924): 35 minutes      Pain rating (1-10) before treatment:  3; mid back pain  Pain rating (1-10) after treatment:  0; reports improved pain after activity    ASSESSMENT:   Response to treatment: Patient tolerates PT treatment well today. Patient continues with strength, endurance, and balance training without increased pain. Demonstrates improving dynamic balance with tucker disc and air ex pad head turns. B LE strengthening is also improving, noted with ability to complete increased repetitions without break. At this time, patient will benefit from further skilled PT for increased functional mobility skills and quality of life.     PLAN/RECOMMENDATIONS:   Plan for treatment: therapy treatment to continue next " visit.  Planned interventions for next visit: continue with current treatment.

## 2024-01-30 ENCOUNTER — OFFICE VISIT (OUTPATIENT)
Dept: CARDIOLOGY | Facility: MEDICAL CENTER | Age: 69
End: 2024-01-30
Attending: FAMILY MEDICINE
Payer: MEDICARE

## 2024-01-30 VITALS
BODY MASS INDEX: 27.66 KG/M2 | SYSTOLIC BLOOD PRESSURE: 132 MMHG | HEIGHT: 65 IN | HEART RATE: 71 BPM | OXYGEN SATURATION: 97 % | DIASTOLIC BLOOD PRESSURE: 78 MMHG | WEIGHT: 166 LBS

## 2024-01-30 DIAGNOSIS — I70.0 ARTERIOSCLEROSIS OF AORTA (HCC): ICD-10-CM

## 2024-01-30 DIAGNOSIS — E78.2 MIXED HYPERLIPIDEMIA: ICD-10-CM

## 2024-01-30 DIAGNOSIS — Z78.9 STATIN INTOLERANCE: ICD-10-CM

## 2024-01-30 DIAGNOSIS — I25.10 CORONARY ARTERY DISEASE INVOLVING NATIVE CORONARY ARTERY OF NATIVE HEART WITHOUT ANGINA PECTORIS: ICD-10-CM

## 2024-01-30 DIAGNOSIS — Z95.1 S/P CABG X 3: ICD-10-CM

## 2024-01-30 DIAGNOSIS — I10 PRIMARY HYPERTENSION: ICD-10-CM

## 2024-01-30 LAB — EKG IMPRESSION: NORMAL

## 2024-01-30 PROCEDURE — 3078F DIAST BP <80 MM HG: CPT | Performed by: INTERNAL MEDICINE

## 2024-01-30 PROCEDURE — 93010 ELECTROCARDIOGRAM REPORT: CPT | Performed by: INTERNAL MEDICINE

## 2024-01-30 PROCEDURE — 99212 OFFICE O/P EST SF 10 MIN: CPT | Performed by: INTERNAL MEDICINE

## 2024-01-30 PROCEDURE — 93005 ELECTROCARDIOGRAM TRACING: CPT | Performed by: INTERNAL MEDICINE

## 2024-01-30 PROCEDURE — 99204 OFFICE O/P NEW MOD 45 MIN: CPT | Mod: 25 | Performed by: INTERNAL MEDICINE

## 2024-01-30 PROCEDURE — 3075F SYST BP GE 130 - 139MM HG: CPT | Performed by: INTERNAL MEDICINE

## 2024-01-30 ASSESSMENT — FIBROSIS 4 INDEX: FIB4 SCORE: 1.37

## 2024-01-30 NOTE — PROGRESS NOTES
Cardiology Initial Consultation Note    Date of note:    1/30/2024    Primary Care Provider: Braeden Hall M.D.  Referring Provider: Braeden Hall M.D.    Patient Name: Rony Chilel   YOB: 1955  MRN:              [id number]    Chief Complaint   Patient presents with    Coronary Artery Bypass Graft (CABG)    Coronary Artery Disease     F/V Dx: Coronary artery disease involving native coronary artery of native heart without angina pectoris      Hyperlipidemia       History of Present Illness: Ms. Rony Chilel is a 68-year-old woman with past medical history significant for coronary artery disease status post PCI to RCA, severe multivessel CAD status post CABG x 3, hypertension, hyperlipidemia with intolerance to statins presents to the cardiology office to establish care.    History dates back to 5/11/2022 when she was admitted to [facility] with chest pain.  Found to have inferior STEMI and taken to the Cath Lab.  Noted to have under percent occlusion of proximal RCA and 80% distal occlusion of RCA that was treated with ALCON.  Had residual obstructive disease involving LAD and LCx.  Underwent CABG x 3 (LIMA to LAD, SVG to D1, SVG to OM1).Previously following with the [facility].     It appears that she was previously on statin therapy but due to intolerance this was discontinued.  According to the patient, she had cognitive impairment while on statin therapy.  As such this was switched over to Repatha.  Noticed improvement in cognitive impairment. However, developed flu like symptoms a day after Repatha injection. Also had had abdominal pain with nausea. Would occur for about 7-10 days before resolving. She was subsequently switched to Praluent.  Has not experienced any major side effects with Praluent injections.    Today, she has no major cardiac complaints.  No complaints of lower extremity edema, orthopnea or syncope.  No dyspnea or chest pain.  Of note,  she has been making several lifestyle modifications over the past year.  She tells me that she has lost about 40 lbs with lifestyle modifications over the past 1.5 years.    Cardiovascular Risk Factors:  1. Smoking status: Former smoker. Quit in 2022  2. Type II Diabetes Mellitus: Denies   Lab Results   Component Value Date/Time    HBA1C 5.1 01/09/2024 10:21 AM    HBA1C 5.4 06/28/2022 12:16 PM     3. Hypertension: Yes  4. Dyslipidemia: Yes   Cholesterol,Tot   Date Value Ref Range Status   01/09/2024 121 100 - 199 mg/dL Final     LDL   Date Value Ref Range Status   01/09/2024 43 <100 mg/dL Final     HDL   Date Value Ref Range Status   01/09/2024 47 >=40 mg/dL Final     Triglycerides   Date Value Ref Range Status   01/09/2024 155 (H) 0 - 149 mg/dL Final     5. Family history of early Coronary Artery Disease in a first degree relative (Male less than 55 years of age; Female less than 65 years of age): Denies      Review of Systems:  As per HPI    Past Medical History:   Diagnosis Date    CAD (coronary artery disease)     Hyperlipidemia     Hypertension     PONV (postoperative nausea and vomiting)     Psychiatric disorder     Depression, PTSD, anxiety         Past Surgical History:   Procedure Laterality Date    MULTIPLE CORONARY ARTERY BYPASS ENDO VEIN HARVEST  6/30/2022    Procedure: CABG, WITH ENDOSCOPIC VEIN PROCUREMENT-X3;  Surgeon: Irvin Lama D.O.;  Location: SURGERY [facility];  Service: Cardiothoracic    ECHOCARDIOGRAM, TRANSESOPHAGEAL, INTRAOPERATIVE  6/30/2022    Procedure: ECHOCARDIOGRAM, TRANSESOPHAGEAL, INTRAOPERATIVE;  Surgeon: Irvin Lama D.O.;  Location: SURGERY [facility];  Service: Cardiothoracic    CHOLECYSTECTOMY      Open    MAMMOPLASTY AUGMENTATION Bilateral          Current Outpatient Medications   Medication Sig Dispense Refill    Alirocumab (PRALUENT) 75 MG/ML Solution Auto-injector Inject 75 mg under the skin every 14 days. 2 mL 11    alendronate (FOSAMAX) 70 MG Tab Take  1 Tablet by mouth every 7 days. 12 Tablet 3    metoprolol SR (TOPROL XL) 25 MG TABLET SR 24 HR Take 1 Tablet by mouth every day. 100 Tablet 3    olmesartan (BENICAR) 20 MG Tab Take 1 Tablet by mouth every day. 100 Tablet 3    citalopram (CELEXA) 40 MG Tab Take 1 Tablet by mouth every day. 100 Tablet 3    nitroglycerin (NITROSTAT) 0.4 MG SL Tab Place 1 Tablet under the tongue one time as needed for Chest Pain for up to 1 dose. 12 Tablet 3    Loperamide HCl (IMODIUM PO) Take  by mouth.      diphenhydrAMINE (BENADRYL) 25 MG Tab Take 25 mg by mouth at bedtime as needed for Sleep.      aspirin 81 MG EC tablet Take 81 mg by mouth every day.      acetaminophen (TYLENOL) 500 MG Tab Take 500 mg by mouth every 6 hours as needed.       No current facility-administered medications for this visit.         Allergies   Allergen Reactions    Oxycodone Itching    Brilinta [Ticagrelor]     Dilaudid [Hydromorphone] Itching    Morphine Vomiting and Nausea         History reviewed. No pertinent family history.      Social History     Socioeconomic History    Marital status:      Spouse name: Not on file    Number of children: Not on file    Years of education: Not on file    Highest education level: Not on file   Occupational History    Not on file   Tobacco Use    Smoking status: Former     Current packs/day: 0.00     Average packs/day: 0.5 packs/day for 15.0 years (7.5 ttl pk-yrs)     Types: Cigarettes     Start date: 2007     Quit date: 2022     Years since quittin.6    Smokeless tobacco: Never   Vaping Use    Vaping Use: Never used   Substance and Sexual Activity    Alcohol use: No     Comment: social    Drug use: Yes     Comment: edibles, marijuana    Sexual activity: Not on file   Other Topics Concern    Not on file   Social History Narrative    Not on file     Social Determinants of Health     Financial Resource Strain: Not on file   Food Insecurity: Not on file   Transportation Needs: Not on file   Physical  "Activity: Not on file   Stress: Not on file   Social Connections: Not on file   Intimate Partner Violence: Not on file   Housing Stability: Not on file         Physical Exam:  Ambulatory Vitals  /78 (BP Location: Left arm, Patient Position: Sitting)   Pulse 71   Ht 1.651 m (5' 5\")   Wt 75.3 kg (166 lb)   SpO2 97%    Oxygen Therapy:  Pulse Oximetry: 97 %  BP Readings from Last 4 Encounters:   01/30/24 132/78   01/25/24 118/64   11/22/23 132/62   11/02/22 124/80       Weight/BMI: Body mass index is 27.62 kg/m².  Wt Readings from Last 4 Encounters:   01/30/24 75.3 kg (166 lb)   01/25/24 73 kg (161 lb)   11/22/23 74.6 kg (164 lb 8 oz)   11/02/22 69.9 kg (154 lb)         General: Not in acute distress  HEENT: OP clear   Neck:  No carotid bruits, No JVD appreciated  CVS:  RRR, Normal S1, S2. No murmurs, rubs or gallops  Resp: Normal respiratory effort, lungs CTA bilaterally. No rales or rhonchi  Abdomen: Soft, non-distended, non-tender to palpation  Skin: No obvious rashes, no cyanosis  Neurological: Alert and oriented x3, moves all extremities, no focal neurologic deficits  Psychiatric: Appropriate affect  Extremities:   Extremities warm, 2+ bilateral radial pulses.  2+ bilateral dp pulses, no lower extremity edema bilaterally      Lab Data Review:  Lab Results   Component Value Date/Time    CHOLSTRLTOT 121 01/09/2024 10:21 AM    LDL 43 01/09/2024 10:21 AM    HDL 47 01/09/2024 10:21 AM    TRIGLYCERIDE 155 (H) 01/09/2024 10:21 AM       Lab Results   Component Value Date/Time    SODIUM 130 (L) 01/09/2024 10:21 AM    POTASSIUM 4.8 01/09/2024 10:21 AM    CHLORIDE 94 (L) 01/09/2024 10:21 AM    CO2 24 01/09/2024 10:21 AM    GLUCOSE 91 01/09/2024 10:21 AM    BUN 13 01/09/2024 10:21 AM    CREATININE 0.74 01/09/2024 10:21 AM    BUNCREATRAT 20.0 05/13/2022 04:30 AM     Lab Results   Component Value Date/Time    ALKPHOSPHAT 101 (H) 01/09/2024 10:21 AM    ASTSGOT 22 01/09/2024 10:21 AM    ALTSGPT 13 01/09/2024 10:21 AM    " TBILIRUBIN 0.6 01/09/2024 10:21 AM      Lab Results   Component Value Date/Time    WBC 6.0 01/09/2024 10:21 AM    HEMOGLOBIN 14.3 01/09/2024 10:21 AM     Lab Results   Component Value Date/Time    HBA1C 5.1 01/09/2024 10:21 AM    HBA1C 5.4 06/28/2022 12:16 PM         Cardiac Imaging and Procedures Review:    EKG dated 1/30/2024:   My personal interpretation is sinus, nonspecific ST-T changes    TTE 5/13/2022:  1. The left ventricle is normal size and function. LVEF estimated at 55% by Jett's biplane method. Normal wall thickness. No segmental wall motion abnormalities seen. Grade 1 (impaired relaxation) diastolic dysfunction. No VSD or apical thrombus seen.   2. The right ventricle is normal in size and function. RVSP estimated at 27 mmHg with RAP of 3 mmHg.     Intraoperative SACHA 6/30/2022:  LVEF estimated at 70%. Mild MR. Biatrial enlargement.     Assessment & Plan     1. Coronary artery disease involving native coronary artery of native heart without angina pectoris        2. S/P CABG x 3        3. Arteriosclerosis of aorta (HCC)        4. Primary hypertension        5. Mixed hyperlipidemia  EKG      6. Statin intolerance              Medical Decision Making:  Ms. Rony Chilel is a 68-year-old woman with past medical history significant for coronary artery disease status post PCI to RCA, severe multivessel CAD status post CABG x 3, hypertension, hyperlipidemia with intolerance to statins presents to the cardiology office to establish care.    1. Coronary artery disease involving native coronary artery of native heart without angina pectoris  2. S/P CABG x 3  -Known history obstructive coronary artery disease.  Had PCI to RCA and subsequently underwent CABG x 3 with LIMA to LAD, SVG to D1 and SVG to OM1.  She remains stable from a cardiovascular standpoint.  No exertional symptoms.  -Review of her last lipid panel which was performed earlier this month shows excellent control of her LDL.  LDL is currently at 43.   Continue PCSK9 inhibitor with Praluent.  -Continue antianginal/beta-blocker therapy with metoprolol succinate 25 mg daily.    3. Arteriosclerosis of aorta (HCC)  -Intolerant to statin.  Continue Praluent therapy for cardiovascular risk factor reduction.    4. Primary hypertension  -Blood pressures currently acceptable.  No changes to BP medications at this time.  Continue metoprolol and olmesartan    5. Mixed hyperlipidemia  6. Statin intolerance  -Apparently had intolerance to statins due to cognitive impairment.  Symptoms resolved after discontinuation of statins.  Was subsequently started on Repatha which she developed flulike symptoms and abdominal pain/nausea following each injection.  Symptoms have since resolved now that she has been switched over to Praluent.  Continue Praluent therapy.  Repeat lipid panel in 1 year.  -Discussed lifestyle modifications including exercise and dietary changes.  Recommend at least 150 minutes of moderate intensity exercise or 75 minutes of vigorous aerobic activity on a weekly basis.  Recommend dietary changes including incorporating heart healthy, AHA/Mediterranean diet.      It was a pleasure seeing Ms. Rony Chilel in the office today. Return in about 6 months (around 7/30/2024). Patient is aware to call the cardiology clinic with any questions or concerns.      Israel cMkeon MD, [facility]  Cardiologist, [facility]  [facility], [facility]  [address] [address]  Phone: [phone]  Fax: [phone]    Please note that this dictation was created using voice recognition software. I have made every reasonable attempt to correct obvious errors, but it is possible there are errors of grammar and possibly content that I did not discover before finalizing the note.

## 2024-01-31 ENCOUNTER — PHYSICAL THERAPY (OUTPATIENT)
Dept: PHYSICAL THERAPY | Facility: REHABILITATION | Age: 69
End: 2024-01-31
Attending: FAMILY MEDICINE
Payer: MEDICARE

## 2024-01-31 DIAGNOSIS — R53.1 WEAKNESS GENERALIZED: ICD-10-CM

## 2024-01-31 DIAGNOSIS — R29.6 RECURRENT FALLS: ICD-10-CM

## 2024-01-31 PROCEDURE — 97110 THERAPEUTIC EXERCISES: CPT

## 2024-01-31 PROCEDURE — 97112 NEUROMUSCULAR REEDUCATION: CPT

## 2024-01-31 ASSESSMENT — GAIT ASSESSMENTS
STEP OVER OBSTACLE: NORMAL: IS ABLE TO STEP OVER THE BOX WITHOUT CHANGING GAIT SPEED, NO EVIDENCE OF IMBALANCE
DYNAMIC GAIT INDEX SCORE: 79.17
GAIT AND PIVOT TURN: MODERATE IMPAIRMENT: TURNS SLOWLY, REQUIRES VERBAL CUEING, REQUIRES SEVERAL SMALL STEPS TO CATCH BALANCE FOLLOWING TURN AND STOP
GAIT LEVEL SURFACE: NORMAL: WALKS 20', NO ASSISTIVE DEVICES, GOOD SPEED, NO EVIDENCE FOR IMBALANCE, NORMAL GAIT PATTERN
GAIT WITH VERTICAL HEAD TURNS: NORMAL: PERFORMS HEAD TURNS SMOOTHLY WITH NO CHANGE IN GAIT
STEPS: MODERATE IMPAIRMENT: TWO FEET TO A STAIR, MUST USE RAIL
STEP AROUND OBSTACLES: NORMAL: IS ABLE TO WALK AROUND CONES SAFELY WITHOUT CHANGING GAIT SPEED, NO EVIDENCE OF IMBALANCE
GAIT WITH HORIZONTAL HEAD TURNS: MILD IMPAIRMENT: PERFORMS HEAD TURNS SMOOTHLY WITH SLIGHT CHANGE IN GAIT VELOCITY, IE, MINOR DISRUPTION TO SMOOTH GAIT PATH OR USES WALKING AID
CHANGE IN GAIT SPEED: NORMAL: ABLE TO SMOOTHLY CHANGE WALKING SPEED WITHOUT LOSS OF BALANCE OR GAIT DEVIATION. SHOWS A SIGNIFICANT DIFFERENCE IN WALKING SPEEDS BETWEEN NORMAL, FAST AND SLOW SPEEDS

## 2024-01-31 NOTE — OP THERAPY DAILY TREATMENT
"  Outpatient Physical Therapy  DAILY TREATMENT     [facility] Physical [address].  [address]  [place] [address]  Phone:  [phone]  Fax:  [phone]    Date: 01/31/2024    Patient: Rony Chilel  YOB: 1955  MRN: [id number]     Time Calculation    Start time: 1019  Stop time: 1102 Time Calculation (min): 43 minutes         Chief Complaint: Weakness, Loss Of Balance, and Difficulty Walking    Visit #: 7    SUBJECTIVE:  Patient presents to PT session and reports she feels pretty good today. She reports she has a slight headache due to not sleeping well last night as he had a good friend pass away yesterday. She reports she walked a mile and a half yesterday as well.     OBJECTIVE:  Current objective measures:     Lower extremity (left):     Hip flexion: 4-    Hip abduction: 5    Hip adduction: 5    Knee flexion: 4+    Knee extension: 5    Ankle dorsiflexion: 5    Ankle plantar flexion: 5  Lower extremity (right):     Hip flexion: 4    Hip abduction: 5    Hip adduction: 5    Knee flexion: 5    Knee extension: 4+    Ankle dorsiflexion: 5    Ankle plantar flexion: 5    5x sit<>stand: 14.13 seconds, no UE support     6MWT: 463.9 m; 1522'     PT Functional Assessment Tool Used: ABC scale  PT Functional Assessment Score: 88.75  Dynamic Gait Index Total Score: 79.17       Therapeutic Exercises (CPT 33984):     1. 6MWT, 463.9 meters (538 m is norm for age) no SOB, 1/31    2. DGI, 19/24, 1/31    3. // bars:    4. air ex pad, eyes open; eyes closed; horizontal head turns; with eyes closed, demonstrates minimal sway, nt    5. air ex pad, eyes open; eyes closed; vertical head turns;mod sway with eyes closed, nt    6. tucker disc, 3x15 seconds without UE support; sway in all directions; tolerates with intermittent tactile cues for increased time; demonstrates good ankle strategies, nt    7. step over, tall hurdles, x~10 lengths; B LE lead; demonstrates L LE \"catching\" x1, able to self " recover; with increased awareness, improved stability is noted and patient is able to clear B LE; constant, light tactile cues on // bars utilized throughout task, nt    8. air ex pad with ball toss, demonstrates catching ball near edge of TRACIE; tosses ball back; 5' distances, no LOB, nt    11. NuStep, x13 minutes, level 3, SPM: 75 or above    12. standing hip abduction, x25, B, pink theraband, nt    13. standing hip extension, x25, B, pink theraband, nt    14. standing hip flexion, x25, B, pink theraband, nt    15. standing heel raises, x20, nt    16. Mini Squats, x15; requires max demo and verbal cues; mod tactile cues for improved form; utilized chair for chair taps which improved form, nt    19. 5x sit<>stand, 14.13 seconds, no UE support; 1/31    20. PN due on 2/2/24      Therapeutic Exercise Summary: Access Code: [email or web address]  URL: [email or web address]  Date: 01/05/2024  Prepared by: Cami Buening    Exercises  - Corner Balance Feet Apart: Eyes Open With Head Turns  - 1 x daily - 1 sets - 10 reps    Access Code: [email or web address]  URL: [email or web address]  Date: 01/10/2024  Prepared by: Cami Buening    Exercises  - Standing Hip Abduction with Counter Support  - 1 x daily - 1 sets - 10 reps  - Standing Hip Flexion with Counter Support  - 1 x daily - 1 sets - 10 reps  - Standing Hip Extension with Counter Support  - 1 x daily - 1 sets - 10 reps    Therapeutic Treatments and Modalities:     1. Neuromuscular Re-education (CPT 78983), DGI, 6MWT, x15 minutes    Time-based treatments/modalities:    Physical Therapy Timed Treatment Charges  Neuromusc re-ed, balance, coor, post minutes (CPT 65587): 15 minutes  Therapeutic exercise minutes (CPT 98458): 28 minutes      Pain rating (1-10) before treatment: 2; headache; reports she didn't sleep well last night  Pain rating (1-10) after treatment:  no pain reported on exit    Goals:   Short Term Goals:   1. Patient will demonstrate independent HEP to promote  increased strength and balance for improved functional activity tolerance and mobility skills.-progressing well      2. Patient will demonstrate improved B LE strength to grossly 4+/5 to promote improved functional mobility skills and increased safety.-progressing     3. Patient will complete 6MWT to assess functional activity tolerance secondary to report of frequent fatigue with community activities. -met     Short term goal time span:  4-6 weeks      Long Term Goals:    1. Patient will demonstrate improved 6MWT, as appropriate, to indicate increased functional activity tolerance.- met, update to improved DGI      2. Patient will complete 5x sit<>stand, without UE support, in 14 seconds or less to promote improved strength. -progressing, 14.13 seconds     3. Patient will report decreased fatigue after participation in preferred community activities to promote increased quality of life. -progressing, reports fatigue is improving      4. Patient will report improved ABC scale to indicate increased confidence with balance and functional mobility skills. -progressing    Long term goal time span:  2-4 months    ASSESSMENT:   Response to treatment: Patient tolerates PT treatment well today. Patient demonstrates improving functional activity tolerance and B LE strength and balance with activities, including small increase to DGI and improved 6MWT. At this time, patient will benefit from continued skilled PT to promote further strength and balance for improved quality of life.      PLAN/RECOMMENDATIONS:   Plan for treatment: therapy treatment to continue next visit.  Planned interventions for next visit: continue with current treatment.

## 2024-01-31 NOTE — OP THERAPY PROGRESS SUMMARY
Outpatient Physical Therapy  PROGRESS SUMMARY NOTE      [facility] Physical Therapy [place] [address]  [address]  [place] [address]  Phone:  [phone]  Fax:  [phone]    Date of Visit: 01/31/2024    Patient: Rony Chilel  YOB: 1955  MRN: [id number]     Referring Provider: Braeden Hall M.D.  [facility] [facility]  [address]  [place] [address]   Referring Diagnosis Weakness [R53.1];Repeated falls [R29.6]     Visit Diagnoses     ICD-10-CM   1. Weakness generalized  R53.1   2. Recurrent falls  R29.6       Rehab Potential: good    Progress Report Period: 1/2/24-1/31/24    Functional Assessment Used  PT Functional Assessment Tool Used: ABC scale  PT Functional Assessment Score: 88.75  Dynamic Gait Index Total Score: 79.17       Objective Findings and Assessment:   Patient progression towards goals: Goals:   Short Term Goals:   1. Patient will demonstrate independent HEP to promote increased strength and balance for improved functional activity tolerance and mobility skills.-progressing well      2. Patient will demonstrate improved B LE strength to grossly 4+/5 to promote improved functional mobility skills and increased safety.-progressing     3. Patient will complete 6MWT to assess functional activity tolerance secondary to report of frequent fatigue with community activities. -met     Short term goal time span:  4-6 weeks      Long Term Goals:    1. Patient will demonstrate improved 6MWT, as appropriate, to indicate increased functional activity tolerance.- met, update to improved DGI      2. Patient will complete 5x sit<>stand, without UE support, in 14 seconds or less to promote improved strength. -progressing, 14.13 seconds     3. Patient will report decreased fatigue after participation in preferred community activities to promote increased quality of life. -progressing, reports fatigue is improving      4. Patient will report improved ABC scale to indicate increased  confidence with balance and functional mobility skills. -progressing    Long term goal time span:  2-4 months    Objective findings and assessment details:   Current objective measures:      Lower extremity (left):     Hip flexion: 4-    Hip abduction: 5    Hip adduction: 5    Knee flexion: 4+    Knee extension: 5    Ankle dorsiflexion: 5    Ankle plantar flexion: 5  Lower extremity (right):     Hip flexion: 4    Hip abduction: 5    Hip adduction: 5    Knee flexion: 5    Knee extension: 4+    Ankle dorsiflexion: 5    Ankle plantar flexion: 5     5x sit<>stand: 14.13 seconds, no UE support      6MWT: 463.9 m; 1522'      PT Functional Assessment Tool Used: ABC scale  PT Functional Assessment Score: 88.75  Dynamic Gait Index Total Score: 79.17       ASSESSMENT:   Response to treatment: Patient tolerates PT treatment well today. Patient demonstrates improving functional activity tolerance and B LE strength and balance with activities, including small increase to DGI and improved 6MWT. At this time, patient will benefit from continued skilled PT to promote further strength and balance for improved quality of life      Goals:   Short Term Goals:   1. Patient will demonstrate independent HEP to promote increased strength and balance for improved functional activity tolerance and mobility skills.     2. Patient will demonstrate improved B LE strength to grossly 4+/5 to promote improved functional mobility skills and increased safety    Short term goal time span:  2-4 weeks      Long Term Goals:    1. Patient will demonstrate improved DGI to 21/24 to indicate decreased risk of fall.      2. Patient will complete 5x sit<>stand, without UE support, in 14 seconds or less to promote improved strength.     3. Patient will report decreased fatigue after participation in preferred community activities to promote increased quality of life.      4. Patient will report improved ABC scale to indicate increased confidence with balance  and functional mobility skills.    Long term goal time span:  6-8 weeks    Plan:   Planned therapy interventions:  Neuromuscular Re-education (CPT 34332), Manual Therapy (CPT 86443), Hot or Cold Pack Therapy (CPT 91674), E Stim Unattended (CPT 02652), Therapeutic Exercise (CPT 14087) and Therapeutic Activities (CPT 43909)  Frequency: 1-2x/week.  Duration in weeks:  8      Referring provider co-signature:  I have reviewed this plan of care and my co-signature certifies the need for services.     Certification Period: 01/31/2024 to 03/31/24    Physician Signature: ________________________________ Date: ______________

## 2024-02-21 ENCOUNTER — APPOINTMENT (OUTPATIENT)
Dept: PHYSICAL THERAPY | Facility: REHABILITATION | Age: 69
End: 2024-02-21
Attending: FAMILY MEDICINE
Payer: MEDICARE

## 2024-03-05 PROCEDURE — RXMED WILLOW AMBULATORY MEDICATION CHARGE: Performed by: FAMILY MEDICINE

## 2024-03-11 ENCOUNTER — PHYSICAL THERAPY (OUTPATIENT)
Dept: PHYSICAL THERAPY | Facility: REHABILITATION | Age: 69
End: 2024-03-11
Attending: FAMILY MEDICINE
Payer: MEDICARE

## 2024-03-11 DIAGNOSIS — R29.6 RECURRENT FALLS: ICD-10-CM

## 2024-03-11 DIAGNOSIS — R53.1 WEAKNESS GENERALIZED: ICD-10-CM

## 2024-03-11 PROCEDURE — 97110 THERAPEUTIC EXERCISES: CPT

## 2024-03-11 ASSESSMENT — GAIT ASSESSMENTS
GAIT WITH VERTICAL HEAD TURNS: MILD IMPAIRMENT: PERFORMS HEAD TURNS SMOOTHLY WITH SLIGHT CHANGE IN GAIT VELOCITY, IE, MINOR DISRUPTION TO SMOOTH GAIT PATH OR USES WALKING AID
GAIT AND PIVOT TURN: NORMAL: PIVOT TURNS SAFELY WITHIN 3 SECONDS AND STOPS QUICKLY WITH NO LOSS OF BALANCE
DYNAMIC GAIT INDEX SCORE: 83.33
STEP OVER OBSTACLE: NORMAL: IS ABLE TO STEP OVER THE BOX WITHOUT CHANGING GAIT SPEED, NO EVIDENCE OF IMBALANCE
GAIT LEVEL SURFACE: NORMAL: WALKS 20', NO ASSISTIVE DEVICES, GOOD SPEED, NO EVIDENCE FOR IMBALANCE, NORMAL GAIT PATTERN
GAIT WITH HORIZONTAL HEAD TURNS: MILD IMPAIRMENT: PERFORMS HEAD TURNS SMOOTHLY WITH SLIGHT CHANGE IN GAIT VELOCITY, IE, MINOR DISRUPTION TO SMOOTH GAIT PATH OR USES WALKING AID
STEP AROUND OBSTACLES: MILD IMPAIRMENT: IS ABLE TO STEP AROUND BOTH CONES, BUT MUST SLOW DOWN AND ADJUST STEPS TO CLEAR CONES
CHANGE IN GAIT SPEED: NORMAL: ABLE TO SMOOTHLY CHANGE WALKING SPEED WITHOUT LOSS OF BALANCE OR GAIT DEVIATION. SHOWS A SIGNIFICANT DIFFERENCE IN WALKING SPEEDS BETWEEN NORMAL, FAST AND SLOW SPEEDS
STEPS: MILD IMPAIRMENT: ALTERNATING FEET, MUST USE RAIL

## 2024-03-11 NOTE — OP THERAPY DAILY TREATMENT
Outpatient Physical Therapy  DAILY TREATMENT     [facility] Physical [address]  [address]  [place] [address]  Phone:  [phone]  Fax:  [phone]    Date: 03/11/2024    Patient: Rony Chilel  YOB: 1955  MRN: [id number]     Time Calculation    Start time: 1346  Stop time: 1428 Time Calculation (min): 42 minutes         Chief Complaint: Weakness and Fall    Visit #: 8    SUBJECTIVE:  Patient reports that she has been sick for the last 3 weeks. She reports she had a fall but she was walking into her dark house and a cat had knocked over something that she didn't see on the floor, resulting in her tripping. She reports that her L knee was bothering her, but it has gotten better with time. She reports some soreness remains when kneeling on it. She states she has spent quite a bit of time in bed the last few weeks so she knows she probably has lost some endurance.    Patient reports one of her main remaining struggles is getting off of low toilets.      OBJECTIVE:  Current objective measures:     Lower extremity (left):     Hip flexion: 4    Hip abduction: 5    Hip adduction: 5    Knee flexion: 5    Knee extension: 5    Ankle dorsiflexion: 5    Ankle plantar flexion: 5  Lower extremity (right):     Hip flexion: 4+    Hip abduction: 5    Hip adduction: 5    Knee flexion: 5    Knee extension: 5    Ankle dorsiflexion: 5    Ankle plantar flexion: 5     5x sit<>stand: 11.9 seconds, no UE support      PT Functional Assessment Tool Used: ABC scale  PT Functional Assessment Score: 90.6%    PT Functional Assessment Tool Used: ABC Scale  PT Functional Assessment Score: 90.6%  Dynamic Gait Index Total Score: 83.33  Score 20/24       Therapeutic Exercises (CPT 98561):     1. 6MWT, 463.9 meters (538 m is norm for age) no SOB, 1/31    2. DGI, 20/24, 3/11    3. // bars:    4. air ex pad, eyes open; eyes closed; horizontal head turns; with eyes closed, demonstrates minimal sway, nt     "5. air ex pad, eyes open; eyes closed; vertical head turns;mod sway with eyes closed, nt    6. tucker disc, 3x15 seconds without UE support; sway in all directions; tolerates with intermittent tactile cues for increased time; demonstrates good ankle strategies, nt    7. step over, tall hurdles, x~10 lengths; B LE lead; demonstrates L LE \"catching\" x1, able to self recover; with increased awareness, improved stability is noted and patient is able to clear B LE; constant, light tactile cues on // bars utilized throughout task, nt    8. air ex pad with ball toss, demonstrates catching ball near edge of TRACIE; tosses ball back; 5' distances, no LOB, nt    11. NuStep, x13 minutes, level 3, SPM: 75 or above; nt    12. standing hip abduction, x25, B, pink theraband, nt    13. standing hip extension, x25, B, pink theraband, nt    14. standing hip flexion, x25, B, pink theraband, nt    15. standing heel raises, x20, nt    16. Mini Squats, x15; requires max demo and verbal cues; mod tactile cues for improved form; utilized chair for chair taps which improved form, nt    17. sit<>stand on air ex pad, x10; no UE support    18. shuttle, x4C, DL x25; x3C, SL, x25, each    19. 5x sit<>stand, 14.13 seconds, no UE support; 1/31    20. PN due on 2/2/24      Therapeutic Exercise Summary: Access Code: [email or web address]  URL: [email or web address]  Date: 01/05/2024  Prepared by: Cami Lawson    Exercises  - Corner Balance Feet Apart: Eyes Open With Head Turns  - 1 x daily - 1 sets - 10 reps    Access Code: [email or web address]  URL: [email or web address]  Date: 01/10/2024  Prepared by: Cami Lawson    Exercises  - Standing Hip Abduction with Counter Support  - 1 x daily - 1 sets - 10 reps  - Standing Hip Flexion with Counter Support  - 1 x daily - 1 sets - 10 reps  - Standing Hip Extension with Counter Support  - 1 x daily - 1 sets - 10 reps    Therapeutic Treatments and Modalities:     1. Neuromuscular Re-education (CPT 76027), DGI, 6MWT, " x15 minutes    Time-based treatments/modalities:    Physical Therapy Timed Treatment Charges  Therapeutic exercise minutes (CPT 53097): 42 minutes      Pain rating (1-10) before treatment:  0  Pain rating (1-10) after treatment:  0    Goals:   Short Term Goals:   1. Patient will demonstrate independent HEP to promote increased strength and balance for improved functional activity tolerance and mobility skills.-MET, has been completing as able with being sick      2. Patient will demonstrate improved B LE strength to grossly 4+/5 to promote improved functional mobility skills and increased safety-progressing, B hip flexion deficits remain      Short term goal time span:  2-4 weeks      Long Term Goals:    1. Patient will demonstrate improved DGI to 21/24 to indicate decreased risk of fall. -progressing     2. Patient will complete 5x sit<>stand, without UE support, in 14 seconds or less to promote improved strength. -MET      3. Patient will report decreased fatigue after participation in preferred community activities to promote increased quality of life. -progressing     4. Patient will report improved ABC scale to indicate increased confidence with balance and functional mobility skills.-MET, 90.6%      Long term goal time span:  6-8 weeks    ASSESSMENT:   Response to treatment: Patient tolerates PT treatments well overall however has not been seen since previous progress note secondary to illness and scheduling. Despite limited sessions, patient continues to demonstrate progress toward most goals. Patient also reports she her biggest remaining struggle with functional mobility skills is standing from short toilet heights. At this time, patient will continue to benefit from skilled PT for further strength and balance for improved quality of life and decreased risk of falls.     PLAN/RECOMMENDATIONS:   Plan for treatment: therapy treatment to continue next visit.  Planned interventions for next visit: continue with  current treatment.

## 2024-03-13 ENCOUNTER — PHARMACY VISIT (OUTPATIENT)
Dept: PHARMACY | Facility: MEDICAL CENTER | Age: 69
End: 2024-03-13
Payer: COMMERCIAL

## 2024-03-19 ENCOUNTER — PHYSICAL THERAPY (OUTPATIENT)
Dept: PHYSICAL THERAPY | Facility: REHABILITATION | Age: 69
End: 2024-03-19
Attending: FAMILY MEDICINE
Payer: MEDICARE

## 2024-03-19 DIAGNOSIS — R29.6 RECURRENT FALLS: ICD-10-CM

## 2024-03-19 DIAGNOSIS — R53.1 WEAKNESS GENERALIZED: ICD-10-CM

## 2024-03-19 PROCEDURE — 97110 THERAPEUTIC EXERCISES: CPT

## 2024-03-19 NOTE — OP THERAPY DAILY TREATMENT
"  Outpatient Physical Therapy  DAILY TREATMENT     [facility] Physical [address].  [address]  [place] [address]  Phone:  [phone]  Fax:  [phone]    Date: 03/19/2024    Patient: Rony Chilel  YOB: 1955  MRN: [id number]     Time Calculation    Start time: 1015  Stop time: 1056 Time Calculation (min): 41 minutes         Chief Complaint: Weakness and Loss Of Balance    Visit #: 9    SUBJECTIVE:  Patient presents to PT session and reports her L shoulder is bothering her today. Patient denies new falls.     OBJECTIVE:  Current objective measures:           Therapeutic Exercises (CPT 16764):     1. 6MWT, 463.9 meters (538 m is norm for age) no SOB, 1/31    2. DGI, 20/24, 3/11    3. // bars:    4. air ex pad, eyes open; eyes closed; horizontal head turns; with eyes closed, demonstrates minimal sway, nt    5. air ex pad, eyes open; eyes closed; vertical head turns;mod sway with eyes closed, nt    6. tucker disc, 3x15 seconds without UE support; sway in all directions; tolerates with intermittent tactile cues for increased time; demonstrates good ankle strategies, nt    7. step over, tall hurdles, x~10 lengths; B LE lead; demonstrates L LE \"catching\" x1, able to self recover; with increased awareness, improved stability is noted and patient is able to clear B LE; constant, light tactile cues on // bars utilized throughout task, nt    8. air ex pad with ball toss, demonstrates catching ball near edge of TRACIE; tosses ball back; 5' distances, no LOB, nt    10. prayer stretch with swiss ball, x3 minutes    11. NuStep, x10 minutes, level 3, SPM: 75 or above    12. standing hip abduction, x20, B, pink theraband    13. standing hip extension, x20 B, pink theraband    14. standing hip flexion, x20, B, pink theraband    15. standing heel raises, x20, nt    16. Mini Squats, x15; requires max demo and verbal cues; mod tactile cues for improved form; utilized chair for chair taps which " improved form, nt    17. sit<>stand on air ex pad, x7, no UE support; x3 no UE support, x1 LOB, CGA from PT to maintain safety; overall self corrects    18. shuttle, x4C, DL x2 minutes; x2C, SL, x1 minute each    19. 5x sit<>stand, 14.13 seconds, no UE support; 1/31    20. PN due on 2/2/24      Therapeutic Exercise Summary: Access Code: [email or web address]  URL: [email or web address]  Date: 01/05/2024  Prepared by: Cami Buening    Exercises  - Corner Balance Feet Apart: Eyes Open With Head Turns  - 1 x daily - 1 sets - 10 reps    Access Code: [email or web address]  URL: [email or web address]  Date: 01/10/2024  Prepared by: Cami Buening    Exercises  - Standing Hip Abduction with Counter Support  - 1 x daily - 1 sets - 10 reps  - Standing Hip Flexion with Counter Support  - 1 x daily - 1 sets - 10 reps  - Standing Hip Extension with Counter Support  - 1 x daily - 1 sets - 10 reps    Therapeutic Treatments and Modalities:     1. Neuromuscular Re-education (CPT 50164), DGI, 6MWT, nt    Time-based treatments/modalities:    Physical Therapy Timed Treatment Charges  Therapeutic exercise minutes (CPT 74098): 41 minutes      Pain rating (1-10) before treatment:  2; L shoulder   Pain rating (1-10) after treatment:  0; L shoulder     ASSESSMENT:   Response to treatment: Patient tolerates PT treatment well today. Continued with B LE strengthening exercises, however, patient does fatigue quickly today. L LE strength deficits>R LE strength deficits. At this time, patient will benefit from continued skilled PT to promote further improved strength and balance for increased safety, functional mobility skills, and increased quality of life.     PLAN/RECOMMENDATIONS:   Plan for treatment: therapy treatment to continue next visit.  Planned interventions for next visit: continue with current treatment.

## 2024-03-21 ENCOUNTER — TELEPHONE (OUTPATIENT)
Dept: MEDICAL GROUP | Facility: PHYSICIAN GROUP | Age: 69
End: 2024-03-21

## 2024-03-21 NOTE — TELEPHONE ENCOUNTER
VOICEMAIL  1. Caller Name: Rony Chilel                        Call Back Number: [phone] (home)       2. Message: Patient is calling stating that her praulent Medication requires authorization from the clinic.    3. Patient approves office to leave a detailed voicemail/MyChart message: yes

## 2024-03-21 NOTE — TELEPHONE ENCOUNTER
Phone Number Called: [phone] (home)       Call outcome: Left detailed message for patient. Informed to call back with any additional questions.    Message: Left a voicemail informing the patient that a prior authorization has been initiated.Informed her that it would take 24-72 business days to receive a response.

## 2024-03-29 ENCOUNTER — TELEPHONE (OUTPATIENT)
Dept: HEALTH INFORMATION MANAGEMENT | Facility: OTHER | Age: 69
End: 2024-03-29
Payer: MEDICARE

## 2024-03-29 ENCOUNTER — TELEPHONE (OUTPATIENT)
Dept: SCHEDULING | Facility: IMAGING CENTER | Age: 69
End: 2024-03-29
Payer: MEDICARE

## 2024-04-01 ENCOUNTER — PHYSICAL THERAPY (OUTPATIENT)
Dept: PHYSICAL THERAPY | Facility: REHABILITATION | Age: 69
End: 2024-04-01
Attending: FAMILY MEDICINE
Payer: MEDICARE

## 2024-04-01 DIAGNOSIS — R53.1 WEAKNESS GENERALIZED: ICD-10-CM

## 2024-04-01 DIAGNOSIS — R29.6 RECURRENT FALLS: ICD-10-CM

## 2024-04-01 PROCEDURE — 97110 THERAPEUTIC EXERCISES: CPT

## 2024-04-01 PROCEDURE — 97112 NEUROMUSCULAR REEDUCATION: CPT

## 2024-04-01 ASSESSMENT — GAIT ASSESSMENTS
STEP AROUND OBSTACLES: NORMAL: IS ABLE TO WALK AROUND CONES SAFELY WITHOUT CHANGING GAIT SPEED, NO EVIDENCE OF IMBALANCE
GAIT WITH HORIZONTAL HEAD TURNS: NORMAL: PERFORMS HEAD TURNS SMOOTHLY WITH NO CHANGE IN GAIT
GAIT AND PIVOT TURN: MILD IMPAIRMENT: PIVOT TURNS SAFELY IN >3 SECONDS AND STOPS WITH NO LOSS OF BALANCE
STEPS: MILD IMPAIRMENT: ALTERNATING FEET, MUST USE RAIL
GAIT LEVEL SURFACE: NORMAL: WALKS 20', NO ASSISTIVE DEVICES, GOOD SPEED, NO EVIDENCE FOR IMBALANCE, NORMAL GAIT PATTERN
STEP OVER OBSTACLE: NORMAL: IS ABLE TO STEP OVER THE BOX WITHOUT CHANGING GAIT SPEED, NO EVIDENCE OF IMBALANCE
CHANGE IN GAIT SPEED: NORMAL: ABLE TO SMOOTHLY CHANGE WALKING SPEED WITHOUT LOSS OF BALANCE OR GAIT DEVIATION. SHOWS A SIGNIFICANT DIFFERENCE IN WALKING SPEEDS BETWEEN NORMAL, FAST AND SLOW SPEEDS
GAIT WITH VERTICAL HEAD TURNS: MILD IMPAIRMENT: PERFORMS HEAD TURNS SMOOTHLY WITH SLIGHT CHANGE IN GAIT VELOCITY, IE, MINOR DISRUPTION TO SMOOTH GAIT PATH OR USES WALKING AID
DYNAMIC GAIT INDEX SCORE: 87.5

## 2024-04-01 NOTE — OP THERAPY DISCHARGE SUMMARY
Outpatient Physical Therapy  DISCHARGE SUMMARY NOTE      [facility] Physical [address]  [address]  [place] [address]  Phone:  [phone]  Fax:  [phone]    Date of Visit: 04/01/2024    Patient: Rony Chilel  YOB: 1955  MRN: [id number]     Referring Provider: Braeden Hall M.D.  [facility] [facility] [address]  [place] [address]   Referring Diagnosis Weakness [R53.1];Repeated falls [R29.6]         Functional Assessment Used  Dynamic Gait Index Total Score: 87.5     Your patient is being discharged from Physical Therapy with the following comments:   Goals partially met  Patient reports feeling ready for D/C this date. Encouraged for continued HEP and to retrieve new referrals if further needs arise or regression occurs.     Comments:    Current objective measures:      Lower extremity (left):     Hip flexion: 4+    Hip abduction: 5    Hip adduction: 5    Knee flexion: 5    Knee extension: 5    Ankle dorsiflexion: 5    Ankle plantar flexion: 5  Lower extremity (right):     Hip flexion: 5    Hip abduction: 5    Hip adduction: 5    Knee flexion: 5    Knee extension: 5    Ankle dorsiflexion: 5    Ankle plantar flexion: 5      Dynamic Gait Index Total Score: 87.5  21/24    Goals:   Short Term Goals:   1. Patient will demonstrate independent HEP to promote increased strength and balance for improved functional activity tolerance and mobility skills.- MET; plans to get out walking      2. Patient will demonstrate improved B hip flexion strength to grossly 5/5 to promote improved functional mobility skills and increased safety- PROGRESSING WELL; L hip flexion 4+/5      Short term goal time span:  4-6 weeks      Long Term Goals:    1. Patient will demonstrate improved DGI to 21/24 to indicate decreased risk of fall. -MET     2. Patient will report that she has decreased difficulty transitioning from sit<>stand from low toilet height to allow her to continue  participating in community activities. -not met, discussed sit<>stands for HEP to continue to promote strength and ability to complete skill, patient verbalizes understanding     3. Patient will report decreased fatigue after participation in preferred community activities to promote increased quality of life. -MET     Long term goal time span:  6-8 weeks    ASSESSMENT:   Response to treatment: Patient tolerates PT treatments well overall. Upon arrival, patient reports that she feels ready for D/C from skilled PT and able to continue via independent HEP. Patient goals addressed with patient meeting x3/5 goals and progress toward strengthening goals noted. Discussed with patient continuing HEP for continued strengthening for increased safety as well as retrieving new referral if new needs arise or regression occurs. Patient verbalizes understanding. Will D/C skilled PT at this time.      Limitations Remaining:  L hip strength: 4+/5  DGI: 21/24    Recommendations:  Follow up with MD. Continue HEP. Retrieve new referral if skilled needs return. Patient verbalizes understanding.     Thank you for allowing me to participate in this patient's care.      Cami Lawson, PT    Date: 4/1/2024

## 2024-04-01 NOTE — OP THERAPY DAILY TREATMENT
Outpatient Physical Therapy  DAILY TREATMENT     [facility] [address]  [address]  [place] [address]  Phone:  [phone]  Fax:  [phone]    Date: 04/01/2024    Patient: Rony Chilel  YOB: 1955  MRN: [id number]     Time Calculation    Start time: 1430  Stop time: 1512 Time Calculation (min): 42 minutes         Chief Complaint: Weakness and Loss Of Balance    Visit #: 10    SUBJECTIVE:  Patient presents to PT session and reports that she feels like today can be her last day. She reports that she has been outside more often and plans to continue to walk and spend time outside. Patient also reports she plans to get some trekking poles to assist her with her walks as needed. Patient reports she was able to spend 3 and a half hours helping her [employer] decorate for [place]. Discussed with patient that if she feels the need to return due to regression or change in symptoms, she will require a new referral.     OBJECTIVE:  Current objective measures:     Lower extremity (left):     Hip flexion: 4+    Hip abduction: 5    Hip adduction: 5    Knee flexion: 5    Knee extension: 5    Ankle dorsiflexion: 5    Ankle plantar flexion: 5  Lower extremity (right):     Hip flexion: 5    Hip abduction: 5    Hip adduction: 5    Knee flexion: 5    Knee extension: 5    Ankle dorsiflexion: 5    Ankle plantar flexion: 5     Dynamic Gait Index Total Score: 87.5  21/24      Therapeutic Exercises (CPT 44114):     1. 6MWT, 463.9 meters (538 m is norm for age) no SOB, 1/31    2. DGI, 21/24, 4/1    3. // bars:    4. air ex pad, eyes open; eyes closed; horizontal head turns; with eyes closed, demonstrates minimal sway, nt    5. air ex pad, eyes open; eyes closed; vertical head turns;mod sway with eyes closed, nt    6. tucker disc, 3x15 seconds without UE support; sway in all directions; tolerates with intermittent tactile cues for increased time; demonstrates good ankle strategies, nt    7.  "step over, tall hurdles, x~10 lengths; B LE lead; demonstrates L LE \"catching\" x1, able to self recover; with increased awareness, improved stability is noted and patient is able to clear B LE; constant, light tactile cues on // bars utilized throughout task, nt    8. air ex pad with ball toss, demonstrates catching ball near edge of TRACIE; tosses ball back; 5' distances, no LOB, nt    10. prayer stretch with swiss ball, x3 minutes, nt    11. NuStep, x10 minutes, level 4    12. standing hip abduction, x20, B, green theraband    13. standing hip extension, x20 B, green theraband    14. standing hip flexion, x20, B, green theraband    15. seated marches, x20; B, green theraband, added to HEP    16. Mini Squats, x15; requires max demo and verbal cues; mod tactile cues for improved form; utilized chair for chair taps which improved form, nt    17. sit<>stand on air ex pad, x10, no UE support    18. shuttle, x4C, DL x2 minutes; x2C, SL, x1 minute each, nt    19. 5x sit<>stand, 14.13 seconds, no UE support; 1/31    20. PN due on 2/2/24      Therapeutic Exercise Summary: Access Code: [email or web address]  URL: [email or web address]  Date: 01/05/2024  Prepared by: Cami Buening    Exercises  - Corner Balance Feet Apart: Eyes Open With Head Turns  - 1 x daily - 1 sets - 10 reps    Access Code: [email or web address]  URL: [email or web address]  Date: 01/10/2024  Prepared by: Cami Buening    Exercises  - Standing Hip Abduction with Counter Support  - 1 x daily - 1 sets - 10 reps  - Standing Hip Flexion with Counter Support  - 1 x daily - 1 sets - 10 reps  - Standing Hip Extension with Counter Support  - 1 x daily - 1 sets - 10 reps    Therapeutic Treatments and Modalities:     1. Neuromuscular Re-education (CPT 91709), DGI, x10 minutes    Time-based treatments/modalities:    Physical Therapy Timed Treatment Charges  Neuromusc re-ed, balance, coor, post minutes (CPT 81007): 10 minutes  Therapeutic exercise minutes (CPT 29060): 32 " minutes      Pain rating (1-10) before treatment:  0  Pain rating (1-10) after treatment:  0    Goals:   Short Term Goals:   1. Patient will demonstrate independent HEP to promote increased strength and balance for improved functional activity tolerance and mobility skills.- MET; plans to get out walking      2. Patient will demonstrate improved B hip flexion strength to grossly 5/5 to promote improved functional mobility skills and increased safety- PROGRESSING WELL; L hip flexion 4+/5      Short term goal time span:  4-6 weeks      Long Term Goals:    1. Patient will demonstrate improved DGI to 21/24 to indicate decreased risk of fall. -MET     2. Patient will report that she has decreased difficulty transitioning from sit<>stand from low toilet height to allow her to continue participating in community activities. -not met, discussed sit<>stands for HEP to continue to promote strength and ability to complete skill, patient verbalizes understanding     3. Patient will report decreased fatigue after participation in preferred community activities to promote increased quality of life. -MET     Long term goal time span:  6-8 weeks    ASSESSMENT:   Response to treatment: Patient tolerates PT treatments well overall. Upon arrival, patient reports that she feels ready for D/C from skilled PT and able to continue via independent HEP. Patient goals addressed with patient meeting x3/5 goals and progress toward strengthening goals noted. Discussed with patient continuing HEP for continued strengthening for increased safety as well as retrieving new referral if new needs arise or regression occurs. Patient verbalizes understanding. Will D/C skilled PT at this time.        PLAN/RECOMMENDATIONS:   Plan for treatment: therapy treatment to continue next visit.  Planned interventions for next visit: continue with current treatment.

## 2024-04-04 ENCOUNTER — TELEPHONE (OUTPATIENT)
Dept: MEDICAL GROUP | Facility: PHYSICIAN GROUP | Age: 69
End: 2024-04-04
Payer: MEDICARE

## 2024-04-04 DIAGNOSIS — E78.00 HYPERCHOLESTEROLEMIA: ICD-10-CM

## 2024-04-04 DIAGNOSIS — I25.10 CORONARY ARTERY DISEASE INVOLVING NATIVE CORONARY ARTERY OF NATIVE HEART WITHOUT ANGINA PECTORIS: ICD-10-CM

## 2024-04-04 DIAGNOSIS — Z95.1 S/P CABG X 3: ICD-10-CM

## 2024-04-04 RX ORDER — ROSUVASTATIN CALCIUM 20 MG/1
20 TABLET, COATED ORAL EVERY EVENING
Qty: 30 TABLET | Refills: 11 | Status: SHIPPED
Start: 2024-04-04 | End: 2024-04-04

## 2024-04-04 RX ORDER — ROSUVASTATIN CALCIUM 20 MG/1
20 TABLET, COATED ORAL EVERY EVENING
Qty: 100 TABLET | Refills: 3 | Status: SHIPPED | OUTPATIENT
Start: 2024-04-04

## 2024-04-04 NOTE — TELEPHONE ENCOUNTER
VOICEMAIL  1. Caller Name: Rony Chilel                        Call Back Number: [phone] (home)       2. Message: Patient is calling regarding her  Praluent medication. She reports that the medication was denied on her insurance.  Patient believes the best thing to do is to go back on the statin prescription, and when she is not excited about it but is okay with that.    3. Patient approves office to leave a detailed voicemail/MyChart message: yes

## 2024-04-09 NOTE — TELEPHONE ENCOUNTER
Phone Number Called: [phone] (home)       Call outcome: Left detailed message for patient. Informed to call back with any additional questions.    Message: Left vm

## 2024-04-16 NOTE — TELEPHONE ENCOUNTER
"8/15/2023       RE: Tiffanie Summers  [address] [facility] [address]       Dear Colleague,    Thank you for referring your patient, Tiffanie Summers, to the [facility] [place] at [facility]. Please see a copy of my visit note below.    Plastic Surgery Outpatient Visit    ID: Tiffanie Summers is a 89 year old female with PMH non healing chest wound now s/p debridement and readvancement of pedicled LD flap 7/27/23 with Dr. Beasley. On IV antibiotics through 8/17.       S: Overall doing ok. Complains of continued itching. Taking benadryl, zyrtec and using topical calamine lotion. Itching to chest, abdomen and back since surgery. Pain manageable.     O:  BP (!) 143/87 (BP Location: Right arm, Patient Position: Sitting, Cuff Size: Adult Regular)   Pulse 82   Ht 1.575 m (5' 2\")   Wt 60.9 kg (134 lb 3.2 oz)   SpO2 100%   BMI 24.55 kg/m     General: NAD  Chest: L chest LD flap viable. Mild dermal edema to skin flap. Incision c/d/I. No erythema. Surrounding skin with calamine lotion in place, no obvious rash.     A/P:  -flap healing well  -start atarax instead of benadryl and topical triamcinolone ointment for itching  -staples removed, will leave sutures one more week  -RTC 1 week      15 minutes spent on the date of the encounter doing chart review, history and physical, dressing changes, documentation and further activity as noted above.      Again, thank you for allowing me to participate in the care of your patient.      Sincerely,    Renuka Hart PA-C    " VOICEMAIL  1. Caller Name: Rony Chilel                        Call Back Number: [phone] (home)       2. Message: Patient is calling regarding here praluent medication. Due to her LDL not being levels necessary the insurance has denied it as she does not meet the criteria.    3. Patient approves office to leave a detailed voicemail/MyChart message: yes

## 2024-04-24 ENCOUNTER — APPOINTMENT (OUTPATIENT)
Dept: PHYSICAL THERAPY | Facility: REHABILITATION | Age: 69
End: 2024-04-24
Attending: FAMILY MEDICINE
Payer: MEDICARE

## 2024-04-29 ENCOUNTER — APPOINTMENT (OUTPATIENT)
Dept: PHYSICAL THERAPY | Facility: REHABILITATION | Age: 69
End: 2024-04-29
Attending: FAMILY MEDICINE
Payer: MEDICARE

## 2024-05-01 ENCOUNTER — OFFICE VISIT (OUTPATIENT)
Dept: FAMILY PLANNING/WOMEN'S HEALTH CLINIC | Facility: PHYSICIAN GROUP | Age: 69
End: 2024-05-01
Payer: MEDICARE

## 2024-05-01 VITALS
DIASTOLIC BLOOD PRESSURE: 74 MMHG | SYSTOLIC BLOOD PRESSURE: 122 MMHG | BODY MASS INDEX: 27.49 KG/M2 | WEIGHT: 165 LBS | HEIGHT: 65 IN

## 2024-05-01 DIAGNOSIS — Z95.1 S/P CABG X 3: ICD-10-CM

## 2024-05-01 DIAGNOSIS — I25.10 CORONARY ARTERY DISEASE INVOLVING NATIVE CORONARY ARTERY OF NATIVE HEART WITHOUT ANGINA PECTORIS: ICD-10-CM

## 2024-05-01 DIAGNOSIS — F39 MOOD DISORDER (HCC): ICD-10-CM

## 2024-05-01 DIAGNOSIS — I10 PRIMARY HYPERTENSION: ICD-10-CM

## 2024-05-01 DIAGNOSIS — I70.0 ARTERIOSCLEROSIS OF AORTA (HCC): ICD-10-CM

## 2024-05-01 DIAGNOSIS — F10.21 ALCOHOL DEPENDENCE IN REMISSION (HCC): ICD-10-CM

## 2024-05-01 DIAGNOSIS — M85.80 OSTEOPENIA, UNSPECIFIED LOCATION: ICD-10-CM

## 2024-05-01 DIAGNOSIS — E78.2 MIXED HYPERLIPIDEMIA: ICD-10-CM

## 2024-05-01 PROCEDURE — 3074F SYST BP LT 130 MM HG: CPT

## 2024-05-01 PROCEDURE — 3078F DIAST BP <80 MM HG: CPT

## 2024-05-01 PROCEDURE — G0439 PPPS, SUBSEQ VISIT: HCPCS

## 2024-05-01 PROCEDURE — 1126F AMNT PAIN NOTED NONE PRSNT: CPT

## 2024-05-01 SDOH — ECONOMIC STABILITY: FOOD INSECURITY: WITHIN THE PAST 12 MONTHS, YOU WORRIED THAT YOUR FOOD WOULD RUN OUT BEFORE YOU GOT MONEY TO BUY MORE.: NEVER TRUE

## 2024-05-01 SDOH — HEALTH STABILITY: PHYSICAL HEALTH: ON AVERAGE, HOW MANY MINUTES DO YOU ENGAGE IN EXERCISE AT THIS LEVEL?: 30 MIN

## 2024-05-01 SDOH — ECONOMIC STABILITY: INCOME INSECURITY: HOW HARD IS IT FOR YOU TO PAY FOR THE VERY BASICS LIKE FOOD, HOUSING, MEDICAL CARE, AND HEATING?: NOT HARD AT ALL

## 2024-05-01 SDOH — ECONOMIC STABILITY: INCOME INSECURITY: IN THE LAST 12 MONTHS, WAS THERE A TIME WHEN YOU WERE NOT ABLE TO PAY THE MORTGAGE OR RENT ON TIME?: NO

## 2024-05-01 SDOH — ECONOMIC STABILITY: FOOD INSECURITY: WITHIN THE PAST 12 MONTHS, THE FOOD YOU BOUGHT JUST DIDN'T LAST AND YOU DIDN'T HAVE MONEY TO GET MORE.: NEVER TRUE

## 2024-05-01 SDOH — ECONOMIC STABILITY: HOUSING INSECURITY
IN THE LAST 12 MONTHS, WAS THERE A TIME WHEN YOU DID NOT HAVE A STEADY PLACE TO SLEEP OR SLEPT IN A SHELTER (INCLUDING NOW)?: NO

## 2024-05-01 SDOH — ECONOMIC STABILITY: TRANSPORTATION INSECURITY
IN THE PAST 12 MONTHS, HAS THE LACK OF TRANSPORTATION KEPT YOU FROM MEDICAL APPOINTMENTS OR FROM GETTING MEDICATIONS?: NO

## 2024-05-01 SDOH — ECONOMIC STABILITY: TRANSPORTATION INSECURITY
IN THE PAST 12 MONTHS, HAS LACK OF TRANSPORTATION KEPT YOU FROM MEETINGS, WORK, OR FROM GETTING THINGS NEEDED FOR DAILY LIVING?: NO

## 2024-05-01 SDOH — HEALTH STABILITY: PHYSICAL HEALTH: ON AVERAGE, HOW MANY DAYS PER WEEK DO YOU ENGAGE IN MODERATE TO STRENUOUS EXERCISE (LIKE A BRISK WALK)?: 3 DAYS

## 2024-05-01 ASSESSMENT — PATIENT HEALTH QUESTIONNAIRE - PHQ9: CLINICAL INTERPRETATION OF PHQ2 SCORE: 0

## 2024-05-01 ASSESSMENT — FIBROSIS 4 INDEX: FIB4 SCORE: 1.39

## 2024-05-01 ASSESSMENT — PAIN SCALES - GENERAL: PAINLEVEL: NO PAIN

## 2024-05-01 ASSESSMENT — ENCOUNTER SYMPTOMS: GENERAL WELL-BEING: GOOD

## 2024-05-01 ASSESSMENT — ACTIVITIES OF DAILY LIVING (ADL): BATHING_REQUIRES_ASSISTANCE: 0

## 2024-05-01 NOTE — ASSESSMENT & PLAN NOTE
Chronic, stable. The patient's blood pressure is well controlled with current medication. Continue with current defined treatment plan: olmesartan (BENICAR) 20 MG Tab, metoprolol SR (TOPROL XL) 25 MG TABLET SR 24 HR . Follow-up at least annually.

## 2024-05-01 NOTE — ASSESSMENT & PLAN NOTE
Chronic, stable.  PHQ-9 score today is 0.  The patient reports that her moods are getting better.  Denies SI/HI.  Continue with current defined treatment plan: citalopram (CELEXA) 40 MG Tab . Follow-up at least annually.

## 2024-05-01 NOTE — ASSESSMENT & PLAN NOTE
Chronic, stable. The patient denies any recent falls of fractures. Discussed adding weight bearing exercise such as walking for 30 minutes most of the days of the week.  Also discussed adequate calcium and vitamin D ingestion and optimal diet. Chronic, stable. Continue with current defined treatment plan: alendronate (FOSAMAX) 70 MG Tab . Follow-up at least annually.

## 2024-05-01 NOTE — ASSESSMENT & PLAN NOTE
Chronic, stable.  The patient had a surgery in June of 2022. She is being followed by cardiology [facility].  No current chest pain or shortness of breath.  The patient just graduated from cardiac rehab in April of 2024.   Continue with current defined treatment plan: metoprolol SR (TOPROL XL) 25 MG TABLET SR 24 HR, nitroglycerin (NITROSTAT) 0.4 MG SL Tab, olmesartan (BENICAR) 20 MG Tab  . Follow-up at least annually.

## 2024-05-01 NOTE — ASSESSMENT & PLAN NOTE
Chronic, stable.  The patient had a surgery in June of 2022. She is being followed by cardiology Linda.  The patient just graduated from cardiac rehab in April of 2024.   Continue with current defined treatment plan: metoprolol SR (TOPROL XL) 25 MG TABLET SR 24 HR, nitroglycerin (NITROSTAT) 0.4 MG SL Tab, olmesartan (BENICAR) 20 MG Tab  . Follow-up at least annually.

## 2024-05-01 NOTE — ASSESSMENT & PLAN NOTE
Chronic, stable.  The patient reports that she has not had any alcohol in 8 years.  She does not consume any alcohol. Follow-up at least annually.

## 2024-05-01 NOTE — PROGRESS NOTES
Comprehensive Health Assessment Program     Rony Chilel is a 69 y.o. here for her comprehensive health assessment.    Patient Active Problem List    Diagnosis Date Noted    Mixed hyperlipidemia 01/30/2024    Statin intolerance 01/30/2024    Arteriosclerosis of aorta (HCC) 11/02/2022    Osteopenia 11/02/2022    Insomnia 11/02/2022    Hyponatremia 11/02/2022    Mood disorder ([facility]) 11/02/2022    Alcohol dependence in remission ([facility]) 11/02/2022    S/P CABG x 3 06/30/2022    Coronary artery disease involving native coronary artery of native heart without angina pectoris 06/30/2022    Hypertension 05/27/2015       Current Outpatient Medications   Medication Sig Dispense Refill    rosuvastatin (CRESTOR) 20 MG Tab Take 1 Tablet by mouth every evening. 100 Tablet 3    Alirocumab (PRALUENT) 75 MG/ML Solution Auto-injector Inject 75 mg under the skin every 14 days. 2 mL 11    alendronate (FOSAMAX) 70 MG Tab Take 1 Tablet by mouth every 7 days. 12 Tablet 3    metoprolol SR (TOPROL XL) 25 MG TABLET SR 24 HR Take 1 Tablet by mouth every day. 100 Tablet 3    olmesartan (BENICAR) 20 MG Tab Take 1 Tablet by mouth every day. 100 Tablet 3    citalopram (CELEXA) 40 MG Tab Take 1 Tablet by mouth every day. 100 Tablet 3    nitroglycerin (NITROSTAT) 0.4 MG SL Tab Place 1 Tablet under the tongue one time as needed for Chest Pain for up to 1 dose. 12 Tablet 3    Loperamide HCl (IMODIUM PO) Take  by mouth.      diphenhydrAMINE (BENADRYL) 25 MG Tab Take 25 mg by mouth at bedtime as needed for Sleep.      aspirin 81 MG EC tablet Take 81 mg by mouth every day.      acetaminophen (TYLENOL) 500 MG Tab Take 500 mg by mouth every 6 hours as needed.       No current facility-administered medications for this visit.          Current supplements as per medication list.     Allergies:   Oxycodone, Brilinta [ticagrelor], Dilaudid [hydromorphone], and Morphine  Social History     Tobacco Use    Smoking status: Former     Current packs/day:  0.00     Average packs/day: 0.5 packs/day for 15.0 years (7.5 ttl pk-yrs)     Types: Cigarettes     Start date: 2007     Quit date: 2022     Years since quittin.9    Smokeless tobacco: Never   Vaping Use    Vaping Use: Never used   Substance Use Topics    Alcohol use: Not Currently     Comment: social    Drug use: Yes     Types: Marijuana     Comment: edibles, marijuana     History reviewed. No pertinent family history.  Rony  has a past medical history of CAD (coronary artery disease), Hyperlipidemia, Hypertension, PONV (postoperative nausea and vomiting), and Psychiatric disorder.   Past Surgical History:   Procedure Laterality Date    MULTIPLE CORONARY ARTERY BYPASS ENDO VEIN HARVEST  2022    Procedure: CABG, WITH ENDOSCOPIC VEIN PROCUREMENT-X3;  Surgeon: Irvin Lama D.O.;  Location: SURGERY [facility];  Service: Cardiothoracic    ECHOCARDIOGRAM, TRANSESOPHAGEAL, INTRAOPERATIVE  2022    Procedure: ECHOCARDIOGRAM, TRANSESOPHAGEAL, INTRAOPERATIVE;  Surgeon: Irvin Lama D.O.;  Location: SURGERY [facility];  Service: Cardiothoracic    CHOLECYSTECTOMY      Open    MAMMOPLASTY AUGMENTATION Bilateral        Screening:  In the last six months have you experienced any leakage of urine? No    Depression Screening  Little interest or pleasure in doing things?  0 - not at all  Feeling down, depressed , or hopeless? 0 - not at all  Patient Health Questionnaire Score: 0     If depressive symptoms identified deferred to follow up visit unless specifically addressed in assessment and plan.    Interpretation of PHQ-9 Total Score   Score Severity   1-4 No Depression   5-9 Mild Depression   10-14 Moderate Depression   15-19 Moderately Severe Depression   20-27 Severe Depression    Screening for Cognitive Impairment  Do you or any of your friends or family members have any concern about your memory? Yes  Three Minute Recall (Leader, Season, Table) 3/3    Bhavin clock face with all 12  numbers and set the hands to show 10 minutes after 11.  Yes 5  Cognitive concerns identified deferred for follow up unless specifically addressed in assessment and plan.    Fall Risk Assessment  Has the patient had two or more falls in the last year or any fall with injury in the last year?  Yes    Safety Assessment  Do you always wear your seatbelt?  Yes  Any changes to home needed to function safely? Yes  Difficulty hearing.  Yes  Patient counseled about all safety risks that were identified.    Functional Assessment ADLs  Are there any barriers preventing you from cooking for yourself or meeting nutritional needs?  No.    Are there any barriers preventing you from driving safely or obtaining transportation?  No.    Are there any barriers preventing you from using a telephone or calling for help?  No    Are there any barriers preventing you from shopping?  No.    Are there any barriers preventing you from taking care of your own finances?  No    Are there any barriers preventing you from managing your medications?  No    Are there any barriers preventing you from showering, bathing or dressing yourself? No    Are there any barriers preventing you from doing housework or laundry? No  Are there any barriers preventing you from using the toilet?No  Are you currently engaging in any exercise or physical activity?  Yes.      Self-Assessment of Health  What is your perception of your health? Good  Do you sleep more than six hours a night? No  In the past 7 days, how much did pain keep you from doing your normal work? Some  Do you spend quality time with family or friends (virtually or in person)? Yes  Do you usually eat a heart healthy diet that constists of a variety of fruits, vegetables, whole grains and fiber? Yes  Do you eat foods high in fat and/or Fast Food more than three times per week? No    Advance Care Planning  Do you have an Advance Directive, Living Will, Durable Power of , or POLST? No                  Health Maintenance Summary            Overdue - Hepatitis C Screening (Once) Never done      No completion history exists for this topic.              Overdue - IMM DTaP/Tdap/Td Vaccine (1 - Tdap) Never done      No completion history exists for this topic.              Ordered - Colorectal Cancer Screening (View Topic Details) Ordered on 11/22/2023      No completion history exists for this topic.              Ordered - Mammogram (Every 2 Years) Ordered on 11/22/2023      No completion history exists for this topic.              Overdue - Zoster (Shingles) Vaccines (1 of 2) Never done      No completion history exists for this topic.              Overdue - Pneumococcal Vaccine: 65+ Years (1 of 1 - PCV) Never done      No completion history exists for this topic.              Overdue - COVID-19 Vaccine (3 - 2023-24 season) Overdue since 9/1/2023 12/29/2022  Imm Admin: PFIZER BIVALENT SARS-COV-2 VACCINE (12+)    06/07/2021  Imm Admin: William SARS-CoV-2 Vaccine              Annual Wellness Visit (Yearly) Next due on 5/1/2025 05/01/2024  Level of Service: ANNUAL WELLNESS VISIT-INCLUDES PPPS SUBSEQUE*              Bone Density Scan (Every 5 Years) Next due on 1/3/2029      01/03/2024  DS-BONE DENSITY STUDY (DEXA)              Influenza Vaccine (Series Information) Completed      01/25/2024  Imm Admin: Influenza Vaccine Adult HD    12/16/2022  Outside Immunization: Fluzone High-Dose Quad              Hepatitis A Vaccine (Hep A) (Series Information) Aged Out      No completion history exists for this topic.              Hepatitis B Vaccine (Hep B) (Series Information) Aged Out      No completion history exists for this topic.              HPV Vaccines (Series Information) Aged Out      No completion history exists for this topic.              Polio Vaccine (Inactivated Polio) (Series Information) Aged Out      No completion history exists for this topic.              Meningococcal Immunization (Series  "Information) Aged Out      No completion history exists for this topic.                    Patient Care Team:  Braeden Hall M.D. as PCP - General (Family Medicine)  Braeden Hall M.D. as PCP - [facility] Paneled (Family Medicine)  Erick Victoria M.D. (Cardiovascular Disease (Cardiology))  Irvin Lama D.O. (Thoracic Surgery (Cardiothoracic Vascular Surgery))  Nancy Gurrola as Patient Advocate (Pharmacy)      Financial Resource Strain: Low Risk  (5/1/2024)    Overall Financial Resource Strain (CARDIA)     Difficulty of Paying Living Expenses: Not hard at all      Transportation Needs: No Transportation Needs (5/1/2024)    PRAPARE - Transportation     Lack of Transportation (Medical): No     Lack of Transportation (Non-Medical): No      Food Insecurity: No Food Insecurity (5/1/2024)    Hunger Vital Sign     Worried About Running Out of Food in the Last Year: Never true     Ran Out of Food in the Last Year: Never true        Encounter Vitals  Blood Pressure : 122/74  Weight: 74.8 kg (165 lb)  Height: 165.1 cm (5' 5\")  BMI (Calculated): 27.46  Pain Score: No pain     Alert, oriented in no acute distress.  Eye contact is good, speech goal directed, affect calm.    Assessment and Plan. The following treatment and monitoring plan is recommended:  S/P CABG x 3  Chronic, stable.  The patient had a surgery in June of 2022. She is being followed by cardiology Linda.  The patient just graduated from cardiac rehab in April of 2024.   Continue with current defined treatment plan: metoprolol SR (TOPROL XL) 25 MG TABLET SR 24 HR, nitroglycerin (NITROSTAT) 0.4 MG SL Tab, olmesartan (BENICAR) 20 MG Tab  . Follow-up at least annually.      Osteopenia  Chronic, stable. The patient denies any recent falls of fractures. Discussed adding weight bearing exercise such as walking for 30 minutes most of the days of the week.  Also discussed adequate calcium and vitamin D ingestion and optimal diet. Chronic, stable. Continue " with current defined treatment plan: alendronate (FOSAMAX) 70 MG Tab . Follow-up at least annually.        Mixed hyperlipidemia  Chronic, stable. The patient denies any side effects from the current medication. Continue with current defined treatment plan: rosuvastatin (CRESTOR) 20 MG Tab . Follow-up at least annually.      Hypertension  Chronic, stable. The patient's blood pressure is well controlled with current medication. Continue with current defined treatment plan: olmesartan (BENICAR) 20 MG Tab, metoprolol SR (TOPROL XL) 25 MG TABLET SR 24 HR . Follow-up at least annually.      Arteriosclerosis of aorta (HCC)  Chronic, stable. Noted on 6/2022 on chest x-ray. Continue with current defined treatment plan: rosuvastatin (CRESTOR) 20 MG Tab.  Continue with blood pressure control.  Follow-up at least annually.      Mood disorder (HCC)  Chronic, stable.  PHQ-9 score today is 0.  The patient reports that her moods are getting better.  Denies SI/HI.  Continue with current defined treatment plan: citalopram (CELEXA) 40 MG Tab . Follow-up at least annually.      Alcohol dependence in remission (HCC)  Chronic, stable.  The patient reports that she has not had any alcohol in 8 years.  She does not consume any alcohol. Follow-up at least annually.      Coronary artery disease involving native coronary artery of native heart without angina pectoris  Chronic, stable.  The patient had a surgery in June of 2022. She is being followed by cardiology [facility].  No current chest pain or shortness of breath.  The patient just graduated from cardiac rehab in April of 2024.   Continue with current defined treatment plan: metoprolol SR (TOPROL XL) 25 MG TABLET SR 24 HR, nitroglycerin (NITROSTAT) 0.4 MG SL Tab, olmesartan (BENICAR) 20 MG Tab  . Follow-up at least annually.       Services suggested: No services needed at this time  Health Care Screening: Age-appropriate preventive services recommended by USPTF and ACIP covered by Medicare  were discussed today. Services ordered if indicated and agreed upon by the patient.  Referrals offered: Community-based lifestyle interventions to reduce health risks and promote self-management and wellness, fall prevention, nutrition, physical activity, tobacco-use cessation, weight loss, and mental health services as per orders if indicated.    Discussion today about general wellness and lifestyle habits:    Prevent falls and reduce trip hazards; Cautioned about securing or removing rugs.  Have a working fire alarm and carbon monoxide detector.  Engage in regular physical activity and social activities.    Follow-up: Return for appointment with Primary Care Provider as needed.

## 2024-05-01 NOTE — ASSESSMENT & PLAN NOTE
Chronic, stable. Noted on 6/2022 on chest x-ray. Continue with current defined treatment plan: rosuvastatin (CRESTOR) 20 MG Tab.  Continue with blood pressure control.  Follow-up at least annually.

## 2024-05-31 PROCEDURE — RXMED WILLOW AMBULATORY MEDICATION CHARGE: Performed by: FAMILY MEDICINE

## 2024-06-07 ENCOUNTER — PHARMACY VISIT (OUTPATIENT)
Dept: PHARMACY | Facility: MEDICAL CENTER | Age: 69
End: 2024-06-07
Payer: COMMERCIAL

## 2024-08-01 ENCOUNTER — TELEPHONE (OUTPATIENT)
Dept: HEALTH INFORMATION MANAGEMENT | Facility: OTHER | Age: 69
End: 2024-08-01
Payer: MEDICARE

## 2024-08-02 ENCOUNTER — APPOINTMENT (OUTPATIENT)
Dept: CARDIOLOGY | Facility: MEDICAL CENTER | Age: 69
End: 2024-08-02
Attending: INTERNAL MEDICINE
Payer: MEDICARE

## 2024-08-02 DIAGNOSIS — Z95.1 S/P CABG X 3: ICD-10-CM

## 2024-08-02 DIAGNOSIS — I25.10 CORONARY ARTERY DISEASE INVOLVING NATIVE CORONARY ARTERY OF NATIVE HEART WITHOUT ANGINA PECTORIS: ICD-10-CM

## 2024-08-02 DIAGNOSIS — E78.00 HYPERCHOLESTEROLEMIA: ICD-10-CM

## 2024-08-05 RX ORDER — ROSUVASTATIN CALCIUM 20 MG/1
20 TABLET, COATED ORAL EVERY EVENING
Qty: 100 TABLET | Refills: 0 | Status: SHIPPED | OUTPATIENT
Start: 2024-08-05 | End: 2024-08-26

## 2024-08-26 DIAGNOSIS — Z95.1 S/P CABG X 3: ICD-10-CM

## 2024-08-26 DIAGNOSIS — I25.10 CORONARY ARTERY DISEASE INVOLVING NATIVE CORONARY ARTERY OF NATIVE HEART WITHOUT ANGINA PECTORIS: ICD-10-CM

## 2024-08-26 DIAGNOSIS — E78.00 HYPERCHOLESTEROLEMIA: ICD-10-CM

## 2024-08-26 RX ORDER — ROSUVASTATIN CALCIUM 20 MG/1
20 TABLET, COATED ORAL EVERY EVENING
Qty: 100 TABLET | Refills: 1 | Status: SHIPPED | OUTPATIENT
Start: 2024-08-26

## 2024-08-26 NOTE — TELEPHONE ENCOUNTER
Pt has had OV within the 12 month protocol and lipid panel is current. 6 month supply sent to pharmacy.   Lab Results   Component Value Date/Time    CHOLSTRLTOT 121 01/09/2024 10:21 AM    LDL 43 01/09/2024 10:21 AM    HDL 47 01/09/2024 10:21 AM    TRIGLYCERIDE 155 (H) 01/09/2024 10:21 AM       Lab Results   Component Value Date/Time    SODIUM 130 (L) 01/09/2024 10:21 AM    POTASSIUM 4.8 01/09/2024 10:21 AM    CHLORIDE 94 (L) 01/09/2024 10:21 AM    CO2 24 01/09/2024 10:21 AM    GLUCOSE 91 01/09/2024 10:21 AM    BUN 13 01/09/2024 10:21 AM    CREATININE 0.74 01/09/2024 10:21 AM    BUNCREATRAT 20.0 05/13/2022 04:30 AM     Lab Results   Component Value Date/Time    ALKPHOSPHAT 101 (H) 01/09/2024 10:21 AM    ASTSGOT 22 01/09/2024 10:21 AM    ALTSGPT 13 01/09/2024 10:21 AM    TBILIRUBIN 0.6 01/09/2024 10:21 AM        Irais Thao, Clinical Pharmacist, CDE, CACP  Managed Care Pharmacist for [facility] and [facility]

## 2024-08-26 NOTE — TELEPHONE ENCOUNTER
Pt has had OV within the 12 month protocol and lipid panel is current. 6 month supply sent to pharmacy.   Lab Results   Component Value Date/Time    CHOLSTRLTOT 121 01/09/2024 10:21 AM    LDL 43 01/09/2024 10:21 AM    HDL 47 01/09/2024 10:21 AM    TRIGLYCERIDE 155 (H) 01/09/2024 10:21 AM       Lab Results   Component Value Date/Time    SODIUM 130 (L) 01/09/2024 10:21 AM    POTASSIUM 4.8 01/09/2024 10:21 AM    CHLORIDE 94 (L) 01/09/2024 10:21 AM    CO2 24 01/09/2024 10:21 AM    GLUCOSE 91 01/09/2024 10:21 AM    BUN 13 01/09/2024 10:21 AM    CREATININE 0.74 01/09/2024 10:21 AM    BUNCREATRAT 20.0 05/13/2022 04:30 AM     Lab Results   Component Value Date/Time    ALKPHOSPHAT 101 (H) 01/09/2024 10:21 AM    ASTSGOT 22 01/09/2024 10:21 AM    ALTSGPT 13 01/09/2024 10:21 AM    TBILIRUBIN 0.6 01/09/2024 10:21 AM        Irais Thao, Clinical Pharmacist, CDE, CACP  Managed Care Pharmacist for [facility] and [facility]

## 2024-09-04 PROCEDURE — RXMED WILLOW AMBULATORY MEDICATION CHARGE: Performed by: FAMILY MEDICINE

## 2024-09-06 ENCOUNTER — PHARMACY VISIT (OUTPATIENT)
Dept: PHARMACY | Facility: MEDICAL CENTER | Age: 69
End: 2024-09-06
Payer: COMMERCIAL

## 2024-09-12 PROCEDURE — RXMED WILLOW AMBULATORY MEDICATION CHARGE: Performed by: FAMILY MEDICINE

## 2024-09-17 ENCOUNTER — PHARMACY VISIT (OUTPATIENT)
Dept: PHARMACY | Facility: MEDICAL CENTER | Age: 69
End: 2024-09-17
Payer: COMMERCIAL

## 2024-09-26 ENCOUNTER — OFFICE VISIT (OUTPATIENT)
Dept: CARDIOLOGY | Facility: MEDICAL CENTER | Age: 69
End: 2024-09-26
Attending: INTERNAL MEDICINE
Payer: MEDICARE

## 2024-09-26 VITALS
OXYGEN SATURATION: 95 % | SYSTOLIC BLOOD PRESSURE: 132 MMHG | HEART RATE: 65 BPM | WEIGHT: 164 LBS | DIASTOLIC BLOOD PRESSURE: 70 MMHG | RESPIRATION RATE: 16 BRPM | HEIGHT: 65 IN | BODY MASS INDEX: 27.32 KG/M2

## 2024-09-26 DIAGNOSIS — I24.0 ISCHEMIC HEART DISEASE DUE TO CORONARY ARTERY OBSTRUCTION (HCC): ICD-10-CM

## 2024-09-26 DIAGNOSIS — E78.2 MIXED HYPERLIPIDEMIA: ICD-10-CM

## 2024-09-26 DIAGNOSIS — I25.9 ISCHEMIC HEART DISEASE DUE TO CORONARY ARTERY OBSTRUCTION (HCC): ICD-10-CM

## 2024-09-26 DIAGNOSIS — Z95.1 S/P CABG X 3: ICD-10-CM

## 2024-09-26 DIAGNOSIS — I25.10 CORONARY ARTERY DISEASE INVOLVING NATIVE CORONARY ARTERY OF NATIVE HEART WITHOUT ANGINA PECTORIS: ICD-10-CM

## 2024-09-26 DIAGNOSIS — I10 PRIMARY HYPERTENSION: ICD-10-CM

## 2024-09-26 PROCEDURE — 99212 OFFICE O/P EST SF 10 MIN: CPT | Performed by: INTERNAL MEDICINE

## 2024-09-26 RX ORDER — OLMESARTAN MEDOXOMIL 40 MG/1
40 TABLET ORAL DAILY
Qty: 90 TABLET | Refills: 3 | Status: SHIPPED | OUTPATIENT
Start: 2024-09-26

## 2024-09-26 RX ORDER — METOPROLOL SUCCINATE 25 MG/1
25 TABLET, EXTENDED RELEASE ORAL DAILY
Qty: 100 TABLET | Refills: 3 | Status: SHIPPED | OUTPATIENT
Start: 2024-09-26

## 2024-09-26 RX ORDER — ROSUVASTATIN CALCIUM 20 MG/1
20 TABLET, COATED ORAL EVERY EVENING
Qty: 100 TABLET | Refills: 1 | Status: SHIPPED | OUTPATIENT
Start: 2024-09-26

## 2024-09-26 RX ORDER — ASPIRIN 81 MG/1
81 TABLET ORAL DAILY
Qty: 100 TABLET | Refills: 3 | Status: SHIPPED | OUTPATIENT
Start: 2024-09-26

## 2024-09-26 ASSESSMENT — FIBROSIS 4 INDEX: FIB4 SCORE: 1.39

## 2024-09-26 NOTE — PROGRESS NOTES
Cardiology Follow Up Consultation Note    Date of note:    9/26/2024    Primary Care Provider: No primary care provider on file.  Referring Provider: No ref. provider found    Patient Name: Rony Chilel   YOB: 1955  MRN:              [id number]    Chief Complaint   Patient presents with    Coronary Artery Disease       History of Present Illness:  Ms. Rony Chilel is a 69-year-old woman with past medical history significant for coronary artery disease status post PCI to RCA, severe multivessel CAD status post CABG x 3, hypertension, hyperlipidemia with intolerance to statins presents to the cardiology office to for follow-up.    She initially presented to the office on 1/30/2024 to establish care with our practice.  Her cardiac history dates back to 5/11/2022 when she was admitted to [facility] with chest pain.  Found to have inferior STEMI and taken to the Cath Lab.  Noted to have under percent occlusion of proximal RCA and 80% distal occlusion of RCA that was treated with ALCON.  Had residual obstructive disease involving LAD and LCx.  Underwent CABG x 3 (LIMA to LAD, SVG to D1, SVG to OM1).    Interval history:  She presents to the office for follow-up.  Since her last visit, she tells me that her insurance company stopped approving and paying for her PCSK9 inhibitor therapy.  As such, C was switched to rosuvastatin 20 mg daily by her PCP.  She previously had muscle related side effects with statins but has been tolerating rosuvastatin without issues.  She currently feels well.  She denies having chest pain or shortness of breath.    In addition, she tells me that she has been checking her blood pressure at home regularly.  Blood pressure was persistently elevated at home with systolics in the 140s.  As such she doubled her olmesartan on her own accord for management.    Cardiovascular Risk Factors:  1. Smoking status: Former smoker. Quit in 2022  2. Type II Diabetes Mellitus:  Denies   Lab Results   Component Value Date/Time    HBA1C 5.1 01/09/2024 10:21 AM    HBA1C 5.4 06/28/2022 12:16 PM     3. Hypertension: Yes  4. Dyslipidemia: Yes   Cholesterol,Tot   Date Value Ref Range Status   01/09/2024 121 100 - 199 mg/dL Final     LDL   Date Value Ref Range Status   01/09/2024 43 <100 mg/dL Final     HDL   Date Value Ref Range Status   01/09/2024 47 >=40 mg/dL Final     Triglycerides   Date Value Ref Range Status   01/09/2024 155 (H) 0 - 149 mg/dL Final     5. Family history of early Coronary Artery Disease in a first degree relative (Male less than 55 years of age; Female less than 65 years of age): Denies      Review of Systems:  As per HPI. All other systems reviewed and are negative.      Past Medical History:   Diagnosis Date    CAD (coronary artery disease)     Hyperlipidemia     Hypertension     PONV (postoperative nausea and vomiting)     Psychiatric disorder     Depression, PTSD, anxiety         Past Surgical History:   Procedure Laterality Date    MULTIPLE CORONARY ARTERY BYPASS ENDO VEIN HARVEST  6/30/2022    Procedure: CABG, WITH ENDOSCOPIC VEIN PROCUREMENT-X3;  Surgeon: Irvin Lama D.O.;  Location: SURGERY [facility];  Service: Cardiothoracic    ECHOCARDIOGRAM, TRANSESOPHAGEAL, INTRAOPERATIVE  6/30/2022    Procedure: ECHOCARDIOGRAM, TRANSESOPHAGEAL, INTRAOPERATIVE;  Surgeon: Irvin Lama D.O.;  Location: SURGERY [facility];  Service: Cardiothoracic    CHOLECYSTECTOMY      Open    MAMMOPLASTY AUGMENTATION Bilateral          Current Outpatient Medications   Medication Sig Dispense Refill    olmesartan (BENICAR) 40 MG Tab Take 1 tablet by mouth every day. 90 Tablet 3    aspirin 81 MG EC tablet Take 1 tablet by mouth every day. 100 Tablet 3    metoprolol SR (TOPROL XL) 25 MG TABLET SR 24 HR Take 1 Tablet by mouth every day. 100 Tablet 3    rosuvastatin (CRESTOR) 20 MG Tab Take 1 tablet by mouth every evening. 100 Tablet 1    alendronate (FOSAMAX) 70 MG Tab Take 1  Tablet by mouth every 7 days. 12 Tablet 3    citalopram (CELEXA) 40 MG Tab Take 1 Tablet by mouth every day. 100 Tablet 3    nitroglycerin (NITROSTAT) 0.4 MG SL Tab Place 1 Tablet under the tongue one time as needed for Chest Pain for up to 1 dose. 12 Tablet 3    Loperamide HCl (IMODIUM PO) Take  by mouth.      diphenhydrAMINE (BENADRYL) 25 MG Tab Take 25 mg by mouth at bedtime as needed for Sleep.      acetaminophen (TYLENOL) 500 MG Tab Take 500 mg by mouth every 6 hours as needed.       No current facility-administered medications for this visit.         Allergies   Allergen Reactions    Oxycodone Itching    Brilinta [Ticagrelor]     Dilaudid [Hydromorphone] Itching    Morphine Vomiting and Nausea         History reviewed. No pertinent family history.      Social History     Socioeconomic History    Marital status:      Spouse name: Not on file    Number of children: Not on file    Years of education: Not on file    Highest education level: Not on file   Occupational History    Not on file   Tobacco Use    Smoking status: Every Day     Current packs/day: 0.00     Average packs/day: 0.5 packs/day for 15.0 years (7.5 ttl pk-yrs)     Types: Cigarettes     Start date: 2007     Last attempt to quit: 2022     Years since quittin.3    Smokeless tobacco: Never   Vaping Use    Vaping status: Never Used   Substance and Sexual Activity    Alcohol use: Not Currently     Comment: social    Drug use: Yes     Types: Marijuana     Comment: edibles, marijuana    Sexual activity: Not on file   Other Topics Concern    Not on file   Social History Narrative    Not on file     Social Determinants of Health     Financial Resource Strain: Low Risk  (2024)    Overall Financial Resource Strain (CARDIA)     Difficulty of Paying Living Expenses: Not hard at all   Food Insecurity: No Food Insecurity (2024)    Hunger Vital Sign     Worried About Running Out of Food in the Last Year: Never true     Ran Out of Food  "in the Last Year: Never true   Transportation Needs: No Transportation Needs (5/1/2024)    PRAPARE - Transportation     Lack of Transportation (Medical): No     Lack of Transportation (Non-Medical): No   Physical Activity: Insufficiently Active (5/1/2024)    Exercise Vital Sign     Days of Exercise per Week: 3 days     Minutes of Exercise per Session: 30 min   Stress: Not on file   Social Connections: Not on file   Intimate Partner Violence: Not on file   Housing Stability: Unknown (5/1/2024)    Housing Stability Vital Sign     Unable to Pay for Housing in the Last Year: No     Number of Places Lived in the Last Year: Not on file     Unstable Housing in the Last Year: No         Physical Exam:  Ambulatory Vitals  /70 (BP Location: Left arm, Patient Position: Sitting, BP Cuff Size: Adult)   Pulse 65   Resp 16   Ht 1.641 m (5' 4.6\")   Wt 74.4 kg (164 lb)   SpO2 95%    Oxygen Therapy:  Pulse Oximetry: 95 %  BP Readings from Last 4 Encounters:   09/26/24 132/70   05/01/24 122/74   01/30/24 132/78   01/25/24 118/64       Weight/BMI: Body mass index is 27.63 kg/m².  Wt Readings from Last 4 Encounters:   09/26/24 74.4 kg (164 lb)   05/01/24 74.8 kg (165 lb)   01/30/24 75.3 kg (166 lb)   01/25/24 73 kg (161 lb)         General: Not in acute distress, appears comfortable  HEENT: OP clear   Neck:  No carotid bruits, No JVD appreciated  CVS:  RRR, Normal S1, S2. No murmurs, rubs or gallops  Resp: Normal respiratory effort, lungs CTA bilaterally. No rales or rhonchi  Abdomen: Soft, non-distended, non-tender to palpation  Skin: No obvious rashes, no cyanosis  Neurological: Alert and oriented x3, moves all extremities, no focal neurologic deficits  Psychiatric: Appropriate affect  Extremities:   Extremities warm, 2+ bilateral radial pulses.  2+ bilateral dp pulses, no lower extremity edema bilaterally      Lab Data Review:  Lab Results   Component Value Date/Time    CHOLSTRLTOT 121 01/09/2024 10:21 AM    LDL 43 " 01/09/2024 10:21 AM    HDL 47 01/09/2024 10:21 AM    TRIGLYCERIDE 155 (H) 01/09/2024 10:21 AM       Lab Results   Component Value Date/Time    SODIUM 130 (L) 01/09/2024 10:21 AM    POTASSIUM 4.8 01/09/2024 10:21 AM    CHLORIDE 94 (L) 01/09/2024 10:21 AM    CO2 24 01/09/2024 10:21 AM    GLUCOSE 91 01/09/2024 10:21 AM    BUN 13 01/09/2024 10:21 AM    CREATININE 0.74 01/09/2024 10:21 AM    BUNCREATRAT 20.0 05/13/2022 04:30 AM     Lab Results   Component Value Date/Time    ALKPHOSPHAT 101 (H) 01/09/2024 10:21 AM    ASTSGOT 22 01/09/2024 10:21 AM    ALTSGPT 13 01/09/2024 10:21 AM    TBILIRUBIN 0.6 01/09/2024 10:21 AM      Lab Results   Component Value Date/Time    WBC 6.0 01/09/2024 10:21 AM    HEMOGLOBIN 14.3 01/09/2024 10:21 AM     Lab Results   Component Value Date/Time    HBA1C 5.1 01/09/2024 10:21 AM    HBA1C 5.4 06/28/2022 12:16 PM         Cardiac Imaging and Procedures Review:    EKG dated 1/30/2024:   My personal interpretation is sinus, nonspecific ST-T changes     TTE 5/13/2022:  1. The left ventricle is normal size and function. LVEF estimated at 55% by Jett's biplane method. Normal wall thickness. No segmental wall motion abnormalities seen. Grade 1 (impaired relaxation) diastolic dysfunction. No VSD or apical thrombus seen.   2. The right ventricle is normal in size and function. RVSP estimated at 27 mmHg with RAP of 3 mmHg.      Intraoperative SACHA 6/30/2022:  LVEF estimated at 70%. Mild MR. Biatrial enlargement.         Assessment & Plan     1. Ischemic heart disease due to coronary artery obstruction (HCC)  aspirin 81 MG EC tablet    Lipid Profile      2. Coronary artery disease involving native coronary artery of native heart without angina pectoris  olmesartan (BENICAR) 40 MG Tab    metoprolol SR (TOPROL XL) 25 MG TABLET SR 24 HR    rosuvastatin (CRESTOR) 20 MG Tab      3. S/P CABG x 3  rosuvastatin (CRESTOR) 20 MG Tab      4. Mixed hyperlipidemia        5. Primary hypertension  olmesartan (BENICAR)  40 MG Tab    metoprolol SR (TOPROL XL) 25 MG TABLET SR 24 HR            Medical Decision Making:  Ms. Rony Chilel is a 69-year-old woman with past medical history significant for coronary artery disease status post PCI to RCA, severe multivessel CAD status post CABG x 3, hypertension, hyperlipidemia with intolerance to statins presents to the cardiology office to for follow-up.    1. Ischemic heart disease due to coronary artery obstruction (HCC)  2. Coronary artery disease involving native coronary artery of native heart without angina pectoris  3. S/P CABG x 3  -Known history of multivessel CAD status post CABG x 3 as well as PCI to RCA.  Had LIMA to LAD, SVG to D1 and SVG to OM1.  -She is currently doing well overall.  No concerning symptoms of exertional chest pain or dyspnea.  Continue rosuvastatin therapy.  Continue metoprolol succinate 25 mg daily and aspirin 81 mg daily.    4. Mixed hyperlipidemia  -Prior lipid panel shows excellent LDL control.  She is no longer on PCSK9 inhibitor therapy due to lack of insurance coverage.  Currently tolerating rosuvastatin 20 mg daily.  Would not increase any further given prior history of statin associated myalgias.  We will repeat lipid panel to ensure LDL remains well-controlled at goal of less than 70.  If not, would start Zetia 10 mg daily.    5. Primary hypertension  -Blood pressure is currently acceptable.  Continue olmesartan 40 mg daily.  Metoprolol 25 mg daily.  Refills will be sent to pharmacy.    () Today's E/M visit is associated with medical care services that serve as the continuing focal point for all needed health care services and/or with medical care services that  are part of ongoing care related to a patient's single, serious condition, or a complex condition: This includes  furnishing services to patients on an ongoing basis that result in care that is personalized  to the patient. The services result in a comprehensive, longitudinal, and  continuous  relationship with the patient and involve delivery of team-based care that is accessible, coordinated with other practitioners and providers, and integrated with the broader health care landscape.       It was a pleasure seeing Ms. Rony Chilel in the office today. Return in about 6 months (around 3/26/2025) for Coronary artery disease. Patient is aware to call the cardiology clinic with any questions or concerns.      Israel Mckeon MD, [facility]  Cardiologist, [facility], [facility].  [address] [address]  Phone: [phone]  Fax: [phone]    Please note that this dictation was created using voice recognition software. I have made every reasonable attempt to correct obvious errors, but it is possible there are errors of grammar and possibly content that I did not discover before finalizing the note.

## 2024-09-30 ENCOUNTER — OFFICE VISIT (OUTPATIENT)
Dept: MEDICAL GROUP | Facility: MEDICAL CENTER | Age: 69
End: 2024-09-30
Payer: MEDICARE

## 2024-09-30 VITALS
HEIGHT: 64 IN | BODY MASS INDEX: 28.85 KG/M2 | SYSTOLIC BLOOD PRESSURE: 116 MMHG | HEART RATE: 63 BPM | WEIGHT: 169 LBS | DIASTOLIC BLOOD PRESSURE: 64 MMHG | OXYGEN SATURATION: 97 % | TEMPERATURE: 97.7 F

## 2024-09-30 DIAGNOSIS — M81.0 AGE-RELATED OSTEOPOROSIS WITHOUT CURRENT PATHOLOGICAL FRACTURE: ICD-10-CM

## 2024-09-30 DIAGNOSIS — Z13.29 SCREENING FOR ENDOCRINE, METABOLIC AND IMMUNITY DISORDER: ICD-10-CM

## 2024-09-30 DIAGNOSIS — Z95.1 S/P CABG X 3: ICD-10-CM

## 2024-09-30 DIAGNOSIS — Z72.0 TOBACCO ABUSE: ICD-10-CM

## 2024-09-30 DIAGNOSIS — E55.9 VITAMIN D DEFICIENCY: ICD-10-CM

## 2024-09-30 DIAGNOSIS — I25.10 CORONARY ARTERY DISEASE INVOLVING NATIVE CORONARY ARTERY OF NATIVE HEART WITHOUT ANGINA PECTORIS: ICD-10-CM

## 2024-09-30 DIAGNOSIS — F43.10 PTSD (POST-TRAUMATIC STRESS DISORDER): ICD-10-CM

## 2024-09-30 DIAGNOSIS — F39 MOOD DISORDER (HCC): ICD-10-CM

## 2024-09-30 DIAGNOSIS — Z12.11 SCREEN FOR COLON CANCER: ICD-10-CM

## 2024-09-30 DIAGNOSIS — Z13.0 SCREENING FOR ENDOCRINE, METABOLIC AND IMMUNITY DISORDER: ICD-10-CM

## 2024-09-30 DIAGNOSIS — G47.00 INSOMNIA, UNSPECIFIED TYPE: ICD-10-CM

## 2024-09-30 DIAGNOSIS — E66.3 OVERWEIGHT (BMI 25.0-29.9): ICD-10-CM

## 2024-09-30 DIAGNOSIS — R73.01 IFG (IMPAIRED FASTING GLUCOSE): ICD-10-CM

## 2024-09-30 DIAGNOSIS — Z13.228 SCREENING FOR ENDOCRINE, METABOLIC AND IMMUNITY DISORDER: ICD-10-CM

## 2024-09-30 DIAGNOSIS — Z23 NEED FOR VACCINATION: ICD-10-CM

## 2024-09-30 DIAGNOSIS — Z76.89 ENCOUNTER TO ESTABLISH CARE: ICD-10-CM

## 2024-09-30 DIAGNOSIS — Z11.59 NEED FOR HEPATITIS C SCREENING TEST: ICD-10-CM

## 2024-09-30 DIAGNOSIS — Z12.31 ENCOUNTER FOR SCREENING MAMMOGRAM FOR BREAST CANCER: ICD-10-CM

## 2024-09-30 RX ORDER — CITALOPRAM HYDROBROMIDE 40 MG/1
40 TABLET ORAL DAILY
Qty: 100 TABLET | Refills: 3 | Status: SHIPPED | OUTPATIENT
Start: 2024-09-30

## 2024-09-30 RX ORDER — ALENDRONATE SODIUM 70 MG/1
70 TABLET ORAL
Qty: 12 TABLET | Refills: 3 | Status: SHIPPED | OUTPATIENT
Start: 2024-09-30

## 2024-09-30 SDOH — ECONOMIC STABILITY: TRANSPORTATION INSECURITY
IN THE PAST 12 MONTHS, HAS LACK OF TRANSPORTATION KEPT YOU FROM MEETINGS, WORK, OR FROM GETTING THINGS NEEDED FOR DAILY LIVING?: YES

## 2024-09-30 SDOH — ECONOMIC STABILITY: FOOD INSECURITY: WITHIN THE PAST 12 MONTHS, YOU WORRIED THAT YOUR FOOD WOULD RUN OUT BEFORE YOU GOT MONEY TO BUY MORE.: NEVER TRUE

## 2024-09-30 SDOH — ECONOMIC STABILITY: INCOME INSECURITY: HOW HARD IS IT FOR YOU TO PAY FOR THE VERY BASICS LIKE FOOD, HOUSING, MEDICAL CARE, AND HEATING?: NOT VERY HARD

## 2024-09-30 SDOH — HEALTH STABILITY: PHYSICAL HEALTH: ON AVERAGE, HOW MANY DAYS PER WEEK DO YOU ENGAGE IN MODERATE TO STRENUOUS EXERCISE (LIKE A BRISK WALK)?: 2 DAYS

## 2024-09-30 SDOH — ECONOMIC STABILITY: FOOD INSECURITY: WITHIN THE PAST 12 MONTHS, THE FOOD YOU BOUGHT JUST DIDN'T LAST AND YOU DIDN'T HAVE MONEY TO GET MORE.: NEVER TRUE

## 2024-09-30 SDOH — HEALTH STABILITY: PHYSICAL HEALTH: ON AVERAGE, HOW MANY MINUTES DO YOU ENGAGE IN EXERCISE AT THIS LEVEL?: 20 MIN

## 2024-09-30 SDOH — HEALTH STABILITY: MENTAL HEALTH
STRESS IS WHEN SOMEONE FEELS TENSE, NERVOUS, ANXIOUS, OR CAN'T SLEEP AT NIGHT BECAUSE THEIR MIND IS TROUBLED. HOW STRESSED ARE YOU?: TO SOME EXTENT

## 2024-09-30 SDOH — ECONOMIC STABILITY: INCOME INSECURITY: IN THE LAST 12 MONTHS, WAS THERE A TIME WHEN YOU WERE NOT ABLE TO PAY THE MORTGAGE OR RENT ON TIME?: NO

## 2024-09-30 SDOH — ECONOMIC STABILITY: TRANSPORTATION INSECURITY
IN THE PAST 12 MONTHS, HAS LACK OF RELIABLE TRANSPORTATION KEPT YOU FROM MEDICAL APPOINTMENTS, MEETINGS, WORK OR FROM GETTING THINGS NEEDED FOR DAILY LIVING?: YES

## 2024-09-30 ASSESSMENT — LIFESTYLE VARIABLES
AUDIT-C TOTAL SCORE: 1
HOW MANY STANDARD DRINKS CONTAINING ALCOHOL DO YOU HAVE ON A TYPICAL DAY: 1 OR 2
HOW OFTEN DO YOU HAVE A DRINK CONTAINING ALCOHOL: MONTHLY OR LESS
HOW OFTEN DO YOU HAVE SIX OR MORE DRINKS ON ONE OCCASION: NEVER
SKIP TO QUESTIONS 9-10: 1

## 2024-09-30 ASSESSMENT — SOCIAL DETERMINANTS OF HEALTH (SDOH)
HOW MANY DRINKS CONTAINING ALCOHOL DO YOU HAVE ON A TYPICAL DAY WHEN YOU ARE DRINKING: 1 OR 2
HOW OFTEN DO YOU ATTENT MEETINGS OF THE CLUB OR ORGANIZATION YOU BELONG TO?: MORE THAN 4 TIMES PER YEAR
IN A TYPICAL WEEK, HOW MANY TIMES DO YOU TALK ON THE PHONE WITH FAMILY, FRIENDS, OR NEIGHBORS?: THREE TIMES A WEEK
HOW OFTEN DO YOU HAVE SIX OR MORE DRINKS ON ONE OCCASION: NEVER
DO YOU BELONG TO ANY CLUBS OR ORGANIZATIONS SUCH AS CHURCH GROUPS UNIONS, FRATERNAL OR ATHLETIC GROUPS, OR SCHOOL GROUPS?: YES
WITHIN THE PAST 12 MONTHS, YOU WORRIED THAT YOUR FOOD WOULD RUN OUT BEFORE YOU GOT THE MONEY TO BUY MORE: NEVER TRUE
HOW HARD IS IT FOR YOU TO PAY FOR THE VERY BASICS LIKE FOOD, HOUSING, MEDICAL CARE, AND HEATING?: NOT VERY HARD
HOW OFTEN DO YOU HAVE A DRINK CONTAINING ALCOHOL: MONTHLY OR LESS
HOW OFTEN DO YOU GET TOGETHER WITH FRIENDS OR RELATIVES?: TWICE A WEEK
IN A TYPICAL WEEK, HOW MANY TIMES DO YOU TALK ON THE PHONE WITH FAMILY, FRIENDS, OR NEIGHBORS?: THREE TIMES A WEEK
HOW OFTEN DO YOU GET TOGETHER WITH FRIENDS OR RELATIVES?: TWICE A WEEK
IN THE PAST 12 MONTHS, HAS THE ELECTRIC, GAS, OIL, OR WATER COMPANY THREATENED TO SHUT OFF SERVICE IN YOUR HOME?: NO
HOW OFTEN DO YOU ATTENT MEETINGS OF THE CLUB OR ORGANIZATION YOU BELONG TO?: MORE THAN 4 TIMES PER YEAR
DO YOU BELONG TO ANY CLUBS OR ORGANIZATIONS SUCH AS CHURCH GROUPS UNIONS, FRATERNAL OR ATHLETIC GROUPS, OR SCHOOL GROUPS?: YES
HOW OFTEN DO YOU ATTEND CHURCH OR RELIGIOUS SERVICES?: MORE THAN 4 TIMES PER YEAR
HOW OFTEN DO YOU ATTEND CHURCH OR RELIGIOUS SERVICES?: MORE THAN 4 TIMES PER YEAR

## 2024-09-30 ASSESSMENT — ENCOUNTER SYMPTOMS
ORTHOPNEA: 0
CHILLS: 0
COUGH: 0
PALPITATIONS: 0
SHORTNESS OF BREATH: 0
FEVER: 0

## 2024-09-30 ASSESSMENT — FIBROSIS 4 INDEX: FIB4 SCORE: 1.39

## 2024-09-30 NOTE — PROGRESS NOTES
Subjective:     Verbal consent was acquired by the patient to use [email or web address] ambient listening note generation during this visit Yes    CC: Establish Care (Pt would like to discuss starting Wegovy.)      HPI:   Rony is a 69 y.o. female who presents today for:    History of Present Illness  The patient is a 69-year-old female who presents today to establish care.    She has a history of hypertension, coronary artery disease, osteopenia, and hypercholesterolemia. She underwent a triple coronary artery bypass surgery following an inferior myocardial infarction, which required the placement of two stents in her right coronary artery. She is currently on rosuvastatin for her cholesterol, which she tolerates well. She was given a slip for a cholesterol panel by Dr. Flores, her cardiologist.    She has a history of alcohol abuse, which is now in remission. She resumed smoking about 2 months ago, consuming half a pack a day due to addictive behavior and cravings.      She is considering Wegovy for weight management and to curb her cigarette cravings. She believes that maintaining a healthy weight is crucial for her heart health. She has never been able to maintain weight loss for a long period.    Her fasting blood sugar levels were in the 120s during her myocardial infarction, and her A1c was 6.2. After dietary changes, her A1c dropped to 5.1 a year ago. She is interested in rechecking her labs as her eating habits have been uncontrolled recently.    She has a history of PTSD with depression and anxiety, which she manages with citalopram.     During her ER visit prior to her MI, she had a severe episode of bilateral jaw pain 24 hours before her myocardial infarction, which was initially dismissed as anxiety. She has been doing well since her heart attack and subsequent bypass surgery, with no chest pain or issues.     She takes THC with CBD and CBG intermittently for chronic pain, which she finds helpful. She is due for  refills of her citalopram and alendronate prescriptions. She sleeps about 3 hours a night and wonders if this is normal for her age.    SOCIAL HISTORY  She started smoking again about 2 months ago and is smoking half a pack a day.    FAMILY HISTORY  She has a very strong family history of coronary artery disease. Every generation there has been at least 1 to 3 that end up with bypass surgery before they drop down. Her grandfather dropped dead. Her father had coronary artery disease. Her brother had coronary artery disease with the bypass. Her first cousin also had coronary artery disease. No family history of thyroid cancer.         Allergies: Oxycodone, Brilinta [ticagrelor], Dilaudid [hydromorphone], Morphine, and Oxycodone-acetaminophen     Medications:   Current Outpatient Medications:     Semaglutide (WEGOVY) 0.5 MG/0.5ML Solution Auto-injector Pen-injector, Inject 0.5 mL under the skin every 7 days., Disp: 12 Each, Rfl: 3    citalopram (CELEXA) 40 MG Tab, Take 1 Tablet by mouth every day., Disp: 100 Tablet, Rfl: 3    alendronate (FOSAMAX) 70 MG Tab, Take 1 Tablet by mouth every 7 days., Disp: 12 Tablet, Rfl: 3    olmesartan (BENICAR) 40 MG Tab, Take 1 tablet by mouth every day., Disp: 90 Tablet, Rfl: 3    aspirin 81 MG EC tablet, Take 1 tablet by mouth every day., Disp: 100 Tablet, Rfl: 3    metoprolol SR (TOPROL XL) 25 MG TABLET SR 24 HR, Take 1 Tablet by mouth every day., Disp: 100 Tablet, Rfl: 3    rosuvastatin (CRESTOR) 20 MG Tab, Take 1 tablet by mouth every evening., Disp: 100 Tablet, Rfl: 1    nitroglycerin (NITROSTAT) 0.4 MG SL Tab, Place 1 Tablet under the tongue one time as needed for Chest Pain for up to 1 dose., Disp: 12 Tablet, Rfl: 3    Loperamide HCl (IMODIUM PO), Take  by mouth., Disp: , Rfl:     diphenhydrAMINE (BENADRYL) 25 MG Tab, Take 25 mg by mouth at bedtime as needed for Sleep., Disp: , Rfl:     acetaminophen (TYLENOL) 500 MG Tab, Take 500 mg by mouth every 6 hours as needed., Disp: ,  "Rfl:       ROS:  Review of Systems   Constitutional:  Negative for chills and fever.   Respiratory:  Negative for cough and shortness of breath.    Cardiovascular:  Negative for chest pain, palpitations, orthopnea and leg swelling.       Objective:     Exam:  /64   Pulse 63   Temp 36.5 °C (97.7 °F) (Temporal)   Ht 1.626 m (5' 4\")   Wt 76.7 kg (169 lb)   SpO2 97%   BMI 29.01 kg/m²  Body mass index is 29.01 kg/m².    Physical Exam  Constitutional:       Appearance: Normal appearance.   Eyes:      Pupils: Pupils are equal, round, and reactive to light.   Cardiovascular:      Rate and Rhythm: Normal rate and regular rhythm.      Pulses: Normal pulses.      Heart sounds: Normal heart sounds.   Pulmonary:      Effort: Pulmonary effort is normal.      Breath sounds: Normal breath sounds.   Abdominal:      General: Bowel sounds are normal.      Palpations: Abdomen is soft.   Neurological:      Mental Status: She is alert and oriented to person, place, and time.   Psychiatric:         Mood and Affect: Mood normal.         Behavior: Behavior normal.           Assessment & Plan:     Rony connelly 69 y.o. female with the following -       1. Encounter to establish care    2. S/P CABG x 3  3. Coronary artery disease involving native coronary artery of native heart without angina pectoris  Chronic, stable  She has a history of coronary artery disease with a coronary artery bypass x3 and two stents placed in her right coronary artery. She is currently taking rosuvastatin and tolerating it well. She was advised to maintain a healthy diet and increase physical activity. A prescription for Wegovy will be sent. If coverage is denied, alternative options such as compounding pharmacies or [facility] clinics will be considered.- Semaglutide (WEGOVY) 0.5 MG/0.5ML Solution Auto-injector Pen-injector; Inject 0.5 mL under the skin every 7 days.  Dispense: 12 Each; Refill: 3  - Lipid Profile; Future  - APOLIPOPROTEIN B; Future    4. " Overweight (BMI 25.0-29.9)  Chronic, stable  She has gained 15 pounds in the last 2 years. She was advised to maintain a healthy diet and increase physical activity. A prescription for Wegovy will be sent, starting with the lowest dose as a once-weekly injectable. Prior authorization will be sought to determine if Medicare will cover it. She will be informed of the outcome via a phone call or [employer] message. If coverage is denied, alternative options such as compounding pharmacies or [facility] clinics will be considered.  - Semaglutide (WEGOVY) 0.5 MG/0.5ML Solution Auto-injector Pen-injector; Inject 0.5 mL under the skin every 7 days.  Dispense: 12 Each; Refill: 3    5. Mood disorder (HCC)  6. PTSD (post-traumatic stress disorder)  Chronic, stable  She has a history of PTSD with depression and anxiety, which is currently well-controlled with citalopram. A prescription for citalopram will be refilled.  - citalopram (CELEXA) 40 MG Tab; Take 1 Tablet by mouth every day.  Dispense: 100 Tablet; Refill: 3    7. Vitamin D deficiency  Chronic, stable  - VITAMIN D,25 HYDROXY (DEFICIENCY); Future    8. IFG (impaired fasting glucose)  Chronic, stable  Continue to work on diet and exercise.  - HEMOGLOBIN A1C; Future    9. Age-related osteoporosis without current pathological fracture  Chronic, stable  She has a history of osteopenia. A prescription for alendronate will be refilled.  - alendronate (FOSAMAX) 70 MG Tab; Take 1 Tablet by mouth every 7 days.  Dispense: 12 Tablet; Refill: 3    10. Tobacco abuse  Chronic, unstable  She started smoking again about 2 months ago and is smoking half a pack a day. She was advised to consider quitting and was informed that Wegovy might help with cravings for cigarettes.    11. Insomnia, unspecified type  Chronic, unstable  She reports difficulty sleeping, averaging about 3 hours a night. She was advised to try melatonin or Unisom over the counter. If these do not work, other options will be  considered.    12. Need for hepatitis C screening test  - HEP C VIRUS ANTIBODY; Future    13. Screening for endocrine, metabolic and immunity disorder  - TSH WITH REFLEX TO FT4; Future  - Comp Metabolic Panel; Future  - CBC WITHOUT DIFFERENTIAL; Future    14. Screen for colon cancer  - Cologuard® colon cancer screening    15. Need for vaccination  - INFLUENZA VACCINE, HIGH DOSE (65+ ONLY)  - Pneumococcal Conjugate Vaccine 20-Valent (6 wks+)    16. Encounter for screening mammogram for breast cancer  - MA-SCREENING MAMMO BILAT W/TOMOSYNTHESIS W/CAD; Future                Anticipatory guidance included the following: Patient counseled about skin care, diet, supplements, smoking, drugs/alcohol use, safe sex and exercise.     Return if symptoms worsen or fail to improve.    Please note that this dictation was created using voice recognition software. I have made every reasonable attempt to correct obvious errors, but I expect that there are errors of grammar and possibly content that I did not discover before finalizing the note.      I have placed the below orders and discussed them with an approved delegating provider.  The MA is performing the below orders under the direction of Dr. Hurst.

## 2024-10-03 ENCOUNTER — PATIENT MESSAGE (OUTPATIENT)
Dept: HEALTH INFORMATION MANAGEMENT | Facility: OTHER | Age: 69
End: 2024-10-03

## 2024-10-06 ENCOUNTER — APPOINTMENT (OUTPATIENT)
Dept: RADIOLOGY | Facility: MEDICAL CENTER | Age: 69
End: 2024-10-06
Attending: EMERGENCY MEDICINE
Payer: MEDICARE

## 2024-10-06 ENCOUNTER — HOSPITAL ENCOUNTER (OUTPATIENT)
Facility: MEDICAL CENTER | Age: 69
End: 2024-10-07
Attending: EMERGENCY MEDICINE | Admitting: STUDENT IN AN ORGANIZED HEALTH CARE EDUCATION/TRAINING PROGRAM
Payer: MEDICARE

## 2024-10-06 DIAGNOSIS — I24.9 ACS (ACUTE CORONARY SYNDROME) (HCC): ICD-10-CM

## 2024-10-06 DIAGNOSIS — I10 PRIMARY HYPERTENSION: ICD-10-CM

## 2024-10-06 DIAGNOSIS — Z95.1 S/P CABG X 3: ICD-10-CM

## 2024-10-06 DIAGNOSIS — R07.9 CHEST PAIN, UNSPECIFIED TYPE: ICD-10-CM

## 2024-10-06 LAB
ALBUMIN SERPL BCP-MCNC: 4.5 G/DL (ref 3.2–4.9)
ALBUMIN/GLOB SERPL: 2 G/DL
ALP SERPL-CCNC: 96 U/L (ref 30–99)
ALT SERPL-CCNC: 15 U/L (ref 2–50)
ANION GAP SERPL CALC-SCNC: 12 MMOL/L (ref 7–16)
AST SERPL-CCNC: 24 U/L (ref 12–45)
BASOPHILS # BLD AUTO: 0.8 % (ref 0–1.8)
BASOPHILS # BLD: 0.08 K/UL (ref 0–0.12)
BILIRUB SERPL-MCNC: 0.5 MG/DL (ref 0.1–1.5)
BUN SERPL-MCNC: 9 MG/DL (ref 8–22)
CALCIUM ALBUM COR SERPL-MCNC: 8.9 MG/DL (ref 8.5–10.5)
CALCIUM SERPL-MCNC: 9.3 MG/DL (ref 8.5–10.5)
CHLORIDE SERPL-SCNC: 87 MMOL/L (ref 96–112)
CHOLEST SERPL-MCNC: 121 MG/DL (ref 100–199)
CO2 SERPL-SCNC: 27 MMOL/L (ref 20–33)
CREAT SERPL-MCNC: 0.84 MG/DL (ref 0.5–1.4)
EKG IMPRESSION: NORMAL
EOSINOPHIL # BLD AUTO: 0.1 K/UL (ref 0–0.51)
EOSINOPHIL NFR BLD: 1 % (ref 0–6.9)
ERYTHROCYTE [DISTWIDTH] IN BLOOD BY AUTOMATED COUNT: 36.7 FL (ref 35.9–50)
EST. AVERAGE GLUCOSE BLD GHB EST-MCNC: 103 MG/DL
GFR SERPLBLD CREATININE-BSD FMLA CKD-EPI: 75 ML/MIN/1.73 M 2
GLOBULIN SER CALC-MCNC: 2.3 G/DL (ref 1.9–3.5)
GLUCOSE SERPL-MCNC: 86 MG/DL (ref 65–99)
HBA1C MFR BLD: 5.2 % (ref 4–5.6)
HCT VFR BLD AUTO: 39.5 % (ref 37–47)
HDLC SERPL-MCNC: 56 MG/DL
HGB BLD-MCNC: 14.5 G/DL (ref 12–16)
IMM GRANULOCYTES # BLD AUTO: 0.05 K/UL (ref 0–0.11)
IMM GRANULOCYTES NFR BLD AUTO: 0.5 % (ref 0–0.9)
LDLC SERPL CALC-MCNC: 31 MG/DL
LYMPHOCYTES # BLD AUTO: 3.62 K/UL (ref 1–4.8)
LYMPHOCYTES NFR BLD: 36.1 % (ref 22–41)
MCH RBC QN AUTO: 32.7 PG (ref 27–33)
MCHC RBC AUTO-ENTMCNC: 36.7 G/DL (ref 32.2–35.5)
MCV RBC AUTO: 89.2 FL (ref 81.4–97.8)
MONOCYTES # BLD AUTO: 0.67 K/UL (ref 0–0.85)
MONOCYTES NFR BLD AUTO: 6.7 % (ref 0–13.4)
NEUTROPHILS # BLD AUTO: 5.51 K/UL (ref 1.82–7.42)
NEUTROPHILS NFR BLD: 54.9 % (ref 44–72)
NRBC # BLD AUTO: 0 K/UL
NRBC BLD-RTO: 0 /100 WBC (ref 0–0.2)
NT-PROBNP SERPL IA-MCNC: 1776 PG/ML (ref 0–125)
PLATELET # BLD AUTO: 261 K/UL (ref 164–446)
PMV BLD AUTO: 8.8 FL (ref 9–12.9)
POTASSIUM SERPL-SCNC: 4.3 MMOL/L (ref 3.6–5.5)
PROT SERPL-MCNC: 6.8 G/DL (ref 6–8.2)
RBC # BLD AUTO: 4.43 M/UL (ref 4.2–5.4)
SODIUM SERPL-SCNC: 126 MMOL/L (ref 135–145)
TRIGL SERPL-MCNC: 170 MG/DL (ref 0–149)
TROPONIN T SERPL-MCNC: 9 NG/L (ref 6–19)
WBC # BLD AUTO: 10 K/UL (ref 4.8–10.8)

## 2024-10-06 PROCEDURE — 93005 ELECTROCARDIOGRAM TRACING: CPT

## 2024-10-06 PROCEDURE — 83880 ASSAY OF NATRIURETIC PEPTIDE: CPT

## 2024-10-06 PROCEDURE — 36415 COLL VENOUS BLD VENIPUNCTURE: CPT

## 2024-10-06 PROCEDURE — 93005 ELECTROCARDIOGRAM TRACING: CPT | Performed by: EMERGENCY MEDICINE

## 2024-10-06 PROCEDURE — 83036 HEMOGLOBIN GLYCOSYLATED A1C: CPT

## 2024-10-06 PROCEDURE — 71045 X-RAY EXAM CHEST 1 VIEW: CPT

## 2024-10-06 PROCEDURE — 96374 THER/PROPH/DIAG INJ IV PUSH: CPT

## 2024-10-06 PROCEDURE — 85025 COMPLETE CBC W/AUTO DIFF WBC: CPT

## 2024-10-06 PROCEDURE — 80061 LIPID PANEL: CPT

## 2024-10-06 PROCEDURE — 99223 1ST HOSP IP/OBS HIGH 75: CPT | Mod: AI | Performed by: STUDENT IN AN ORGANIZED HEALTH CARE EDUCATION/TRAINING PROGRAM

## 2024-10-06 PROCEDURE — 99285 EMERGENCY DEPT VISIT HI MDM: CPT

## 2024-10-06 PROCEDURE — 96372 THER/PROPH/DIAG INJ SC/IM: CPT

## 2024-10-06 PROCEDURE — 700111 HCHG RX REV CODE 636 W/ 250 OVERRIDE (IP): Mod: JZ | Performed by: STUDENT IN AN ORGANIZED HEALTH CARE EDUCATION/TRAINING PROGRAM

## 2024-10-06 PROCEDURE — 80053 COMPREHEN METABOLIC PANEL: CPT

## 2024-10-06 PROCEDURE — 84484 ASSAY OF TROPONIN QUANT: CPT

## 2024-10-06 PROCEDURE — G0378 HOSPITAL OBSERVATION PER HR: HCPCS

## 2024-10-06 RX ORDER — CITALOPRAM HYDROBROMIDE 40 MG/1
40 TABLET ORAL EVERY EVENING
Status: DISCONTINUED | OUTPATIENT
Start: 2024-10-07 | End: 2024-10-07 | Stop reason: HOSPADM

## 2024-10-06 RX ORDER — ACETAMINOPHEN 325 MG/1
650 TABLET ORAL EVERY 6 HOURS PRN
Status: DISCONTINUED | OUTPATIENT
Start: 2024-10-06 | End: 2024-10-07 | Stop reason: HOSPADM

## 2024-10-06 RX ORDER — POLYETHYLENE GLYCOL 3350 17 G/17G
1 POWDER, FOR SOLUTION ORAL
Status: DISCONTINUED | OUTPATIENT
Start: 2024-10-06 | End: 2024-10-07 | Stop reason: HOSPADM

## 2024-10-06 RX ORDER — METOPROLOL SUCCINATE 25 MG/1
25 TABLET, EXTENDED RELEASE ORAL EVERY EVENING
Status: DISCONTINUED | OUTPATIENT
Start: 2024-10-07 | End: 2024-10-07 | Stop reason: HOSPADM

## 2024-10-06 RX ORDER — ONDANSETRON 4 MG/1
4 TABLET, ORALLY DISINTEGRATING ORAL EVERY 4 HOURS PRN
Status: DISCONTINUED | OUTPATIENT
Start: 2024-10-06 | End: 2024-10-07 | Stop reason: HOSPADM

## 2024-10-06 RX ORDER — ENOXAPARIN SODIUM 100 MG/ML
40 INJECTION SUBCUTANEOUS DAILY
Status: DISCONTINUED | OUTPATIENT
Start: 2024-10-06 | End: 2024-10-07 | Stop reason: HOSPADM

## 2024-10-06 RX ORDER — HYDRALAZINE HYDROCHLORIDE 20 MG/ML
10 INJECTION INTRAMUSCULAR; INTRAVENOUS EVERY 4 HOURS PRN
Status: DISCONTINUED | OUTPATIENT
Start: 2024-10-06 | End: 2024-10-07 | Stop reason: HOSPADM

## 2024-10-06 RX ORDER — OLMESARTAN MEDOXOMIL 20 MG/1
40 TABLET ORAL NIGHTLY
Status: DISCONTINUED | OUTPATIENT
Start: 2024-10-07 | End: 2024-10-07 | Stop reason: HOSPADM

## 2024-10-06 RX ORDER — ASPIRIN 81 MG/1
81 TABLET ORAL DAILY
Status: DISCONTINUED | OUTPATIENT
Start: 2024-10-07 | End: 2024-10-07 | Stop reason: HOSPADM

## 2024-10-06 RX ORDER — ROSUVASTATIN CALCIUM 20 MG/1
20 TABLET, COATED ORAL NIGHTLY
Status: DISCONTINUED | OUTPATIENT
Start: 2024-10-07 | End: 2024-10-07 | Stop reason: HOSPADM

## 2024-10-06 RX ORDER — AMOXICILLIN 250 MG
2 CAPSULE ORAL EVERY EVENING
Status: DISCONTINUED | OUTPATIENT
Start: 2024-10-07 | End: 2024-10-07 | Stop reason: HOSPADM

## 2024-10-06 RX ORDER — ONDANSETRON 2 MG/ML
4 INJECTION INTRAMUSCULAR; INTRAVENOUS EVERY 4 HOURS PRN
Status: DISCONTINUED | OUTPATIENT
Start: 2024-10-06 | End: 2024-10-07 | Stop reason: HOSPADM

## 2024-10-06 RX ORDER — NITROGLYCERIN 0.4 MG/1
0.4 TABLET SUBLINGUAL
Status: DISCONTINUED | OUTPATIENT
Start: 2024-10-06 | End: 2024-10-07 | Stop reason: HOSPADM

## 2024-10-06 RX ADMIN — HYDRALAZINE HYDROCHLORIDE 10 MG: 20 INJECTION, SOLUTION INTRAMUSCULAR; INTRAVENOUS at 23:40

## 2024-10-06 RX ADMIN — ENOXAPARIN SODIUM 40 MG: 100 INJECTION SUBCUTANEOUS at 23:38

## 2024-10-06 ASSESSMENT — LIFESTYLE VARIABLES: SUBSTANCE_ABUSE: 0

## 2024-10-06 ASSESSMENT — ENCOUNTER SYMPTOMS
NECK PAIN: 0
DIZZINESS: 0
NAUSEA: 0
HEMOPTYSIS: 0
FEVER: 0
VOMITING: 0
ABDOMINAL PAIN: 0
NERVOUS/ANXIOUS: 0
EYE PAIN: 0
SPUTUM PRODUCTION: 0
PALPITATIONS: 0
CLAUDICATION: 0
ORTHOPNEA: 0
DIARRHEA: 0
CHILLS: 0
PHOTOPHOBIA: 0
DOUBLE VISION: 0
HALLUCINATIONS: 0
BACK PAIN: 0
SHORTNESS OF BREATH: 0
INSOMNIA: 0
EYE DISCHARGE: 0
HEADACHES: 0

## 2024-10-06 ASSESSMENT — FIBROSIS 4 INDEX: FIB4 SCORE: 1.61

## 2024-10-07 ENCOUNTER — PHARMACY VISIT (OUTPATIENT)
Dept: PHARMACY | Facility: MEDICAL CENTER | Age: 69
End: 2024-10-07
Payer: COMMERCIAL

## 2024-10-07 ENCOUNTER — APPOINTMENT (OUTPATIENT)
Dept: RADIOLOGY | Facility: MEDICAL CENTER | Age: 69
End: 2024-10-07
Attending: STUDENT IN AN ORGANIZED HEALTH CARE EDUCATION/TRAINING PROGRAM
Payer: MEDICARE

## 2024-10-07 ENCOUNTER — APPOINTMENT (OUTPATIENT)
Dept: CARDIOLOGY | Facility: MEDICAL CENTER | Age: 69
End: 2024-10-07
Attending: STUDENT IN AN ORGANIZED HEALTH CARE EDUCATION/TRAINING PROGRAM
Payer: MEDICARE

## 2024-10-07 VITALS
RESPIRATION RATE: 16 BRPM | WEIGHT: 173.28 LBS | OXYGEN SATURATION: 92 % | HEART RATE: 63 BPM | TEMPERATURE: 98 F | DIASTOLIC BLOOD PRESSURE: 44 MMHG | BODY MASS INDEX: 28.87 KG/M2 | HEIGHT: 65 IN | SYSTOLIC BLOOD PRESSURE: 95 MMHG

## 2024-10-07 LAB
ANION GAP SERPL CALC-SCNC: 12 MMOL/L (ref 7–16)
BUN SERPL-MCNC: 10 MG/DL (ref 8–22)
CALCIUM SERPL-MCNC: 9.2 MG/DL (ref 8.5–10.5)
CHLORIDE SERPL-SCNC: 87 MMOL/L (ref 96–112)
CO2 SERPL-SCNC: 26 MMOL/L (ref 20–33)
CREAT SERPL-MCNC: 0.82 MG/DL (ref 0.5–1.4)
ERYTHROCYTE [DISTWIDTH] IN BLOOD BY AUTOMATED COUNT: 36.6 FL (ref 35.9–50)
GFR SERPLBLD CREATININE-BSD FMLA CKD-EPI: 78 ML/MIN/1.73 M 2
GLUCOSE SERPL-MCNC: 111 MG/DL (ref 65–99)
HCT VFR BLD AUTO: 39.1 % (ref 37–47)
HGB BLD-MCNC: 14.3 G/DL (ref 12–16)
LV EJECT FRACT MOD 2C 99903: 71.07
LV EJECT FRACT MOD 4C 99902: 63.08
LV EJECT FRACT MOD BP 99901: 61.66
MCH RBC QN AUTO: 32.6 PG (ref 27–33)
MCHC RBC AUTO-ENTMCNC: 36.6 G/DL (ref 32.2–35.5)
MCV RBC AUTO: 89.3 FL (ref 81.4–97.8)
PLATELET # BLD AUTO: 285 K/UL (ref 164–446)
PMV BLD AUTO: 8.9 FL (ref 9–12.9)
POTASSIUM SERPL-SCNC: 5.2 MMOL/L (ref 3.6–5.5)
RBC # BLD AUTO: 4.38 M/UL (ref 4.2–5.4)
SODIUM SERPL-SCNC: 125 MMOL/L (ref 135–145)
TROPONIN T SERPL-MCNC: 11 NG/L (ref 6–19)
WBC # BLD AUTO: 11.2 K/UL (ref 4.8–10.8)

## 2024-10-07 PROCEDURE — 84484 ASSAY OF TROPONIN QUANT: CPT

## 2024-10-07 PROCEDURE — 93306 TTE W/DOPPLER COMPLETE: CPT

## 2024-10-07 PROCEDURE — 85027 COMPLETE CBC AUTOMATED: CPT

## 2024-10-07 PROCEDURE — A9270 NON-COVERED ITEM OR SERVICE: HCPCS | Performed by: HOSPITALIST

## 2024-10-07 PROCEDURE — 700111 HCHG RX REV CODE 636 W/ 250 OVERRIDE (IP): Mod: JG | Performed by: STUDENT IN AN ORGANIZED HEALTH CARE EDUCATION/TRAINING PROGRAM

## 2024-10-07 PROCEDURE — A9502 TC99M TETROFOSMIN: HCPCS

## 2024-10-07 PROCEDURE — G0378 HOSPITAL OBSERVATION PER HR: HCPCS

## 2024-10-07 PROCEDURE — 93306 TTE W/DOPPLER COMPLETE: CPT | Mod: 26 | Performed by: INTERNAL MEDICINE

## 2024-10-07 PROCEDURE — 700102 HCHG RX REV CODE 250 W/ 637 OVERRIDE(OP): Performed by: STUDENT IN AN ORGANIZED HEALTH CARE EDUCATION/TRAINING PROGRAM

## 2024-10-07 PROCEDURE — 700111 HCHG RX REV CODE 636 W/ 250 OVERRIDE (IP): Mod: JZ | Performed by: HOSPITALIST

## 2024-10-07 PROCEDURE — 80048 BASIC METABOLIC PNL TOTAL CA: CPT

## 2024-10-07 PROCEDURE — 99239 HOSP IP/OBS DSCHRG MGMT >30: CPT | Performed by: HOSPITALIST

## 2024-10-07 PROCEDURE — 700102 HCHG RX REV CODE 250 W/ 637 OVERRIDE(OP): Performed by: HOSPITALIST

## 2024-10-07 PROCEDURE — RXMED WILLOW AMBULATORY MEDICATION CHARGE: Performed by: HOSPITALIST

## 2024-10-07 PROCEDURE — A9270 NON-COVERED ITEM OR SERVICE: HCPCS | Performed by: STUDENT IN AN ORGANIZED HEALTH CARE EDUCATION/TRAINING PROGRAM

## 2024-10-07 PROCEDURE — 700111 HCHG RX REV CODE 636 W/ 250 OVERRIDE (IP)

## 2024-10-07 PROCEDURE — 96375 TX/PRO/DX INJ NEW DRUG ADDON: CPT

## 2024-10-07 RX ORDER — SPIRONOLACTONE 25 MG/1
25 TABLET ORAL DAILY
Qty: 30 TABLET | Refills: 3 | Status: SHIPPED | OUTPATIENT
Start: 2024-10-07

## 2024-10-07 RX ORDER — AMLODIPINE BESYLATE 5 MG/1
5 TABLET ORAL ONCE
Status: DISCONTINUED | OUTPATIENT
Start: 2024-10-07 | End: 2024-10-07

## 2024-10-07 RX ORDER — FUROSEMIDE 10 MG/ML
40 INJECTION INTRAMUSCULAR; INTRAVENOUS ONCE
Status: COMPLETED | OUTPATIENT
Start: 2024-10-07 | End: 2024-10-07

## 2024-10-07 RX ORDER — REGADENOSON 0.08 MG/ML
0.4 INJECTION, SOLUTION INTRAVENOUS ONCE
Status: COMPLETED | OUTPATIENT
Start: 2024-10-07 | End: 2024-10-07

## 2024-10-07 RX ORDER — LABETALOL HYDROCHLORIDE 5 MG/ML
10 INJECTION, SOLUTION INTRAVENOUS EVERY 4 HOURS PRN
Status: DISCONTINUED | OUTPATIENT
Start: 2024-10-07 | End: 2024-10-07 | Stop reason: HOSPADM

## 2024-10-07 RX ORDER — AMINOPHYLLINE 25 MG/ML
100 INJECTION, SOLUTION INTRAVENOUS
Status: DISCONTINUED | OUTPATIENT
Start: 2024-10-07 | End: 2024-10-07 | Stop reason: HOSPADM

## 2024-10-07 RX ORDER — CLONIDINE HYDROCHLORIDE 0.1 MG/1
0.1 TABLET ORAL ONCE
Status: COMPLETED | OUTPATIENT
Start: 2024-10-07 | End: 2024-10-07

## 2024-10-07 RX ORDER — HYDROXYZINE HYDROCHLORIDE 25 MG/1
25 TABLET, FILM COATED ORAL 3 TIMES DAILY PRN
Status: DISCONTINUED | OUTPATIENT
Start: 2024-10-07 | End: 2024-10-07 | Stop reason: HOSPADM

## 2024-10-07 RX ORDER — REGADENOSON 0.08 MG/ML
INJECTION, SOLUTION INTRAVENOUS
Status: COMPLETED
Start: 2024-10-07 | End: 2024-10-07

## 2024-10-07 RX ORDER — IBUPROFEN 600 MG/1
600 TABLET, FILM COATED ORAL EVERY 6 HOURS PRN
Status: COMPLETED | OUTPATIENT
Start: 2024-10-07 | End: 2024-10-07

## 2024-10-07 RX ADMIN — IBUPROFEN 600 MG: 600 TABLET, FILM COATED ORAL at 11:49

## 2024-10-07 RX ADMIN — FUROSEMIDE 40 MG: 10 INJECTION, SOLUTION INTRAVENOUS at 12:55

## 2024-10-07 RX ADMIN — ASPIRIN 81 MG: 81 TABLET, COATED ORAL at 05:26

## 2024-10-07 RX ADMIN — REGADENOSON 0.4 MG: 0.08 INJECTION, SOLUTION INTRAVENOUS at 10:04

## 2024-10-07 RX ADMIN — IBUPROFEN 600 MG: 600 TABLET, FILM COATED ORAL at 01:54

## 2024-10-07 RX ADMIN — CLONIDINE HYDROCHLORIDE 0.1 MG: 0.1 TABLET ORAL at 14:19

## 2024-10-07 RX ADMIN — LABETALOL HYDROCHLORIDE 10 MG: 5 INJECTION, SOLUTION INTRAVENOUS at 11:56

## 2024-10-07 RX ADMIN — ACETAMINOPHEN 650 MG: 325 TABLET ORAL at 03:47

## 2024-10-07 RX ADMIN — ACETAMINOPHEN 650 MG: 325 TABLET ORAL at 00:31

## 2024-10-07 SDOH — ECONOMIC STABILITY: TRANSPORTATION INSECURITY
IN THE PAST 12 MONTHS, HAS LACK OF RELIABLE TRANSPORTATION KEPT YOU FROM MEDICAL APPOINTMENTS, MEETINGS, WORK OR FROM GETTING THINGS NEEDED FOR DAILY LIVING?: PATIENT DECLINED

## 2024-10-07 SDOH — ECONOMIC STABILITY: TRANSPORTATION INSECURITY
IN THE PAST 12 MONTHS, HAS THE LACK OF TRANSPORTATION KEPT YOU FROM MEDICAL APPOINTMENTS OR FROM GETTING MEDICATIONS?: PATIENT DECLINED

## 2024-10-07 ASSESSMENT — LIFESTYLE VARIABLES
ON A TYPICAL DAY WHEN YOU DRINK ALCOHOL HOW MANY DRINKS DO YOU HAVE: 1
CONSUMPTION TOTAL: NEGATIVE
HAVE PEOPLE ANNOYED YOU BY CRITICIZING YOUR DRINKING: NO
AVERAGE NUMBER OF DAYS PER WEEK YOU HAVE A DRINK CONTAINING ALCOHOL: 1
EVER FELT BAD OR GUILTY ABOUT YOUR DRINKING: NO
TOTAL SCORE: 0
TOTAL SCORE: 0
HOW MANY TIMES IN THE PAST YEAR HAVE YOU HAD 5 OR MORE DRINKS IN A DAY: 0
ALCOHOL_USE: YES
EVER HAD A DRINK FIRST THING IN THE MORNING TO STEADY YOUR NERVES TO GET RID OF A HANGOVER: NO
HAVE YOU EVER FELT YOU SHOULD CUT DOWN ON YOUR DRINKING: NO
DOES PATIENT WANT TO STOP DRINKING: NO
TOTAL SCORE: 0

## 2024-10-07 ASSESSMENT — SOCIAL DETERMINANTS OF HEALTH (SDOH)
WITHIN THE LAST YEAR, HAVE YOU BEEN AFRAID OF YOUR PARTNER OR EX-PARTNER?: PATIENT DECLINED
WITHIN THE LAST YEAR, HAVE TO BEEN RAPED OR FORCED TO HAVE ANY KIND OF SEXUAL ACTIVITY BY YOUR PARTNER OR EX-PARTNER?: PATIENT DECLINED
WITHIN THE PAST 12 MONTHS, THE FOOD YOU BOUGHT JUST DIDN'T LAST AND YOU DIDN'T HAVE MONEY TO GET MORE: PATIENT DECLINED
IN THE PAST 12 MONTHS, HAS THE ELECTRIC, GAS, OIL, OR WATER COMPANY THREATENED TO SHUT OFF SERVICE IN YOUR HOME?: PATIENT DECLINED
WITHIN THE PAST 12 MONTHS, YOU WORRIED THAT YOUR FOOD WOULD RUN OUT BEFORE YOU GOT THE MONEY TO BUY MORE: PATIENT DECLINED
WITHIN THE LAST YEAR, HAVE YOU BEEN KICKED, HIT, SLAPPED, OR OTHERWISE PHYSICALLY HURT BY YOUR PARTNER OR EX-PARTNER?: PATIENT DECLINED
WITHIN THE LAST YEAR, HAVE YOU BEEN HUMILIATED OR EMOTIONALLY ABUSED IN OTHER WAYS BY YOUR PARTNER OR EX-PARTNER?: PATIENT DECLINED

## 2024-10-07 ASSESSMENT — PAIN DESCRIPTION - PAIN TYPE
TYPE: ACUTE PAIN

## 2024-10-07 ASSESSMENT — PATIENT HEALTH QUESTIONNAIRE - PHQ9
2. FEELING DOWN, DEPRESSED, IRRITABLE, OR HOPELESS: NOT AT ALL
SUM OF ALL RESPONSES TO PHQ9 QUESTIONS 1 AND 2: 0
1. LITTLE INTEREST OR PLEASURE IN DOING THINGS: NOT AT ALL

## 2024-10-07 ASSESSMENT — FIBROSIS 4 INDEX: FIB4 SCORE: 1.61

## 2024-10-08 ENCOUNTER — PATIENT MESSAGE (OUTPATIENT)
Dept: CARDIOLOGY | Facility: MEDICAL CENTER | Age: 69
End: 2024-10-08
Payer: MEDICARE

## 2024-10-08 ENCOUNTER — PATIENT OUTREACH (OUTPATIENT)
Dept: MEDICAL GROUP | Facility: MEDICAL CENTER | Age: 69
End: 2024-10-08
Payer: MEDICARE

## 2024-10-10 ENCOUNTER — PATIENT MESSAGE (OUTPATIENT)
Dept: HEALTH INFORMATION MANAGEMENT | Facility: OTHER | Age: 69
End: 2024-10-10

## 2024-10-11 ENCOUNTER — OFFICE VISIT (OUTPATIENT)
Dept: MEDICAL GROUP | Facility: MEDICAL CENTER | Age: 69
End: 2024-10-11
Payer: MEDICARE

## 2024-10-11 VITALS
OXYGEN SATURATION: 97 % | TEMPERATURE: 97.2 F | HEIGHT: 64 IN | BODY MASS INDEX: 27.9 KG/M2 | SYSTOLIC BLOOD PRESSURE: 110 MMHG | DIASTOLIC BLOOD PRESSURE: 64 MMHG | HEART RATE: 68 BPM | WEIGHT: 163.4 LBS

## 2024-10-11 DIAGNOSIS — I25.83 CORONARY ARTERY DISEASE DUE TO LIPID RICH PLAQUE: ICD-10-CM

## 2024-10-11 DIAGNOSIS — Z95.1 S/P CABG X 3: ICD-10-CM

## 2024-10-11 DIAGNOSIS — I70.0 ARTERIOSCLEROSIS OF AORTA (HCC): ICD-10-CM

## 2024-10-11 DIAGNOSIS — I25.10 CORONARY ARTERY DISEASE DUE TO LIPID RICH PLAQUE: ICD-10-CM

## 2024-10-11 DIAGNOSIS — Z09 HOSPITAL DISCHARGE FOLLOW-UP: Primary | ICD-10-CM

## 2024-10-11 DIAGNOSIS — I50.32 CHRONIC HEART FAILURE WITH PRESERVED EJECTION FRACTION (HFPEF, >= 50%) (HCC): ICD-10-CM

## 2024-10-11 ASSESSMENT — FIBROSIS 4 INDEX: FIB4 SCORE: 1.48

## 2024-10-19 PROCEDURE — RXMED WILLOW AMBULATORY MEDICATION CHARGE

## 2024-10-21 PROCEDURE — RXMED WILLOW AMBULATORY MEDICATION CHARGE: Performed by: INTERNAL MEDICINE

## 2024-10-23 ENCOUNTER — PATIENT MESSAGE (OUTPATIENT)
Dept: CARDIOLOGY | Facility: MEDICAL CENTER | Age: 69
End: 2024-10-23
Payer: MEDICARE

## 2024-10-23 ENCOUNTER — PHARMACY VISIT (OUTPATIENT)
Dept: PHARMACY | Facility: MEDICAL CENTER | Age: 69
End: 2024-10-23
Payer: COMMERCIAL

## 2024-10-23 DIAGNOSIS — I10 PRIMARY HYPERTENSION: ICD-10-CM

## 2024-10-23 DIAGNOSIS — I25.10 CORONARY ARTERY DISEASE INVOLVING NATIVE CORONARY ARTERY OF NATIVE HEART WITHOUT ANGINA PECTORIS: ICD-10-CM

## 2024-10-24 ENCOUNTER — PATIENT MESSAGE (OUTPATIENT)
Dept: CARDIOLOGY | Facility: MEDICAL CENTER | Age: 69
End: 2024-10-24
Payer: MEDICARE

## 2024-10-28 RX ORDER — OLMESARTAN MEDOXOMIL 40 MG/1
20 TABLET ORAL DAILY
Qty: 90 TABLET | Refills: 0
Start: 2024-10-28

## 2024-11-04 ENCOUNTER — PATIENT OUTREACH (OUTPATIENT)
Dept: HEALTH INFORMATION MANAGEMENT | Facility: OTHER | Age: 69
End: 2024-11-04
Payer: MEDICARE

## 2024-11-04 ENCOUNTER — PATIENT MESSAGE (OUTPATIENT)
Dept: HEALTH INFORMATION MANAGEMENT | Facility: OTHER | Age: 69
End: 2024-11-04

## 2024-11-04 DIAGNOSIS — I10 PRIMARY HYPERTENSION: ICD-10-CM

## 2024-11-04 SDOH — HEALTH STABILITY: PHYSICAL HEALTH
HOW OFTEN DO YOU NEED TO HAVE SOMEONE HELP YOU WHEN YOU READ INSTRUCTIONS, PAMPHLETS, OR OTHER WRITTEN MATERIAL FROM YOUR DOCTOR OR PHARMACY?: NEVER

## 2024-11-04 SDOH — ECONOMIC STABILITY: HOUSING INSECURITY: IN THE LAST 12 MONTHS, WAS THERE A TIME WHEN YOU WERE NOT ABLE TO PAY THE MORTGAGE OR RENT ON TIME?: NO

## 2024-11-04 SDOH — SOCIAL STABILITY: SOCIAL INSECURITY: WITHIN THE LAST YEAR, HAVE YOU BEEN HUMILIATED OR EMOTIONALLY ABUSED IN OTHER WAYS BY YOUR PARTNER OR EX-PARTNER?: NO

## 2024-11-04 SDOH — SOCIAL STABILITY: SOCIAL NETWORK
DO YOU BELONG TO ANY CLUBS OR ORGANIZATIONS SUCH AS CHURCH GROUPS, UNIONS, FRATERNAL OR ATHLETIC GROUPS, OR SCHOOL GROUPS?: YES

## 2024-11-04 SDOH — SOCIAL STABILITY: SOCIAL INSECURITY: WITHIN THE LAST YEAR, HAVE YOU BEEN AFRAID OF YOUR PARTNER OR EX-PARTNER?: NO

## 2024-11-04 SDOH — HEALTH STABILITY: MENTAL HEALTH: HOW MANY DRINKS CONTAINING ALCOHOL DO YOU HAVE ON A TYPICAL DAY WHEN YOU ARE DRINKING?: PATIENT DOES NOT DRINK

## 2024-11-04 SDOH — ECONOMIC STABILITY: FOOD INSECURITY: WITHIN THE PAST 12 MONTHS, THE FOOD YOU BOUGHT JUST DIDN'T LAST AND YOU DIDN'T HAVE MONEY TO GET MORE.: NEVER TRUE

## 2024-11-04 SDOH — SOCIAL STABILITY: SOCIAL INSECURITY
WITHIN THE LAST YEAR, HAVE YOU BEEN RAPED OR FORCED TO HAVE ANY KIND OF SEXUAL ACTIVITY BY YOUR PARTNER OR EX-PARTNER?: NO

## 2024-11-04 SDOH — SOCIAL STABILITY: SOCIAL INSECURITY
WITHIN THE LAST YEAR, HAVE YOU BEEN KICKED, HIT, SLAPPED, OR OTHERWISE PHYSICALLY HURT BY YOUR PARTNER OR EX-PARTNER?: NO

## 2024-11-04 SDOH — ECONOMIC STABILITY: TRANSPORTATION INSECURITY: IN THE PAST 12 MONTHS, HAS LACK OF TRANSPORTATION KEPT YOU FROM MEDICAL APPOINTMENTS OR FROM GETTING MEDICATIONS?: NO

## 2024-11-04 SDOH — ECONOMIC STABILITY: FOOD INSECURITY: WITHIN THE PAST 12 MONTHS, YOU WORRIED THAT YOUR FOOD WOULD RUN OUT BEFORE YOU GOT THE MONEY TO BUY MORE.: NEVER TRUE

## 2024-11-04 SDOH — ECONOMIC STABILITY: HOUSING INSECURITY: AT ANY TIME IN THE PAST 12 MONTHS, WERE YOU HOMELESS OR LIVING IN A SHELTER (INCLUDING NOW)?: NO

## 2024-11-04 SDOH — SOCIAL STABILITY: SOCIAL INSECURITY: ARE YOU MARRIED, WIDOWED, DIVORCED, SEPARATED, NEVER MARRIED, OR LIVING WITH A PARTNER?: DIVORCED

## 2024-11-04 SDOH — HEALTH STABILITY: MENTAL HEALTH: HOW OFTEN DO YOU HAVE SIX OR MORE DRINKS ON ONE OCCASION?: NEVER

## 2024-11-04 SDOH — ECONOMIC STABILITY: INCOME INSECURITY: IN THE PAST 12 MONTHS HAS THE ELECTRIC, GAS, OIL, OR WATER COMPANY THREATENED TO SHUT OFF SERVICES IN YOUR HOME?: NO

## 2024-11-04 SDOH — HEALTH STABILITY: MENTAL HEALTH: HOW OFTEN DO YOU HAVE A DRINK CONTAINING ALCOHOL?: NEVER

## 2024-11-04 SDOH — HEALTH STABILITY: PHYSICAL HEALTH: ON AVERAGE, HOW MANY MINUTES DO YOU ENGAGE IN EXERCISE AT THIS LEVEL?: 20 MIN

## 2024-11-04 SDOH — SOCIAL STABILITY: SOCIAL NETWORK
IN A TYPICAL WEEK, HOW MANY TIMES DO YOU TALK ON THE PHONE WITH FAMILY, FRIENDS, OR NEIGHBORS?: MORE THAN THREE TIMES A WEEK

## 2024-11-04 SDOH — SOCIAL STABILITY: SOCIAL NETWORK: HOW OFTEN DO YOU ATTEND CHURCH OR RELIGIOUS SERVICES?: MORE THAN 4 TIMES PER YEAR

## 2024-11-04 SDOH — SOCIAL STABILITY: SOCIAL NETWORK: HOW OFTEN DO YOU GET TOGETHER WITH FRIENDS OR RELATIVES?: TWICE A WEEK

## 2024-11-04 SDOH — ECONOMIC STABILITY: HOUSING INSECURITY: IN THE PAST 12 MONTHS, HOW MANY TIMES HAVE YOU MOVED WHERE YOU WERE LIVING?: 0

## 2024-11-04 SDOH — SOCIAL STABILITY: SOCIAL NETWORK: HOW OFTEN DO YOU ATTEND MEETINGS OF THE CLUBS OR ORGANIZATIONS YOU BELONG TO?: MORE THAN 4 TIMES PER YEAR

## 2024-11-04 SDOH — HEALTH STABILITY: PHYSICAL HEALTH: ON AVERAGE, HOW MANY DAYS PER WEEK DO YOU ENGAGE IN MODERATE TO STRENUOUS EXERCISE (LIKE A BRISK WALK)?: 1 DAY

## 2024-11-04 SDOH — ECONOMIC STABILITY: FOOD INSECURITY: HOW HARD IS IT FOR YOU TO PAY FOR THE VERY BASICS LIKE FOOD, HOUSING, MEDICAL CARE, AND HEATING?: NOT HARD AT ALL

## 2024-11-04 ASSESSMENT — LIFESTYLE VARIABLES
SKIP TO QUESTIONS 9-10: 1
AUDIT-C TOTAL SCORE: 0

## 2024-11-04 ASSESSMENT — ACTIVITIES OF DAILY LIVING (ADL): LACK_OF_TRANSPORTATION: NO

## 2024-11-04 NOTE — PROGRESS NOTES
Community Health Worker Follow-Up    Reason for outreach: This CHW called the pt to introduce the PCM program.    CHW Interventions: This CHW and the pt discussed the PCM program and the benefits of the program for the pt. CHW and the pt completed the [facility] and the pt has barriers with Physical Activity.    Specific Resources Provided:  Housing/Shelter: n/a  Transportation: n/a  Food: n/a  Financial: n/a  Social Supports: n/a  Other: Strength and balance exercises, Steady Exercises and Check lift for safety, [email or web address] message sent with program details.    Plan: Pt accepted the program and is set for enrollment with PCM nurse Susana on 11/7 @2pm

## 2024-11-07 ENCOUNTER — PATIENT OUTREACH (OUTPATIENT)
Dept: HEALTH INFORMATION MANAGEMENT | Facility: OTHER | Age: 69
End: 2024-11-07
Payer: MEDICARE

## 2024-11-07 DIAGNOSIS — I10 PRIMARY HYPERTENSION: ICD-10-CM

## 2024-11-07 DIAGNOSIS — I50.32 CHRONIC HEART FAILURE WITH PRESERVED EJECTION FRACTION (HFPEF, >= 50%) (HCC): ICD-10-CM

## 2024-11-07 DIAGNOSIS — Z95.1 S/P CABG X 3: ICD-10-CM

## 2024-11-07 DIAGNOSIS — F39 MOOD DISORDER (HCC): ICD-10-CM

## 2024-11-07 DIAGNOSIS — I70.0 ARTERIOSCLEROSIS OF AORTA (HCC): ICD-10-CM

## 2024-11-07 DIAGNOSIS — I25.83 CORONARY ARTERY DISEASE DUE TO LIPID RICH PLAQUE: ICD-10-CM

## 2024-11-07 DIAGNOSIS — I25.10 CORONARY ARTERY DISEASE DUE TO LIPID RICH PLAQUE: ICD-10-CM

## 2024-11-07 NOTE — PROGRESS NOTES
INITIAL CARE MANAGEMENT CARE PLAN/ASSESSMENT   Rony is a 69 year old female that meets all criteria to qualify for the PCM program at current moment. She has verbalized her full name and  as well as given verbal consent to enroll in the PCM program. She is a retired critical care/cardiac registered nurse. Patient has a support system locally that consists of a close friend and her Sabianism. She has a niece in [place] and an estranged sister in [place]. She lives alone in a single story private residence with 5 steps at the entry.  She does not currently have home services (PT/OT/SN) coming to her home. She is Pentecostal. She does not have spiritual beliefs that may affect care given to patient. She does not have an advanced directive. She does have medical equipment at home consisting of a walker, blood pressure cuff, and a pulse ox. She does not have communication barriers.  She no longer drives and has relied on a friend for transportation. She states her friend has gone a long vacation and she is interested in transportation resources. She does use [employer] uber rides for her medical appointments. She eats a low carb low fat diet at home. She is taking medication how they should and when they should. Patient has expressed goals of establishing reliable transportation. Her plan of care includes hypertension and congestive heart failure education.       Medication Self-Management Goals:     Reviewed medications listed below with patient.      Current Outpatient Medications:     Melatonin 5 MG Cap, Take 5-10 mg by mouth at bedtime as needed., Disp: , Rfl:     olmesartan (BENICAR) 40 MG Tab, Take 0.5 Tablets by mouth every day., Disp: 90 Tablet, Rfl: 0    spironolactone (ALDACTONE) 25 MG Tab, Take 1 Tablet by mouth every day., Disp: 30 Tablet, Rfl: 3    citalopram (CELEXA) 40 MG Tab, Take 1 Tablet by mouth every day., Disp: 100 Tablet, Rfl: 3    alendronate (FOSAMAX) 70 MG Tab, Take 1 Tablet by mouth every 7 days., Disp:  12 Tablet, Rfl: 3    aspirin 81 MG EC tablet, Take 1 tablet by mouth every day., Disp: 100 Tablet, Rfl: 3    metoprolol SR (TOPROL XL) 25 MG TABLET SR 24 HR, Take 1 Tablet by mouth every day., Disp: 100 Tablet, Rfl: 3    rosuvastatin (CRESTOR) 20 MG Tab, Take 1 tablet by mouth every evening., Disp: 100 Tablet, Rfl: 1    nitroglycerin (NITROSTAT) 0.4 MG SL Tab, Place 1 Tablet under the tongue one time as needed for Chest Pain for up to 1 dose., Disp: 12 Tablet, Rfl: 3    diphenhydrAMINE (BENADRYL) 25 MG Tab, Take 25 mg by mouth at bedtime., Disp: , Rfl:     acetaminophen (TYLENOL) 500 MG Tab, Take 500 mg by mouth every 6 hours as needed for Mild Pain., Disp: , Rfl:     Semaglutide (WEGOVY) 0.5 MG/0.5ML Solution Auto-injector Pen-injector, Inject 0.5 mL under the skin every 7 days. (Patient not taking: Reported on 11/7/2024), Disp: 6 mL, Rfl: 3     Goal: Continue to manage medications independently       Physical/Functional/Environmental Status:     Activities of Daily Living:  Bathing: independent   Dressing: independent  Grooming: independent  Mouth Care: independent  Toileting: independent  Climbing a Flight of Stairs: independent    Independent Activities of Daily Living:  Shopping: independent  Cooking: independent  Managing Medications: independent  Using the phone and looking up numbers: independent  Driving or using public transportation: needs assistance  Managing Finances: independent        11/7/2024     2:36 PM 11/7/2024     2:47 PM   STEADI Fall Risk   STEADI Risk for Falling Score  9   One or more falls in the last year Yes Yes   Advised to use a cane or walker to get around safely  Yes   Feels unsteady when walking  No   Steadies self on furniture while walking at home  No   Worried about falling  Yes   Needs to push with hands when rising from a chair  Yes   Has trouble stepping up onto a curb / using stairs  No   Often has to rush to the toilet  No   Has lost some feeling in feet  No   Takes medicine  that makes him/her feel lightheaded or more tired than usual  Yes   Takes medicine to sleep or improve mood  Yes   Often feels sad or depressed  Yes     Goal: Fall prevention     Social Determinants of Health       Financial Status:      Financial Resource Strain: Low Risk  (11/4/2024)    Overall Financial Resource Strain (CARDIA)     Difficulty of Paying Living Expenses: Not hard at all         Referred to CHW/SW:  NA       Transportation Status:      Transportation Needs: No Transportation Needs (11/4/2024)    PRAPARE - Transportation     Lack of Transportation (Medical): No     Lack of Transportation (Non-Medical): No   Recent Concern: Transportation Needs - Unmet Transportation Needs (9/30/2024)    PRAPARE - Transportation     Lack of Transportation (Medical): No     Lack of Transportation (Non-Medical): Yes        Referred to CHW/SW:  Yes       Food Insecurity:      Food Insecurity: No Food Insecurity (11/4/2024)    Hunger Vital Sign     Worried About Running Out of Food in the Last Year: Never true     Ran Out of Food in the Last Year: Never true        Referred to CHW/SW:  NA       Housing Status:     Housing Stability: Low Risk  (11/4/2024)    Housing Stability Vital Sign     Unable to Pay for Housing in the Last Year: No     Number of Times Moved in the Last Year: 0     Homeless in the Last Year: No        Referred to CHW/SW:  NA      Social Connections:     Social Connections: Moderately Integrated (11/4/2024)    Social Connection and Isolation Panel [NHANES]     Frequency of Communication with Friends and Family: More than three times a week     Frequency of Social Gatherings with Friends and Family: Twice a week     Attends Sabianist Services: More than 4 times per year     Active Member of Clubs or Organizations: Yes     Attends Club or Organization Meetings: More than 4 times per year     Marital Status:         Referred to CHW/SW:  NA      Mental/Behavioral/Psychosocial Status:         1/25/2024    11:40 AM 5/1/2024     3:00 PM 10/7/2024    12:41 AM   Depression Screen (PHQ-2/PHQ-9)   PHQ-2 Total Score   0   PHQ-2 Total Score 0 0        Interpretation of PHQ-9 Total Score   Score Severity   1-4 No Depression   5-9 Mild Depression   10-14 Moderate Depression   15-19 Moderately Severe Depression   20-27 Severe Depression       Goal:  Denies depression at this time. Feels she is stable on current Celexa dose. Continue to monitor     Review of Benefits    This service is an included benefit with your insurance plan, for all other benefits a copy of the website and phone number have been provided for any questions.    Phone Number and Website provided for questions:    [place]:  [phone]   [email or web address]    Advance Planning    Do you have an Advance Directive?  No    (If no, a copy can be provided for patient.)    Would you like an Advance Directive Packet sent to you? Will review advanced Directive information at first monthly outreach       Chronic Care Management Care Plan         Care Plans       Chronic Care Management (CMS)    Met: 0 of 15 Met: 0 of 15      No Outcome (15)       Demonstrate Knowledge of Hypertension/long term (Knowledge deficit of hypertension)  Disciplines:  Interdisciplinary  Expected end:  -     Demonstrate factors that can contribute to high blood pressure/long term (Knowledge deficit of factors contributing to hypertension)  Disciplines:  Interdisciplinary  Expected end:  -     Identify which modifiable health habits can be modified/long term  (Recognize current health habits that may contribute to hypertension)  Disciplines:  Interdisciplinary  Expected end:  -     Identify barriers to managing blood pressure/long term (Patient barriers to managing high blood pressure)  Disciplines:  Interdisciplinary  Expected end:  -     Demonstrate the elements of self-monitoring blood pressure/short term  (Knowledge deficit of self-monitoring blood  pressure)  Disciplines:  Interdisciplinary  Expected end:  -     Improve medication adherence/short term (Adherence to prescribed medications)  Disciplines:  Interdisciplinary  Expected end:  -     Increase understanding of cardiac function/disease/failure/long term (Knowledge deficient regarding condition, treatment regimen, and self care)  Disciplines:  Nurse, Interdisciplinary  Expected end:  -     Learn to Manage Calories/long term  (Diet Management)  Disciplines:  Nurse, Interdisciplinary  Expected end:  -     HP Increase understanding of congestive heart failure/long term  (Knowledge deficit of congestive heart failure disease process)  Disciplines:  Nurse, Interdisciplinary  Expected end:  -     Limit dietary sodium/short term  (Low Sodium Diet Management)  Disciplines:  Nurse, Interdisciplinary  Expected end:  -     Establish a plan for excess fluid management/short term (Excess Fluid)  Disciplines:  Nurse, Interdisciplinary  Expected end:  -     Consistently take Medications as Prescribed/short term  (Med Adherence)  Disciplines:  Nurse, Interdisciplinary  Expected end:  -     Establish resources to assist with medical costs/short term  (Med Adherence)  Disciplines:  Nurse, Interdisciplinary  Expected end:  -     Exercise a designated amount of time daily/ short term  (Patient is Inactive)  Disciplines:  Nurse, Interdisciplinary  Expected end:  -     Patient Stated Goal: Establish reliable transportation (Patient Stated Problem: Patient no longer drives)  Disciplines:  Nurse, Interdisciplinary  Expected end:  -   Patient-stated:  Yes                                       Discussion of Self Management of Care:   Personal Care Management Program and Services  Healthcare goals  Call schedule     Barriers to Goals:  Age  Progression of disease process  Habits     Resources Provided: CHW mailed home/chair exercised. CHW referral for transportation resources     Next Scheduled patient outreach: 1 month

## 2024-11-08 ENCOUNTER — PATIENT OUTREACH (OUTPATIENT)
Dept: HEALTH INFORMATION MANAGEMENT | Facility: OTHER | Age: 69
End: 2024-11-08
Payer: MEDICARE

## 2024-11-08 DIAGNOSIS — I10 PRIMARY HYPERTENSION: ICD-10-CM

## 2024-11-08 NOTE — PROGRESS NOTES
CHW tried calling the pt to discuss resources needed as requested by the PCM nurse. Pt did not answer and this CHW left a VM requesting a return call. 11/8  CHW tried calling the pt to discuss resources needed as requested by the PCM nurse. Pt did not answer and this CHW left a VM requesting a return call. 11/8  Community Health Worker Follow-Up    Reason for outreach: CHW tried calling the pt to discuss resources.     CHW Interventions: This CHW made several attempts to reach the pt and discuss resources requested for transportation.     Specific Resources Provided:  Housing/Shelter: n/a  Transportation: [employer] [place], [facility], [facility], All [employer] programs and Flex ride and others  Food: n/a  Financial: n/a  Social Supports: n/a  Other: n/a    Plan: CHW will send the resources out that was requested by the PCM nurse.

## 2024-11-12 ENCOUNTER — PATIENT MESSAGE (OUTPATIENT)
Dept: CARDIOLOGY | Facility: MEDICAL CENTER | Age: 69
End: 2024-11-12
Payer: MEDICARE

## 2024-11-12 ENCOUNTER — PHARMACY VISIT (OUTPATIENT)
Dept: PHARMACY | Facility: MEDICAL CENTER | Age: 69
End: 2024-11-12
Payer: COMMERCIAL

## 2024-11-12 DIAGNOSIS — I24.0 ISCHEMIC HEART DISEASE DUE TO CORONARY ARTERY OBSTRUCTION (HCC): ICD-10-CM

## 2024-11-12 DIAGNOSIS — I25.9 ISCHEMIC HEART DISEASE DUE TO CORONARY ARTERY OBSTRUCTION (HCC): ICD-10-CM

## 2024-11-12 DIAGNOSIS — I50.9 CHRONIC HEART FAILURE, UNSPECIFIED HEART FAILURE TYPE (HCC): ICD-10-CM

## 2024-11-12 PROCEDURE — RXMED WILLOW AMBULATORY MEDICATION CHARGE: Performed by: HOSPITALIST

## 2024-11-12 PROCEDURE — RXOTC WILLOW AMBULATORY OTC CHARGE

## 2024-11-12 PROCEDURE — RXMED WILLOW AMBULATORY MEDICATION CHARGE: Performed by: INTERNAL MEDICINE

## 2024-11-12 RX ORDER — FUROSEMIDE 20 MG/1
20 TABLET ORAL DAILY
Qty: 3 TABLET | Refills: 0 | Status: SHIPPED | OUTPATIENT
Start: 2024-11-12 | End: 2024-11-21 | Stop reason: SDUPTHER

## 2024-11-12 NOTE — TELEPHONE ENCOUNTER
Israel SNIDER M.D.  You18 minutes ago (2:29 PM)     JASON  I would like to see her in the office next week. In the interim, given weight gain and distended abdomen, we can start oral lasix 20mg daily x 3 days if patient is agreeable. I would like to see her in the office before continuing this medication for longer duration.    JASON

## 2024-11-12 NOTE — TELEPHONE ENCOUNTER
MK- Please advise, patient stating she has gained 3 pounds in 2 days. Taking her spironolactone as prescribed. Patient reporting her abdomen is larger and has a cough. Patient stating she has been adhering to fluid restriction and diet. Vitals stable. Patient asking if her spironolactone should be increased to 50mg daily? Does have a f/u with you next week. Any other recs? Thank you.

## 2024-11-18 PROCEDURE — RXMED WILLOW AMBULATORY MEDICATION CHARGE: Performed by: INTERNAL MEDICINE

## 2024-11-19 ENCOUNTER — PHARMACY VISIT (OUTPATIENT)
Dept: PHARMACY | Facility: MEDICAL CENTER | Age: 69
End: 2024-11-19
Payer: COMMERCIAL

## 2024-11-21 ENCOUNTER — OFFICE VISIT (OUTPATIENT)
Dept: CARDIOLOGY | Facility: MEDICAL CENTER | Age: 69
End: 2024-11-21
Attending: INTERNAL MEDICINE
Payer: MEDICARE

## 2024-11-21 ENCOUNTER — TELEPHONE (OUTPATIENT)
Dept: CARDIOLOGY | Facility: MEDICAL CENTER | Age: 69
End: 2024-11-21

## 2024-11-21 ENCOUNTER — PATIENT MESSAGE (OUTPATIENT)
Dept: CARDIOLOGY | Facility: MEDICAL CENTER | Age: 69
End: 2024-11-21

## 2024-11-21 VITALS
RESPIRATION RATE: 14 BRPM | DIASTOLIC BLOOD PRESSURE: 58 MMHG | BODY MASS INDEX: 28 KG/M2 | OXYGEN SATURATION: 96 % | HEART RATE: 64 BPM | SYSTOLIC BLOOD PRESSURE: 100 MMHG | HEIGHT: 64 IN | WEIGHT: 164 LBS

## 2024-11-21 DIAGNOSIS — I24.0 ISCHEMIC HEART DISEASE DUE TO CORONARY ARTERY OBSTRUCTION (HCC): ICD-10-CM

## 2024-11-21 DIAGNOSIS — I70.0 ARTERIOSCLEROSIS OF AORTA (HCC): ICD-10-CM

## 2024-11-21 DIAGNOSIS — Z95.1 S/P CABG X 3: ICD-10-CM

## 2024-11-21 DIAGNOSIS — I10 PRIMARY HYPERTENSION: ICD-10-CM

## 2024-11-21 DIAGNOSIS — I25.9 ISCHEMIC HEART DISEASE DUE TO CORONARY ARTERY OBSTRUCTION (HCC): ICD-10-CM

## 2024-11-21 DIAGNOSIS — I50.32 CHRONIC HEART FAILURE WITH PRESERVED EJECTION FRACTION (HFPEF, >= 50%) (HCC): ICD-10-CM

## 2024-11-21 DIAGNOSIS — E78.2 MIXED HYPERLIPIDEMIA: ICD-10-CM

## 2024-11-21 PROCEDURE — 3078F DIAST BP <80 MM HG: CPT | Performed by: INTERNAL MEDICINE

## 2024-11-21 PROCEDURE — 3074F SYST BP LT 130 MM HG: CPT | Performed by: INTERNAL MEDICINE

## 2024-11-21 PROCEDURE — RXMED WILLOW AMBULATORY MEDICATION CHARGE: Performed by: INTERNAL MEDICINE

## 2024-11-21 PROCEDURE — 99213 OFFICE O/P EST LOW 20 MIN: CPT | Performed by: INTERNAL MEDICINE

## 2024-11-21 PROCEDURE — G2211 COMPLEX E/M VISIT ADD ON: HCPCS | Performed by: INTERNAL MEDICINE

## 2024-11-21 PROCEDURE — 99215 OFFICE O/P EST HI 40 MIN: CPT | Performed by: INTERNAL MEDICINE

## 2024-11-21 RX ORDER — EMPAGLIFLOZIN 10 MG/1
10 TABLET, FILM COATED ORAL DAILY
Qty: 90 TABLET | Refills: 3 | Status: SHIPPED | OUTPATIENT
Start: 2024-11-21

## 2024-11-21 RX ORDER — FUROSEMIDE 20 MG/1
20 TABLET ORAL
Qty: 60 TABLET | Refills: 1 | Status: SHIPPED | OUTPATIENT
Start: 2024-11-21

## 2024-11-21 ASSESSMENT — FIBROSIS 4 INDEX: FIB4 SCORE: 1.48

## 2024-11-21 NOTE — PROGRESS NOTES
Cardiology Follow Up Consultation Note    Date of note:    11/21/2024  Primary Care Provider: No primary care provider on file.  Referring Provider: No ref. provider found    Patient Name: Rony Chilel   YOB: 1955  MRN:              [id number]    Chief Complaint   Patient presents with    Follow-Up     Ischemic heart disease due to coronary artery obstruction (HCC)           History of Present Illness:  Ms. Rony Chilel is a 69-year-old woman with past medical history significant for coronary artery disease status post PCI to RCA, severe multivessel CAD status post CABG x 3, hypertension, hyperlipidemia, HFpEF who presents to the cardiology office to for follow-up.    She initially presented to the office on 1/30/2024 to establish care with our practice.  Her cardiac history dates back to 5/11/2022 when she was admitted to [facility] with chest pain.  Found to have inferior STEMI and taken to the Cath Lab.  Noted to have under percent occlusion of proximal RCA and 80% distal occlusion of RCA that was treated with ALCON.  Had residual obstructive disease involving LAD and LCx.  Underwent CABG x 3 (LIMA to LAD, SVG to D1, SVG to OM1).    Since her last office visit, she was admitted to the hospital back in October 2024 with symptoms of jaw pain in the setting of hypertensive emergency and mild volume overload.  She was admitted to the hospital for observation.  Was started on antihypertensive medications for blood pressure control.  Also received diuretics given elevated NT proBNP.  Had ischemic testing with MPI which was negative for ischemia.  Echocardiogram revealed preserved LV function however, she was found to have grade 3 diastolic dysfunction.  Eventually discharged home in stable condition on as needed low-dose Lasix 20 mg and spironolactone.    Interval history:  Since discharge home, she has been taking her cardiac medications as prescribed.  Has not noticed abdominal  bloating or lower extremity edema.  She has not had recurrent anginal symptoms.  Blood pressure has been stable and at goal on current therapies.      Cardiovascular Risk Factors:  1. Smoking status: Former smoker. Quit in 2022  2. Type II Diabetes Mellitus: Denies   Lab Results   Component Value Date/Time    HBA1C 5.2 10/06/2024 10:31 PM    HBA1C 5.1 01/09/2024 10:21 AM     3. Hypertension: Yes  4. Dyslipidemia: Yes   Cholesterol,Tot   Date Value Ref Range Status   01/09/2024 121 100 - 199 mg/dL Final     LDL   Date Value Ref Range Status   01/09/2024 43 <100 mg/dL Final     HDL   Date Value Ref Range Status   01/09/2024 47 >=40 mg/dL Final     Triglycerides   Date Value Ref Range Status   01/09/2024 155 (H) 0 - 149 mg/dL Final     5. Family history of early Coronary Artery Disease in a first degree relative (Male less than 55 years of age; Female less than 65 years of age): Denies      Review of Systems:  As per HPI. All other systems reviewed and are negative.      Past Medical History:   Diagnosis Date    Allergy     CAD (coronary artery disease)     Heart attack (HCC)     Hyperlipidemia     Hypertension     PONV (postoperative nausea and vomiting)     Psychiatric disorder     Depression, PTSD, anxiety         Past Surgical History:   Procedure Laterality Date    MULTIPLE CORONARY ARTERY BYPASS ENDO VEIN HARVEST  6/30/2022    Procedure: CABG, WITH ENDOSCOPIC VEIN PROCUREMENT-X3;  Surgeon: Irvin Lama D.O.;  Location: SURGERY [facility];  Service: Cardiothoracic    ECHOCARDIOGRAM, TRANSESOPHAGEAL, INTRAOPERATIVE  6/30/2022    Procedure: ECHOCARDIOGRAM, TRANSESOPHAGEAL, INTRAOPERATIVE;  Surgeon: Irvin Lama D.O.;  Location: SURGERY [facility];  Service: Cardiothoracic    CHOLECYSTECTOMY      Open    MAMMOPLASTY AUGMENTATION Bilateral          Current Outpatient Medications   Medication Sig Dispense Refill    Empagliflozin (JARDIANCE) 10 MG Tab tablet Take 1 Tablet by mouth every day. 90  Tablet 3    furosemide (LASIX) 20 MG Tab Take 1 Tablet by mouth 1 time a day as needed (As needed for weight gain or edema). 60 Tablet 1    Melatonin 5 MG Cap Take 5-10 mg by mouth at bedtime as needed.      olmesartan (BENICAR) 40 MG Tab Take 0.5 Tablets by mouth every day. 90 Tablet 0    spironolactone (ALDACTONE) 25 MG Tab Take 1 Tablet by mouth every day. 30 Tablet 3    citalopram (CELEXA) 40 MG Tab Take 1 Tablet by mouth every day. 100 Tablet 3    alendronate (FOSAMAX) 70 MG Tab Take 1 Tablet by mouth every 7 days. 12 Tablet 3    aspirin 81 MG EC tablet Take 1 tablet by mouth every day. 100 Tablet 3    metoprolol SR (TOPROL XL) 25 MG TABLET SR 24 HR Take 1 Tablet by mouth every day. 100 Tablet 3    rosuvastatin (CRESTOR) 20 MG Tab Take 1 tablet by mouth every evening. 100 Tablet 1    nitroglycerin (NITROSTAT) 0.4 MG SL Tab Place 1 Tablet under the tongue one time as needed for Chest Pain for up to 1 dose. 12 Tablet 3    diphenhydrAMINE (BENADRYL) 25 MG Tab Take 25 mg by mouth at bedtime.      acetaminophen (TYLENOL) 500 MG Tab Take 500 mg by mouth every 6 hours as needed for Mild Pain.       No current facility-administered medications for this visit.         Allergies   Allergen Reactions    Oxycodone Itching    Ticagrelor Rash and Itching          Morphine Vomiting and Nausea    Hydromorphone Itching         Family History   Problem Relation Age of Onset    Arterial Aneurysm Mother     Alcohol abuse Mother     Diabetes Father     Heart Disease Father     Psychiatric Illness Paternal Grandmother         Paronoid schizophrenia    Cancer Sister         Biliary cancer, carolis disease    Hyperlipidemia Brother         Htn, mi, Cabg         Social History     Socioeconomic History    Marital status:      Spouse name: Not on file    Number of children: Not on file    Years of education: Not on file    Highest education level: Bachelor's degree (e.g., BA, AB, BS)   Occupational History    Not on file    Tobacco Use    Smoking status: Every Day     Current packs/day: 0.00     Average packs/day: 0.5 packs/day for 15.0 years (7.5 ttl pk-yrs)     Types: Cigarettes     Start date: 2007     Last attempt to quit: 2022     Years since quittin.4    Smokeless tobacco: Never   Vaping Use    Vaping status: Never Used   Substance and Sexual Activity    Alcohol use: Not Currently     Comment: social    Drug use: Yes     Frequency: 1.0 times per week     Types: Marijuana     Comment: edibles, marijuana    Sexual activity: Not Currently     Birth control/protection: Abstinence   Other Topics Concern    Not on file   Social History Narrative    Not on file     Social Drivers of Health     Financial Resource Strain: Low Risk  (2024)    Overall Financial Resource Strain (CARDIA)     Difficulty of Paying Living Expenses: Not hard at all   Food Insecurity: No Food Insecurity (2024)    Hunger Vital Sign     Worried About Running Out of Food in the Last Year: Never true     Ran Out of Food in the Last Year: Never true   Transportation Needs: No Transportation Needs (2024)    PRAPARE - Transportation     Lack of Transportation (Medical): No     Lack of Transportation (Non-Medical): No   Recent Concern: Transportation Needs - Unmet Transportation Needs (2024)    PRAPARE - Transportation     Lack of Transportation (Medical): No     Lack of Transportation (Non-Medical): Yes   Physical Activity: Insufficiently Active (2024)    Exercise Vital Sign     Days of Exercise per Week: 1 day     Minutes of Exercise per Session: 20 min   Stress: Stress Concern Present (2024)    [employer] - Occupational Stress Questionnaire     Feeling of Stress : To some extent   Social Connections: Moderately Integrated (2024)    Social Connection and Isolation Panel [NHANES]     Frequency of Communication with Friends and Family: More than three times a week     Frequency of Social  "Gatherings with Friends and Family: Twice a week     Attends Yazdanism Services: More than 4 times per year     Active Member of Clubs or Organizations: Yes     Attends Club or Organization Meetings: More than 4 times per year     Marital Status:    Intimate Partner Violence: Not At Risk (11/4/2024)    Humiliation, Afraid, Rape, and Kick questionnaire     Fear of Current or Ex-Partner: No     Emotionally Abused: No     Physically Abused: No     Sexually Abused: No   Housing Stability: Low Risk  (11/4/2024)    Housing Stability Vital Sign     Unable to Pay for Housing in the Last Year: No     Number of Times Moved in the Last Year: 0     Homeless in the Last Year: No         Physical Exam:  Ambulatory Vitals  /58 (BP Location: Left arm, Patient Position: Sitting, BP Cuff Size: Adult)   Pulse 64   Resp 14   Ht 1.626 m (5' 4\")   Wt 74.4 kg (164 lb)   SpO2 96%    Oxygen Therapy:  Pulse Oximetry: 96 %  BP Readings from Last 4 Encounters:   11/21/24 100/58   10/11/24 110/64   10/07/24 95/44   09/30/24 116/64       Weight/BMI: Body mass index is 28.15 kg/m².  Wt Readings from Last 4 Encounters:   11/21/24 74.4 kg (164 lb)   10/11/24 74.1 kg (163 lb 6.4 oz)   10/07/24 78.6 kg (173 lb 4.5 oz)   09/30/24 76.7 kg (169 lb)         General: Not in acute distress  HEENT: OP clear   Neck:  No carotid bruits, No JVD appreciated  CVS:  RRR, Normal S1, S2. No murmurs, rubs or gallops  Resp: Normal respiratory effort, lungs CTA bilaterally  Abdomen: Soft, non-distended  Skin: No obvious rashes, no cyanosis  Neurological: Alert and oriented x3, moves all extremities, no focal neurologic deficits  Psychiatric: Appropriate affect  Extremities:   Extremities warm, no edema      Lab Data Review:  Lab Results   Component Value Date/Time    CHOLSTRLTOT 121 10/06/2024 10:31 PM    LDL 31 10/06/2024 10:31 PM    HDL 56 10/06/2024 10:31 PM    TRIGLYCERIDE 170 (H) 10/06/2024 10:31 PM       Lab Results   Component Value Date/Time "    SODIUM 125 (L) 10/07/2024 01:04 AM    POTASSIUM 5.2 10/07/2024 01:04 AM    CHLORIDE 87 (L) 10/07/2024 01:04 AM    CO2 26 10/07/2024 01:04 AM    GLUCOSE 111 (H) 10/07/2024 01:04 AM    BUN 10 10/07/2024 01:04 AM    CREATININE 0.82 10/07/2024 01:04 AM    BUNCREATRAT 20.0 05/13/2022 04:30 AM     Lab Results   Component Value Date/Time    ALKPHOSPHAT 96 10/06/2024 10:31 PM    ASTSGOT 24 10/06/2024 10:31 PM    ALTSGPT 15 10/06/2024 10:31 PM    TBILIRUBIN 0.5 10/06/2024 10:31 PM      Lab Results   Component Value Date/Time    WBC 11.2 (H) 10/07/2024 01:04 AM    HEMOGLOBIN 14.3 10/07/2024 01:04 AM     Lab Results   Component Value Date/Time    HBA1C 5.2 10/06/2024 10:31 PM    HBA1C 5.1 01/09/2024 10:21 AM         Cardiac Imaging and Procedures Review:    EKG dated 1/30/2024:   My personal interpretation is sinus, nonspecific ST-T changes     TTE 5/13/2022:  1. The left ventricle is normal size and function. LVEF estimated at 55% by Jett's biplane method. Normal wall thickness. No segmental wall motion abnormalities seen. Grade 1 (impaired relaxation) diastolic dysfunction. No VSD or apical thrombus seen.   2. The right ventricle is normal in size and function. RVSP estimated at 27 mmHg with RAP of 3 mmHg.      Intraoperative SACHA 6/30/2022:  LVEF estimated at 70%. Mild MR. Biatrial enlargement.     Echo 10/7/2024:  Normal left ventricular systolic function.  The ejection fraction is measured to be  63% by Jett's biplane.  Grade III diastolic dysfunction (restrictive pattern).  Reduced right ventricular systolic function by TAPSE (1.4 cm).  Right ventricular systolic pressure is estimated to be 51 mmHg.  Moderately dilated left atrium.  Mild mitral regurgitation.  Mild tricuspid regurgitation.  Mild pulmonic insufficiency.  No pericardial effusion.  Normal inferior vena cava size and inspiratory collapse.    Stress test 10/7/2024:   NUCLEAR IMAGING INTERPRETATION   No evidence of significant jeopardized viable  myocardium or prior myocardial    infarction.   Normal left ventricular size, ejection fraction, and wall motion.   ECG INTERPRETATION   Nondiagnostic due to use of pharmacological stress agent.    Assessment & Plan     1. Ischemic heart disease due to coronary artery obstruction (HCC)  furosemide (LASIX) 20 MG Tab      2. S/P CABG x 3        3. Mixed hyperlipidemia        4. Arteriosclerosis of aorta (HCC)        5. Primary hypertension        6. Chronic heart failure with preserved ejection fraction (HFpEF, >= 50%) ([facility])  Empagliflozin (JARDIANCE) 10 MG Tab tablet    Basic Metabolic Panel            Medical Decision Making:  Ms. Rony Chilel is a 69-year-old woman with past medical history significant for coronary artery disease status post PCI to RCA, severe multivessel CAD status post CABG x 3, hypertension, hyperlipidemia, HFpEF who presents to the cardiology office to for follow-up.    1. Ischemic heart disease due to coronary artery obstruction (HCC)  2. S/P CABG x 3  - Hx of LIMA to LAD, SVG to D1 and SVG to OM1.   - Recent ischemic testing unrevealing.  No evidence of infarct or ischemia.  - Continue metoprolol and aspirin 81 mg daily    3. Mixed hyperlipidemia  4. Arteriosclerosis of aorta (HCC)  -LDL as currently well-controlled and at goal.  No changes needed.  Continue rosuvastatin 20 mg daily.    5. Primary hypertension  -Recent admission with hypertensive urgency/emergency.  Currently doing well.  Blood pressure is at goal.  Continue olmesartan, metoprolol, spironolactone.    6. Chronic heart failure with preserved ejection fraction (HFpEF, >= 50%) ([facility])  -Clinical presentation during last admission consistent with acute HFpEF.  Review of her echocardiogram shows evidence of grade 3 diastolic dysfunction.  Currently without evidence of significant volume overload.  Recommend continuation of spironolactone 25 mg daily.  Will start SGLT2 inhibitor therapy with Jardiance 10 mg daily.  She is been advised  to monitor her weight regularly.  Lasix 20 mg daily as needed for weight gain or edema.  -Check BMP given hyponatremia during hospital admission      A total of 42 minutes of time was spent on day of encounter reviewing medical record, reviewing notes and test results from recent hospitalization, performing history and examination, counseling, ordering medication/test/consults and documentation.      () Today's E/M visit is associated with medical care services that serve as the continuing focal point for all needed health care services and/or with medical care services that  are part of ongoing care related to a patient's single, serious condition, or a complex condition: This includes  furnishing services to patients on an ongoing basis that result in care that is personalized  to the patient. The services result in a comprehensive, longitudinal, and continuous  relationship with the patient and involve delivery of team-based care that is accessible, coordinated with other practitioners and providers, and integrated with the broader health care landscape.       It was a pleasure seeing Ms. Rony Chilel in the office today. Return in about 4 months (around 3/21/2025). Patient is aware to call the cardiology clinic with any questions or concerns.      Israel Mckeon MD, [facility]  Cardiologist, [facility], [address]  [address] [address]  Phone: [phone]  Fax: [phone]    Please note that this dictation was created using voice recognition software. I have made every reasonable attempt to correct obvious errors, but it is possible there are errors of grammar and possibly content that I did not discover before finalizing the note.

## 2024-11-21 NOTE — TELEPHONE ENCOUNTER
Received Erx request via Card[facility]  for Jardiance 10mg tabs . (Quantity: 90 , Day Supply: 90 )     Insurance: [employer]      Ran Test claim via [employer] & medication co-pays are as followed:    $47 for a 30 day supply  $117.50 for a 90 day supply    Patient already fills medications with [facility], but is not currently receiving our specialty services. She is eligible for no cost for delivery as well as receiving her Jardiance for $94 / 90 days delivered to her instead of the $117.50 for .

## 2024-11-22 ENCOUNTER — PHARMACY VISIT (OUTPATIENT)
Dept: PHARMACY | Facility: MEDICAL CENTER | Age: 69
End: 2024-11-22
Payer: COMMERCIAL

## 2024-11-25 PROCEDURE — RXMED WILLOW AMBULATORY MEDICATION CHARGE: Performed by: INTERNAL MEDICINE

## 2024-11-25 NOTE — TELEPHONE ENCOUNTER
Patient responded to message sent for speciality services with [facility]. Patient consented to services and refill adherence calls.    Contacting patient to set up delivery for [employer] and will screen patient for a potential [employer] lizzy that would bring Jardiance co-pay to $0 for the next 11 months if approved (renewable).

## 2024-11-26 PROCEDURE — RXMED WILLOW AMBULATORY MEDICATION CHARGE

## 2024-11-27 NOTE — TELEPHONE ENCOUNTER
Called patient to set up delivery for Jardiance, Alendronate, and Citalopram. Pt consented to delivery and we set them up to be delivered via  on 12/02/24 @ ANY TIME DELIVERY.     I screened the patient to see if she would be eligible for a [employer] lizzy for her Jardiance and she was eligible via income and Dx. Patient provided me with the required information needed to apply for the lizzy for her. I applied and she was approved until 10/27/24 to receive her Jardiance for $0 going forward. I let her know that I would send her the approval documents through [employer] for her records.    I provided her with my direct phone number in case she has any questions going forward and informed her of the refill process when receiving specialty services. Patient consented to the services. I also let her know that her lizzy through the [employer] would also cover her Metoprolol and Spironolactone co-pays as well, so those will also be $0 going forward.    Patient had questions for a Pharmacist regarding the Jardiance, due to being a new medication for her. She has questions regarding hypoglycemia and dehydration since she is also taking furosemide and spironolactone and she is not Diabetic. I let her know that I would have a Pharmacist reach out to discuss her concerns.

## 2024-11-27 NOTE — TELEPHONE ENCOUNTER
Medication: Jardiance, Spironolactone, and Metoprolol  Type of Insurance: Government funded (Medicare/Medicare Advantage)  Type of Financial assistance requested Foundation  Source: [employer]  Source Phone #: [phone]  Outcome: Approved  Effective dates: 10/28/24 until 10/27/25  Details/Billing Information:   [id number]  PCN: [id number]  GRP: [id number]  ID: [id number]  Max Award Amount: $10,000  Final Copay: $0

## 2024-12-01 ENCOUNTER — PHARMACY VISIT (OUTPATIENT)
Dept: PHARMACY | Facility: MEDICAL CENTER | Age: 69
End: 2024-12-01
Payer: COMMERCIAL

## 2024-12-03 ENCOUNTER — PATIENT MESSAGE (OUTPATIENT)
Dept: CARDIOLOGY | Facility: MEDICAL CENTER | Age: 69
End: 2024-12-03
Payer: MEDICARE

## 2024-12-03 NOTE — PATIENT COMMUNICATION
To MK: ~Just FYI. Pt reports that she has stopped taking olmesartan 20mg daily due to low BP. Please see [employer] message. Thank you.

## 2024-12-04 ENCOUNTER — PATIENT OUTREACH (OUTPATIENT)
Dept: HEALTH INFORMATION MANAGEMENT | Facility: OTHER | Age: 69
End: 2024-12-04
Payer: MEDICARE

## 2024-12-04 DIAGNOSIS — Z95.1 S/P CABG X 3: ICD-10-CM

## 2024-12-04 DIAGNOSIS — I25.10 CORONARY ARTERY DISEASE DUE TO LIPID RICH PLAQUE: ICD-10-CM

## 2024-12-04 DIAGNOSIS — I25.83 CORONARY ARTERY DISEASE DUE TO LIPID RICH PLAQUE: ICD-10-CM

## 2024-12-04 DIAGNOSIS — I10 PRIMARY HYPERTENSION: ICD-10-CM

## 2024-12-04 DIAGNOSIS — F39 MOOD DISORDER (HCC): ICD-10-CM

## 2024-12-04 DIAGNOSIS — I50.32 CHRONIC HEART FAILURE WITH PRESERVED EJECTION FRACTION (HFPEF, >= 50%) (HCC): ICD-10-CM

## 2024-12-04 DIAGNOSIS — I70.0 ARTERIOSCLEROSIS OF AORTA (HCC): ICD-10-CM

## 2024-12-04 NOTE — CARE PLAN
Problem: Knowledge deficit of hypertension  Goal: Demonstrate Knowledge of Hypertension/long term  Outcome: Met  Intervention: Define hypertension in lay terms  Intervention: Educate patient on understanding what the blood pressure reading means  Description: provide handout on understanding blood pressure readings  Intervention: Educate patient on harmful effects of high blood pressure  Note: Patient has verbally stated they understand the harmful effects of high blood pressure   Intervention: In conjunction with their medical provider, identify goal blood pressure: 120/80     Problem: Patient is Inactive  Goal: Exercise a designated amount of time daily/ short term   Outcome: Progressing  Intervention: Discuss barriers to activity with patient Update SDOH  Intervention: Develop exercise plan with patient and provide activity tips  Intervention: Explore community resources including walking groups, assistance programs, and home videos

## 2024-12-04 NOTE — PROGRESS NOTES
"Assessment  Spoke with Rony for monthly outreach follow up.  She states she is \"doing the best I can with what I have.\"  She had a nice Thanksgiving at her [employer].  She utilizes [employer] to message cardiology very well.  She checks her weight daily and has a prescription for Lasix as needed for weight gain over 3 pounds.  Blood pressure 3 times a day and strictly adheres to her 1.8 L fluid restriction.  She has started Jardiance this week and has stopped her Benicar.  She continues to monitor her blood pressure will follow-up with cardiology for any changes.  Discussed at length diet changes, cooking with a low-sodium, exercise, and weight loss.  Congratulated Rony on her progress.  Encouraged Rony to seek out activities that bring her sorin.  She is planning a trip with friends in the spring.  She denies any needs at this time.  Encouraged to call with any questions or concerns.    Education and Self-Management of Care  See care plan note     Plan of Care and Goals  Continue HTN and CHF education     Barriers:  Age  Progression of disease process  Habits     Progress:  Progressing     Next outreach:   January 2025                           "

## 2024-12-06 DIAGNOSIS — I25.10 CORONARY ARTERY DISEASE DUE TO LIPID RICH PLAQUE: ICD-10-CM

## 2024-12-06 DIAGNOSIS — I50.32 CHRONIC HEART FAILURE WITH PRESERVED EJECTION FRACTION (HFPEF, >= 50%) (HCC): ICD-10-CM

## 2024-12-06 DIAGNOSIS — Z95.1 S/P CABG X 3: ICD-10-CM

## 2024-12-06 DIAGNOSIS — I70.0 ARTERIOSCLEROSIS OF AORTA (HCC): ICD-10-CM

## 2024-12-06 DIAGNOSIS — I25.83 CORONARY ARTERY DISEASE DUE TO LIPID RICH PLAQUE: ICD-10-CM

## 2024-12-06 RX ORDER — SEMAGLUTIDE 0.68 MG/ML
0.25 INJECTION, SOLUTION SUBCUTANEOUS
Qty: 3 ML | Refills: 11 | Status: SHIPPED | OUTPATIENT
Start: 2024-12-06

## 2025-01-06 ENCOUNTER — PATIENT OUTREACH (OUTPATIENT)
Dept: HEALTH INFORMATION MANAGEMENT | Facility: OTHER | Age: 70
End: 2025-01-06
Payer: MEDICARE

## 2025-01-06 DIAGNOSIS — I10 PRIMARY HYPERTENSION: ICD-10-CM

## 2025-01-06 DIAGNOSIS — I70.0 ARTERIOSCLEROSIS OF AORTA (HCC): ICD-10-CM

## 2025-01-06 DIAGNOSIS — I25.10 CORONARY ARTERY DISEASE DUE TO LIPID RICH PLAQUE: ICD-10-CM

## 2025-01-06 DIAGNOSIS — Z95.1 S/P CABG X 3: ICD-10-CM

## 2025-01-06 DIAGNOSIS — F39 MOOD DISORDER (HCC): ICD-10-CM

## 2025-01-06 DIAGNOSIS — I50.32 CHRONIC HEART FAILURE WITH PRESERVED EJECTION FRACTION (HFPEF, >= 50%) (HCC): ICD-10-CM

## 2025-01-06 DIAGNOSIS — I25.83 CORONARY ARTERY DISEASE DUE TO LIPID RICH PLAQUE: ICD-10-CM

## 2025-01-07 NOTE — PROGRESS NOTES
Assessment  Spoke with Rony for monthly outreach follow up. Elena states she has had a interesting few days. She is having difficulty with her phone. Rony monitors her vital signs daily. She reports her systolic blood pressure is ranging 115-120s. She remains off her olmesartan per communication with Cardiology. Her heart rate is ranging 50s-60s, and oxygen saturation 95-96% on room air. She reports gaining four pound over the holiday season. She took he PRN lasix and is back to 160 pounds. She states she feels she is managing her health well except exercise. Discussed the importance of regular exercise. Discussed different forms of exercise: walking, chair exercises, and resistance bands. Also discussed tips for setting up a regimen such as using a step tracker to monitor progress. Rony denied any needs at this time. Encouraged to call with any questions or concerns.     Education and Self-Management of Care  See care plan note  Discussed increasing exercise  Discussed monitoring vital signs and daily weight    Plan of Care and Goals  Continue HTN and CHF education      Barriers:  Age  Progression of disease process  Habits     Progress:  Progressing     Next outreach:   February 2025

## 2025-01-07 NOTE — CARE PLAN
Problem: Knowledge deficit of factors contributing to hypertension  Goal: Demonstrate factors that can contribute to high blood pressure/long term 11/7/24-5/31/25  Outcome: Progressing  Note: Patient has verbalized understanding of factors contributing to high blood pressure   Intervention: Educate patient on role of physical activity  Description: Refer to community wellness programs     Problem: Patient is Inactive  Goal: Exercise a designated amount of time daily/ short term   Outcome: Progressing  Note: Discussed the importance of regular exercise. Discussed different forms of exercise, walking (using a step tracker to monitor progress), chair exercises, and resistance bands.

## 2025-01-13 ENCOUNTER — APPOINTMENT (OUTPATIENT)
Dept: MEDICAL GROUP | Facility: MEDICAL CENTER | Age: 70
End: 2025-01-13
Payer: MEDICARE

## 2025-01-13 DIAGNOSIS — I25.9 ISCHEMIC HEART DISEASE DUE TO CORONARY ARTERY OBSTRUCTION (HCC): ICD-10-CM

## 2025-01-13 DIAGNOSIS — I24.0 ISCHEMIC HEART DISEASE DUE TO CORONARY ARTERY OBSTRUCTION (HCC): ICD-10-CM

## 2025-01-13 RX ORDER — FUROSEMIDE 20 MG/1
20 TABLET ORAL
Qty: 60 TABLET | Refills: 1 | OUTPATIENT
Start: 2025-01-13

## 2025-01-14 DIAGNOSIS — I25.9 ISCHEMIC HEART DISEASE DUE TO CORONARY ARTERY OBSTRUCTION (HCC): ICD-10-CM

## 2025-01-14 DIAGNOSIS — I50.32 CHRONIC HEART FAILURE WITH PRESERVED EJECTION FRACTION (HFPEF, >= 50%) (HCC): ICD-10-CM

## 2025-01-14 DIAGNOSIS — I10 PRIMARY HYPERTENSION: ICD-10-CM

## 2025-01-14 DIAGNOSIS — I24.0 ISCHEMIC HEART DISEASE DUE TO CORONARY ARTERY OBSTRUCTION (HCC): ICD-10-CM

## 2025-01-14 PROCEDURE — RXMED WILLOW AMBULATORY MEDICATION CHARGE

## 2025-01-15 PROCEDURE — RXMED WILLOW AMBULATORY MEDICATION CHARGE: Performed by: INTERNAL MEDICINE

## 2025-01-15 RX ORDER — FUROSEMIDE 20 MG/1
20 TABLET ORAL
Qty: 100 TABLET | Refills: 0 | Status: SHIPPED | OUTPATIENT
Start: 2025-01-15

## 2025-01-16 ENCOUNTER — PHARMACY VISIT (OUTPATIENT)
Dept: PHARMACY | Facility: MEDICAL CENTER | Age: 70
End: 2025-01-16
Payer: COMMERCIAL

## 2025-01-20 PROCEDURE — RXMED WILLOW AMBULATORY MEDICATION CHARGE: Performed by: INTERNAL MEDICINE

## 2025-01-29 ENCOUNTER — PHARMACY VISIT (OUTPATIENT)
Dept: PHARMACY | Facility: MEDICAL CENTER | Age: 70
End: 2025-01-29
Payer: COMMERCIAL

## 2025-02-03 DIAGNOSIS — I10 PRIMARY HYPERTENSION: ICD-10-CM

## 2025-02-03 PROCEDURE — RXMED WILLOW AMBULATORY MEDICATION CHARGE

## 2025-02-03 RX ORDER — SPIRONOLACTONE 25 MG/1
25 TABLET ORAL DAILY
Qty: 100 TABLET | Refills: 3 | Status: SHIPPED | OUTPATIENT
Start: 2025-02-03

## 2025-02-04 ENCOUNTER — PATIENT MESSAGE (OUTPATIENT)
Dept: CARDIOLOGY | Facility: MEDICAL CENTER | Age: 70
End: 2025-02-04
Payer: MEDICARE

## 2025-02-07 ENCOUNTER — PATIENT OUTREACH (OUTPATIENT)
Dept: HEALTH INFORMATION MANAGEMENT | Facility: OTHER | Age: 70
End: 2025-02-07
Payer: MEDICARE

## 2025-02-07 DIAGNOSIS — I50.32 CHRONIC HEART FAILURE WITH PRESERVED EJECTION FRACTION (HFPEF, >= 50%) (HCC): ICD-10-CM

## 2025-02-07 DIAGNOSIS — I10 PRIMARY HYPERTENSION: ICD-10-CM

## 2025-02-07 DIAGNOSIS — I70.0 ARTERIOSCLEROSIS OF AORTA (HCC): ICD-10-CM

## 2025-02-07 DIAGNOSIS — F39 MOOD DISORDER (HCC): ICD-10-CM

## 2025-02-07 DIAGNOSIS — E78.2 MIXED HYPERLIPIDEMIA: ICD-10-CM

## 2025-02-07 DIAGNOSIS — I25.83 CORONARY ARTERY DISEASE DUE TO LIPID RICH PLAQUE: ICD-10-CM

## 2025-02-07 DIAGNOSIS — I25.10 CORONARY ARTERY DISEASE DUE TO LIPID RICH PLAQUE: ICD-10-CM

## 2025-02-07 DIAGNOSIS — Z95.1 S/P CABG X 3: ICD-10-CM

## 2025-02-12 ENCOUNTER — PHARMACY VISIT (OUTPATIENT)
Dept: PHARMACY | Facility: MEDICAL CENTER | Age: 70
End: 2025-02-12
Payer: COMMERCIAL

## 2025-02-12 PROCEDURE — RXMED WILLOW AMBULATORY MEDICATION CHARGE: Performed by: INTERNAL MEDICINE

## 2025-02-19 PROCEDURE — RXMED WILLOW AMBULATORY MEDICATION CHARGE

## 2025-02-19 PROCEDURE — RXMED WILLOW AMBULATORY MEDICATION CHARGE: Performed by: INTERNAL MEDICINE

## 2025-02-20 ENCOUNTER — TELEPHONE (OUTPATIENT)
Dept: CARDIOLOGY | Facility: MEDICAL CENTER | Age: 70
End: 2025-02-20

## 2025-02-20 ENCOUNTER — APPOINTMENT (OUTPATIENT)
Dept: MEDICAL GROUP | Facility: MEDICAL CENTER | Age: 70
End: 2025-02-20
Payer: MEDICARE

## 2025-02-20 ENCOUNTER — PHARMACY VISIT (OUTPATIENT)
Dept: PHARMACY | Facility: MEDICAL CENTER | Age: 70
End: 2025-02-20
Payer: COMMERCIAL

## 2025-02-20 VITALS
HEIGHT: 64 IN | BODY MASS INDEX: 25.74 KG/M2 | OXYGEN SATURATION: 98 % | DIASTOLIC BLOOD PRESSURE: 60 MMHG | SYSTOLIC BLOOD PRESSURE: 110 MMHG | TEMPERATURE: 97 F | WEIGHT: 150.8 LBS | HEART RATE: 68 BPM

## 2025-02-20 DIAGNOSIS — I70.0 ARTERIOSCLEROSIS OF AORTA (HCC): ICD-10-CM

## 2025-02-20 DIAGNOSIS — R19.7 DIARRHEA, UNSPECIFIED TYPE: ICD-10-CM

## 2025-02-20 DIAGNOSIS — I25.10 CORONARY ARTERY DISEASE INVOLVING NATIVE CORONARY ARTERY OF NATIVE HEART WITHOUT ANGINA PECTORIS: ICD-10-CM

## 2025-02-20 DIAGNOSIS — E66.3 OVERWEIGHT (BMI 25.0-29.9): ICD-10-CM

## 2025-02-20 DIAGNOSIS — Z95.1 S/P CABG X 3: ICD-10-CM

## 2025-02-20 DIAGNOSIS — F10.21 ALCOHOL DEPENDENCE IN REMISSION (HCC): ICD-10-CM

## 2025-02-20 DIAGNOSIS — M62.830 BACK SPASM: ICD-10-CM

## 2025-02-20 DIAGNOSIS — I25.10 CORONARY ARTERY DISEASE DUE TO LIPID RICH PLAQUE: ICD-10-CM

## 2025-02-20 DIAGNOSIS — I25.83 CORONARY ARTERY DISEASE DUE TO LIPID RICH PLAQUE: ICD-10-CM

## 2025-02-20 DIAGNOSIS — I10 PRIMARY HYPERTENSION: ICD-10-CM

## 2025-02-20 DIAGNOSIS — I50.32 CHRONIC HEART FAILURE WITH PRESERVED EJECTION FRACTION (HFPEF, >= 50%) (HCC): ICD-10-CM
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
## 2025-02-20 PROCEDURE — 99214 OFFICE O/P EST MOD 30 MIN: CPT

## 2025-02-20 PROCEDURE — RXMED WILLOW AMBULATORY MEDICATION CHARGE

## 2025-02-20 RX ORDER — OLMESARTAN MEDOXOMIL 40 MG/1
20 TABLET ORAL DAILY
Qty: 100 TABLET | Refills: 1 | Status: SHIPPED | OUTPATIENT
Start: 2025-02-20 | End: 2025-02-20

## 2025-02-20 RX ORDER — CYCLOBENZAPRINE HCL 5 MG
5-10 TABLET ORAL 3 TIMES DAILY PRN
Qty: 90 TABLET | Refills: 0 | Status: SHIPPED | OUTPATIENT
Start: 2025-02-20 | End: 2025-03-08

## 2025-02-20 RX ORDER — SEMAGLUTIDE 0.68 MG/ML
0.5 INJECTION, SOLUTION SUBCUTANEOUS
Qty: 3 ML | Refills: 11 | Status: SHIPPED | OUTPATIENT
Start: 2025-02-20

## 2025-02-20 ASSESSMENT — ENCOUNTER SYMPTOMS
ORTHOPNEA: 0
COUGH: 0
SHORTNESS OF BREATH: 0
PALPITATIONS: 0
CHILLS: 0
FEVER: 0
BACK PAIN: 1

## 2025-02-20 ASSESSMENT — PATIENT HEALTH QUESTIONNAIRE - PHQ9: CLINICAL INTERPRETATION OF PHQ2 SCORE: 0

## 2025-02-20 ASSESSMENT — FIBROSIS 4 INDEX: FIB4 SCORE: 1.48
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
## 2025-02-21 ENCOUNTER — PHARMACY VISIT (OUTPATIENT)
Dept: PHARMACY | Facility: MEDICAL CENTER | Age: 70
End: 2025-02-21
Payer: COMMERCIAL

## 2025-02-27 ENCOUNTER — PHARMACY VISIT (OUTPATIENT)
Dept: PHARMACY | Facility: MEDICAL CENTER | Age: 70
End: 2025-02-27

## 2025-03-05 ENCOUNTER — PHARMACY VISIT (OUTPATIENT)
Dept: PHARMACY | Facility: MEDICAL CENTER | Age: 70
End: 2025-03-05
Payer: COMMERCIAL

## 2025-03-05 ENCOUNTER — HOSPITAL ENCOUNTER (OUTPATIENT)
Dept: LAB | Facility: MEDICAL CENTER | Age: 70
End: 2025-03-05
Attending: INTERNAL MEDICINE
Payer: MEDICARE

## 2025-03-05 ENCOUNTER — HOSPITAL ENCOUNTER (OUTPATIENT)
Dept: LAB | Facility: MEDICAL CENTER | Age: 70
End: 2025-03-05
Payer: MEDICARE

## 2025-03-05 DIAGNOSIS — E55.9 VITAMIN D DEFICIENCY: ICD-10-CM

## 2025-03-05 DIAGNOSIS — Z13.0 SCREENING FOR ENDOCRINE, METABOLIC AND IMMUNITY DISORDER: ICD-10-CM

## 2025-03-05 DIAGNOSIS — I50.32 CHRONIC HEART FAILURE WITH PRESERVED EJECTION FRACTION (HFPEF, >= 50%) (HCC): ICD-10-CM

## 2025-03-05 DIAGNOSIS — I25.10 CORONARY ARTERY DISEASE INVOLVING NATIVE CORONARY ARTERY OF NATIVE HEART WITHOUT ANGINA PECTORIS: ICD-10-CM

## 2025-03-05 DIAGNOSIS — Z13.29 SCREENING FOR ENDOCRINE, METABOLIC AND IMMUNITY DISORDER: ICD-10-CM

## 2025-03-05 DIAGNOSIS — R73.01 IFG (IMPAIRED FASTING GLUCOSE): ICD-10-CM

## 2025-03-05 DIAGNOSIS — Z11.59 NEED FOR HEPATITIS C SCREENING TEST: ICD-10-CM

## 2025-03-05 DIAGNOSIS — Z95.1 S/P CABG X 3: ICD-10-CM

## 2025-03-05 DIAGNOSIS — Z13.228 SCREENING FOR ENDOCRINE, METABOLIC AND IMMUNITY DISORDER: ICD-10-CM

## 2025-03-05 LAB
25(OH)D3 SERPL-MCNC: 24 NG/ML (ref 30–100)
ALBUMIN SERPL BCP-MCNC: 4.5 G/DL (ref 3.2–4.9)
ALBUMIN/GLOB SERPL: 1.9 G/DL
ALP SERPL-CCNC: 99 U/L (ref 30–99)
ALT SERPL-CCNC: 13 U/L (ref 2–50)
ANION GAP SERPL CALC-SCNC: 12 MMOL/L (ref 7–16)
ANION GAP SERPL CALC-SCNC: 13 MMOL/L (ref 7–16)
AST SERPL-CCNC: 23 U/L (ref 12–45)
BILIRUB SERPL-MCNC: 0.5 MG/DL (ref 0.1–1.5)
BUN SERPL-MCNC: 29 MG/DL (ref 8–22)
BUN SERPL-MCNC: 29 MG/DL (ref 8–22)
CALCIUM ALBUM COR SERPL-MCNC: 9.3 MG/DL (ref 8.5–10.5)
CALCIUM SERPL-MCNC: 9.6 MG/DL (ref 8.5–10.5)
CALCIUM SERPL-MCNC: 9.7 MG/DL (ref 8.5–10.5)
CHLORIDE SERPL-SCNC: 96 MMOL/L (ref 96–112)
CHLORIDE SERPL-SCNC: 97 MMOL/L (ref 96–112)
CHOLEST SERPL-MCNC: 144 MG/DL (ref 100–199)
CO2 SERPL-SCNC: 22 MMOL/L (ref 20–33)
CO2 SERPL-SCNC: 23 MMOL/L (ref 20–33)
CREAT SERPL-MCNC: 0.88 MG/DL (ref 0.5–1.4)
CREAT SERPL-MCNC: 0.9 MG/DL (ref 0.5–1.4)
ERYTHROCYTE [DISTWIDTH] IN BLOOD BY AUTOMATED COUNT: 42.2 FL (ref 35.9–50)
EST. AVERAGE GLUCOSE BLD GHB EST-MCNC: 94 MG/DL
GFR SERPLBLD CREATININE-BSD FMLA CKD-EPI: 69 ML/MIN/1.73 M 2
GFR SERPLBLD CREATININE-BSD FMLA CKD-EPI: 71 ML/MIN/1.73 M 2
GLOBULIN SER CALC-MCNC: 2.4 G/DL (ref 1.9–3.5)
GLUCOSE SERPL-MCNC: 72 MG/DL (ref 65–99)
GLUCOSE SERPL-MCNC: 75 MG/DL (ref 65–99)
HBA1C MFR BLD: 4.9 % (ref 4–5.6)
HCT VFR BLD AUTO: 39.4 % (ref 37–47)
HCV AB SER QL: NORMAL
HDLC SERPL-MCNC: 47 MG/DL
HGB BLD-MCNC: 13.2 G/DL (ref 12–16)
LDLC SERPL CALC-MCNC: 77 MG/DL
MCH RBC QN AUTO: 32.5 PG (ref 27–33)
MCHC RBC AUTO-ENTMCNC: 33.5 G/DL (ref 32.2–35.5)
MCV RBC AUTO: 97 FL (ref 81.4–97.8)
PLATELET # BLD AUTO: 265 K/UL (ref 164–446)
PMV BLD AUTO: 9.2 FL (ref 9–12.9)
POTASSIUM SERPL-SCNC: 4 MMOL/L (ref 3.6–5.5)
POTASSIUM SERPL-SCNC: 4.1 MMOL/L (ref 3.6–5.5)
PROT SERPL-MCNC: 6.9 G/DL (ref 6–8.2)
RBC # BLD AUTO: 4.06 M/UL (ref 4.2–5.4)
SODIUM SERPL-SCNC: 131 MMOL/L (ref 135–145)
SODIUM SERPL-SCNC: 132 MMOL/L (ref 135–145)
TRIGL SERPL-MCNC: 98 MG/DL (ref 0–149)
TSH SERPL DL<=0.005 MIU/L-ACNC: 2.52 UIU/ML (ref 0.38–5.33)
WBC # BLD AUTO: 7.3 K/UL (ref 4.8–10.8)

## 2025-03-05 PROCEDURE — 82172 ASSAY OF APOLIPOPROTEIN: CPT

## 2025-03-05 PROCEDURE — 83036 HEMOGLOBIN GLYCOSYLATED A1C: CPT

## 2025-03-05 PROCEDURE — 82306 VITAMIN D 25 HYDROXY: CPT

## 2025-03-05 PROCEDURE — 80061 LIPID PANEL: CPT

## 2025-03-05 PROCEDURE — 84443 ASSAY THYROID STIM HORMONE: CPT

## 2025-03-05 PROCEDURE — 36415 COLL VENOUS BLD VENIPUNCTURE: CPT

## 2025-03-05 PROCEDURE — 80048 BASIC METABOLIC PNL TOTAL CA: CPT

## 2025-03-05 PROCEDURE — RXMED WILLOW AMBULATORY MEDICATION CHARGE

## 2025-03-05 PROCEDURE — 80053 COMPREHEN METABOLIC PANEL: CPT

## 2025-03-05 PROCEDURE — 85027 COMPLETE CBC AUTOMATED: CPT

## 2025-03-05 PROCEDURE — 86803 HEPATITIS C AB TEST: CPT

## 2025-03-06 ENCOUNTER — RESULTS FOLLOW-UP (OUTPATIENT)
Dept: CARDIOLOGY | Facility: MEDICAL CENTER | Age: 70
End: 2025-03-06
Payer: MEDICARE

## 2025-03-07 ENCOUNTER — OFFICE VISIT (OUTPATIENT)
Dept: CARDIOLOGY | Facility: MEDICAL CENTER | Age: 70
End: 2025-03-07
Attending: INTERNAL MEDICINE
Payer: MEDICARE

## 2025-03-07 ENCOUNTER — RESULTS FOLLOW-UP (OUTPATIENT)
Dept: MEDICAL GROUP | Facility: MEDICAL CENTER | Age: 70
End: 2025-03-07
Payer: MEDICARE

## 2025-03-07 VITALS
HEART RATE: 72 BPM | BODY MASS INDEX: 26.46 KG/M2 | OXYGEN SATURATION: 98 % | DIASTOLIC BLOOD PRESSURE: 70 MMHG | HEIGHT: 64 IN | RESPIRATION RATE: 16 BRPM | WEIGHT: 155 LBS | SYSTOLIC BLOOD PRESSURE: 124 MMHG

## 2025-03-07 DIAGNOSIS — Z95.1 S/P CABG X 3: ICD-10-CM

## 2025-03-07 DIAGNOSIS — I10 PRIMARY HYPERTENSION: ICD-10-CM

## 2025-03-07 DIAGNOSIS — E78.2 MIXED HYPERLIPIDEMIA: ICD-10-CM

## 2025-03-07 DIAGNOSIS — I25.9 ISCHEMIC HEART DISEASE DUE TO CORONARY ARTERY OBSTRUCTION (HCC): ICD-10-CM

## 2025-03-07 DIAGNOSIS — I25.10 CORONARY ARTERY DISEASE INVOLVING NATIVE CORONARY ARTERY OF NATIVE HEART WITHOUT ANGINA PECTORIS: ICD-10-CM

## 2025-03-07 DIAGNOSIS — I50.32 CHRONIC HEART FAILURE WITH PRESERVED EJECTION FRACTION (HFPEF, >= 50%) (HCC): ICD-10-CM

## 2025-03-07 DIAGNOSIS — I24.0 ISCHEMIC HEART DISEASE DUE TO CORONARY ARTERY OBSTRUCTION (HCC): ICD-10-CM

## 2025-03-07 LAB — APO B100 SERPL-MCNC: 68 MG/DL (ref 60–117)

## 2025-03-07 PROCEDURE — 3074F SYST BP LT 130 MM HG: CPT | Performed by: INTERNAL MEDICINE

## 2025-03-07 PROCEDURE — 99214 OFFICE O/P EST MOD 30 MIN: CPT | Performed by: INTERNAL MEDICINE

## 2025-03-07 PROCEDURE — 3078F DIAST BP <80 MM HG: CPT | Performed by: INTERNAL MEDICINE

## 2025-03-07 PROCEDURE — 99213 OFFICE O/P EST LOW 20 MIN: CPT | Performed by: INTERNAL MEDICINE

## 2025-03-07 PROCEDURE — G2211 COMPLEX E/M VISIT ADD ON: HCPCS | Performed by: INTERNAL MEDICINE

## 2025-03-07 RX ORDER — EMPAGLIFLOZIN 10 MG/1
10 TABLET, FILM COATED ORAL DAILY
Qty: 100 TABLET | Refills: 3 | Status: SHIPPED | OUTPATIENT
Start: 2025-03-07

## 2025-03-07 RX ORDER — METOPROLOL SUCCINATE 25 MG/1
25 TABLET, EXTENDED RELEASE ORAL DAILY
Qty: 100 TABLET | Refills: 3 | Status: SHIPPED | OUTPATIENT
Start: 2025-03-07

## 2025-03-07 ASSESSMENT — FIBROSIS 4 INDEX: FIB4 SCORE: 1.64

## 2025-03-07 NOTE — PROGRESS NOTES
Cardiology Follow Up Consultation Note    Date of note:    3/7/2025  Primary Care Provider: No primary care provider on file.  Referring Provider: No ref. provider found    Patient Name: Rony Chilel   YOB: 1955  MRN:              [id number]    Chief Complaint   Patient presents with    Coronary Artery Disease     F/V Dx: Ischemic heart disease due to coronary artery obstruction (HCC)    Hyperlipidemia    Congestive Heart Failure     F/V Dx: Chronic heart failure with preserved ejection fraction (HFpEF, >= 50%) (HCC)       History of Present Illness:  Ms. Rony Chilel is a 69-year-old woman with past medical history significant for coronary artery disease status post PCI to RCA, severe multivessel CAD status post CABG x 3, hypertension, hyperlipidemia, HFpEF who presents to the cardiology office to for follow-up.    Her cardiac history dates back to 5/11/2022 when she was admitted to [facility] with chest pain.  Found to have inferior STEMI and taken to the Cath Lab.  Noted to have under percent occlusion of proximal RCA and 80% distal occlusion of RCA that was treated with ALCON.  Had residual obstructive disease involving LAD and LCx.  Underwent CABG x 3 (LIMA to LAD, SVG to D1, SVG to OM1).    She was admitted to the hospital back in October 2024 with symptoms of jaw pain in the setting of hypertensive emergency and mild volume overload.  Was started on antihypertensive medications for blood pressure control.  Also received diuretics given elevated NT proBNP.  Had ischemic testing with MPI which was negative for ischemia.  Echocardiogram revealed preserved LV function however, she was found to have grade 3 diastolic dysfunction.     During 11/21/2024 office visit, she was started on SGLT2i therapy and spironolactone.    Interval history:  She presents today for follow-up.  She tells me that she feels spectacular.  Since initiating therapies with Jardiance, she has not noticed any  recurrence of lower extremity edema.  Has also noticed improvement in abdominal bloating/swelling.  She denies having chest pain or dyspnea.  Is also been taking spironolactone 25 mg daily.  She recently had BMP checked which showed improvement in sodium and stable renal function.      Cardiovascular Risk Factors:  1. Smoking status: Former smoker. Quit in 2022  2. Type II Diabetes Mellitus: Denies   Lab Results   Component Value Date/Time    HBA1C 4.9 03/05/2025 10:54 AM    HBA1C 5.2 10/06/2024 10:31 PM     3. Hypertension: Yes  4. Dyslipidemia: Yes   Cholesterol,Tot   Date Value Ref Range Status   01/09/2024 121 100 - 199 mg/dL Final     LDL   Date Value Ref Range Status   01/09/2024 43 <100 mg/dL Final     HDL   Date Value Ref Range Status   01/09/2024 47 >=40 mg/dL Final     Triglycerides   Date Value Ref Range Status   01/09/2024 155 (H) 0 - 149 mg/dL Final     5. Family history of early Coronary Artery Disease in a first degree relative (Male less than 55 years of age; Female less than 65 years of age): Denies      Review of Systems:  As per HPI.  Review of systems assessed and are negative except as stated above.        Past Medical History:   Diagnosis Date    Allergy     CAD (coronary artery disease)     Heart attack (HCC)     Hyperlipidemia     Hypertension     PONV (postoperative nausea and vomiting)     Psychiatric disorder     Depression, PTSD, anxiety         Past Surgical History:   Procedure Laterality Date    MULTIPLE CORONARY ARTERY BYPASS ENDO VEIN HARVEST  6/30/2022    Procedure: CABG, WITH ENDOSCOPIC VEIN PROCUREMENT-X3;  Surgeon: Irvin Lama D.O.;  Location: SURGERY [facility];  Service: Cardiothoracic    ECHOCARDIOGRAM, TRANSESOPHAGEAL, INTRAOPERATIVE  6/30/2022    Procedure: ECHOCARDIOGRAM, TRANSESOPHAGEAL, INTRAOPERATIVE;  Surgeon: Irvin Lama D.O.;  Location: SURGERY [facility];  Service: Cardiothoracic    CHOLECYSTECTOMY      Open    MAMMOPLASTY AUGMENTATION Bilateral           Current Outpatient Medications   Medication Sig Dispense Refill    Empagliflozin (JARDIANCE) 10 MG Tab tablet Take 1 Tablet by mouth every day. 100 Tablet 3    metoprolol SR (TOPROL XL) 25 MG TABLET SR 24 HR Take 1 Tablet by mouth every day. 100 Tablet 3    cyclobenzaprine (FLEXERIL) 5 mg tablet Take 1-2 Tablets by mouth 3 times a day as needed for Muscle Spasms for up to 15 days. 90 Tablet 0    Semaglutide,0.25 or 0.5MG/DOS, (OZEMPIC, 0.25 OR 0.5 MG/DOSE,) 2 MG/3ML Solution Pen-injector Inject 0.5 mg under the skin every 7 days. 3 mL 11    spironolactone (ALDACTONE) 25 MG Tab Take 1 Tablet by mouth every day. 100 Tablet 3    furosemide (LASIX) 20 MG Tab Take 1 Tablet by mouth 1 time a day as needed (As needed for weight gain or edema). 100 Tablet 0    Melatonin 5 MG Cap Take 5-10 mg by mouth at bedtime as needed.      citalopram (CELEXA) 40 MG Tab Take 1 Tablet by mouth every day. 100 Tablet 3    alendronate (FOSAMAX) 70 MG Tab Take 1 Tablet by mouth every 7 days. 12 Tablet 3    aspirin 81 MG EC tablet Take 1 tablet by mouth every day. 100 Tablet 3    rosuvastatin (CRESTOR) 20 MG Tab Take 1 tablet by mouth every evening. 100 Tablet 1    nitroglycerin (NITROSTAT) 0.4 MG SL Tab Place 1 Tablet under the tongue one time as needed for Chest Pain for up to 1 dose. 12 Tablet 3    diphenhydrAMINE (BENADRYL) 25 MG Tab Take 25 mg by mouth at bedtime.      acetaminophen (TYLENOL) 500 MG Tab Take 500 mg by mouth every 6 hours as needed for Mild Pain.       No current facility-administered medications for this visit.         Allergies   Allergen Reactions    Oxycodone Itching    Ticagrelor Rash and Itching          Morphine Vomiting and Nausea    Hydromorphone Itching         Family History   Problem Relation Age of Onset    Arterial Aneurysm Mother     Alcohol abuse Mother     Diabetes Father     Heart Disease Father     Psychiatric Illness Paternal Grandmother         Paronoid schizophrenia    Cancer Sister          Biliary cancer, carolis disease    Hyperlipidemia Brother         Htn, mi, Cabg         Social History     Socioeconomic History    Marital status:      Spouse name: Not on file    Number of children: Not on file    Years of education: Not on file    Highest education level: Bachelor's degree (e.g., BA, AB, BS)   Occupational History    Not on file   Tobacco Use    Smoking status: Every Day     Current packs/day: 0.00     Average packs/day: 0.5 packs/day for 15.0 years (7.5 ttl pk-yrs)     Types: Cigarettes     Start date: 2007     Last attempt to quit: 2022     Years since quittin.7    Smokeless tobacco: Never   Vaping Use    Vaping status: Never Used   Substance and Sexual Activity    Alcohol use: Not Currently     Comment: social    Drug use: Yes     Frequency: 1.0 times per week     Types: Marijuana     Comment: edibles, marijuana    Sexual activity: Not Currently     Birth control/protection: Abstinence   Other Topics Concern    Not on file   Social History Narrative    Not on file     Social Drivers of Health     Financial Resource Strain: Low Risk  (2024)    Overall Financial Resource Strain (CARDIA)     Difficulty of Paying Living Expenses: Not hard at all   Food Insecurity: No Food Insecurity (2024)    Hunger Vital Sign     Worried About Running Out of Food in the Last Year: Never true     Ran Out of Food in the Last Year: Never true   Transportation Needs: No Transportation Needs (2024)    PRAPARE - Transportation     Lack of Transportation (Medical): No     Lack of Transportation (Non-Medical): No   Recent Concern: Transportation Needs - Unmet Transportation Needs (2024)    PRAPARE - Transportation     Lack of Transportation (Medical): No     Lack of Transportation (Non-Medical): Yes   Physical Activity: Insufficiently Active (2024)    Exercise Vital Sign     Days of Exercise per Week: 1 day     Minutes of Exercise per Session: 20 min   Stress: Stress Concern  "Present (9/30/2024)    [employer] - Occupational Stress Questionnaire     Feeling of Stress : To some extent   Social Connections: Moderately Integrated (11/4/2024)    Social Connection and Isolation Panel [NHANES]     Frequency of Communication with Friends and Family: More than three times a week     Frequency of Social Gatherings with Friends and Family: Twice a week     Attends Evangelical Services: More than 4 times per year     Active Member of Clubs or Organizations: Yes     Attends Club or Organization Meetings: More than 4 times per year     Marital Status:    Intimate Partner Violence: Not At Risk (11/4/2024)    Humiliation, Afraid, Rape, and Kick questionnaire     Fear of Current or Ex-Partner: No     Emotionally Abused: No     Physically Abused: No     Sexually Abused: No   Housing Stability: Low Risk  (11/4/2024)    Housing Stability Vital Sign     Unable to Pay for Housing in the Last Year: No     Number of Times Moved in the Last Year: 0     Homeless in the Last Year: No         Physical Exam:  Ambulatory Vitals  /70 (BP Location: Left arm, Patient Position: Sitting, BP Cuff Size: Adult)   Pulse 72   Resp 16   Ht 1.626 m (5' 4\")   Wt 70.3 kg (155 lb)   SpO2 98%    Oxygen Therapy:  Pulse Oximetry: 98 %  BP Readings from Last 4 Encounters:   03/07/25 124/70   02/20/25 110/60   11/21/24 100/58   10/11/24 110/64       Weight/BMI: Body mass index is 26.61 kg/m².  Wt Readings from Last 4 Encounters:   03/07/25 70.3 kg (155 lb)   02/20/25 68.4 kg (150 lb 12.8 oz)   11/21/24 74.4 kg (164 lb)   10/11/24 74.1 kg (163 lb 6.4 oz)         General: Not in acute distress  HEENT: OP clear   Neck:  No JVD appreciated  CVS:  RRR, Normal S1, S2. No murmurs, rubs or gallops  Resp: Normal respiratory effort, lungs CTA bilaterally  Abdomen: Soft, non-distended  Skin: No obvious rashes, no cyanosis  Neurological: Alert and oriented x3, moves all extremities, no focal neurologic " deficits  Psychiatric: Appropriate affect  Extremities:   Extremities warm, No edema in BL lower ext      Lab Data Review:  Lab Results   Component Value Date/Time    CHOLSTRLTOT 144 03/05/2025 10:54 AM    LDL 77 03/05/2025 10:54 AM    HDL 47 03/05/2025 10:54 AM    TRIGLYCERIDE 98 03/05/2025 10:54 AM       Lab Results   Component Value Date/Time    SODIUM 131 (L) 03/05/2025 10:54 AM    POTASSIUM 4.0 03/05/2025 10:54 AM    CHLORIDE 96 03/05/2025 10:54 AM    CO2 22 03/05/2025 10:54 AM    GLUCOSE 72 03/05/2025 10:54 AM    BUN 29 (H) 03/05/2025 10:54 AM    CREATININE 0.90 03/05/2025 10:54 AM    BUNCREATRAT 20.0 05/13/2022 04:30 AM     Lab Results   Component Value Date/Time    ALKPHOSPHAT 99 03/05/2025 10:54 AM    ASTSGOT 23 03/05/2025 10:54 AM    ALTSGPT 13 03/05/2025 10:54 AM    TBILIRUBIN 0.5 03/05/2025 10:54 AM      Lab Results   Component Value Date/Time    WBC 7.3 03/05/2025 10:54 AM    HEMOGLOBIN 13.2 03/05/2025 10:54 AM     Lab Results   Component Value Date/Time    HBA1C 4.9 03/05/2025 10:54 AM    HBA1C 5.2 10/06/2024 10:31 PM         Cardiac Imaging and Procedures Review:    EKG dated 1/30/2024:   My personal interpretation is sinus, nonspecific ST-T changes     TTE 5/13/2022:  1. The left ventricle is normal size and function. LVEF estimated at 55% by Jett's biplane method. Normal wall thickness. No segmental wall motion abnormalities seen. Grade 1 (impaired relaxation) diastolic dysfunction. No VSD or apical thrombus seen.   2. The right ventricle is normal in size and function. RVSP estimated at 27 mmHg with RAP of 3 mmHg.      Intraoperative SACHA 6/30/2022:  LVEF estimated at 70%. Mild MR. Biatrial enlargement.     Echo 10/7/2024:  Normal left ventricular systolic function.  The ejection fraction is measured to be  63% by Jett's biplane.  Grade III diastolic dysfunction (restrictive pattern).  Reduced right ventricular systolic function by TAPSE (1.4 cm).  Right ventricular systolic pressure is  estimated to be 51 mmHg.  Moderately dilated left atrium.  Mild mitral regurgitation.  Mild tricuspid regurgitation.  Mild pulmonic insufficiency.  No pericardial effusion.  Normal inferior vena cava size and inspiratory collapse.    Stress test 10/7/2024:   NUCLEAR IMAGING INTERPRETATION   No evidence of significant jeopardized viable myocardium or prior myocardial    infarction.   Normal left ventricular size, ejection fraction, and wall motion.   ECG INTERPRETATION   Nondiagnostic due to use of pharmacological stress agent.      Assessment & Plan     1. Ischemic heart disease due to coronary artery obstruction (HCC)        2. Coronary artery disease involving native coronary artery of native heart without angina pectoris  metoprolol SR (TOPROL XL) 25 MG TABLET SR 24 HR      3. S/P CABG x 3        4. Mixed hyperlipidemia        5. Chronic heart failure with preserved ejection fraction (HFpEF, >= 50%) (HCC)  Empagliflozin (JARDIANCE) 10 MG Tab tablet      6. Primary hypertension  metoprolol SR (TOPROL XL) 25 MG TABLET SR 24 HR            Medical Decision Making:  Ms. Rony Chilel is a 69-year-old woman with past medical history significant for coronary artery disease status post PCI to RCA, severe multivessel CAD status post CABG x 3, hypertension, hyperlipidemia, HFpEF who presents to the cardiology office to for follow-up.      1. Ischemic heart disease due to coronary artery obstruction (HCC)  2. Coronary artery disease involving native coronary artery of native heart without angina pectoris  3. S/P CABG x 3  -Stable from cardiovascular standpoint.  No recurrent angina.  Most recent ischemic testing in October 2024 shows no evidence of significant jeopardized viable myocardium or prior myocardial infarction.  -Continue aspirin 81 mg daily.  Rosuvastatin 20 mg daily.  Check lipid panel in 1 year.    4. Mixed hyperlipidemia  -Chronic, stable condition.  Continue rosuvastatin 20 mg daily.    5. Chronic heart failure  with preserved ejection fraction (HFpEF, >= 50%) (HCC)  -Chronic HFpEF.  Euvolemic and well compensated.  Continue maintenance diuretics Lasix as needed 20 mg daily.  Continue Jardiance 10 mg daily and spironolactone 25 mg daily.    6. Primary hypertension  -Chronic, stable condition.  Blood pressure is at goal at less than 130/80 mmHg.  No changes to BP medications.  Continue metoprolol, spironolactone.    () Today's E/M visit is associated with medical care services that serve as the continuing focal point for all needed health care services and/or with medical care services that  are part of ongoing care related to a patient's single, serious condition, or a complex condition: This includes  furnishing services to patients on an ongoing basis that result in care that is personalized  to the patient. The services result in a comprehensive, longitudinal, and continuous  relationship with the patient and involve delivery of team-based care that is accessible, coordinated with other practitioners and providers, and integrated with the broader health care landscape.       It was a pleasure seeing Ms. Rony Chilel in the office today. Return in about 6 months (around 9/7/2025) for Coronary artery disease, Heart failure. Patient is aware to call the cardiology clinic with any questions or concerns.      Israel Mckeon MD, [facility]  Cardiologist, [facility], [facility]  [address] [address]  Phone: [phone]  Fax: [phone]    Please note that this dictation was created using voice recognition software. I have made every reasonable attempt to correct obvious errors, but it is possible there are errors of grammar and possibly content that I did not discover before finalizing the note.

## 2025-03-12 ENCOUNTER — PATIENT OUTREACH (OUTPATIENT)
Dept: HEALTH INFORMATION MANAGEMENT | Facility: OTHER | Age: 70
End: 2025-03-12
Payer: MEDICARE

## 2025-03-12 DIAGNOSIS — Z95.1 S/P CABG X 3: ICD-10-CM

## 2025-03-12 DIAGNOSIS — I25.83 CORONARY ARTERY DISEASE DUE TO LIPID RICH PLAQUE: ICD-10-CM

## 2025-03-12 DIAGNOSIS — I25.10 CORONARY ARTERY DISEASE DUE TO LIPID RICH PLAQUE: ICD-10-CM

## 2025-03-12 DIAGNOSIS — I50.32 CHRONIC HEART FAILURE WITH PRESERVED EJECTION FRACTION (HFPEF, >= 50%) (HCC): ICD-10-CM

## 2025-03-12 DIAGNOSIS — E78.2 MIXED HYPERLIPIDEMIA: ICD-10-CM

## 2025-03-12 DIAGNOSIS — F39 MOOD DISORDER (HCC): ICD-10-CM

## 2025-03-12 DIAGNOSIS — I10 PRIMARY HYPERTENSION: ICD-10-CM

## 2025-03-12 DIAGNOSIS — I70.0 ARTERIOSCLEROSIS OF AORTA (HCC): ICD-10-CM

## 2025-03-20 NOTE — PROGRESS NOTES
Reason for Follow-Up:  Monthly outreach follow up    Current Health Status:  CHF, HTN    Medical Updates:    No recent ER visits or hospitalization    Medication Updates:    No recent medication changes     Symptoms:    Denies any lower extremity swelling or shortness breath     Plan of Care Goals and Progress:    Goal 1. Hypertension education      Progress:  monitors blood pressure daily, she reports her blood pressure is in the 110s over 60s and her heart rate is in the 60s      Barriers:  Age, progression of disease process, habits      Interventions:  Reviewed next Cardiology follow up     Education: Reviewed medication regimen and encouraged an exercise program    Goal 2. CHF education      Progress:  Rony continues to monitor her weight daily, adheres to 1.8L fluid restriction, she reports her lungs are clear and her heart rate is in the 60s      Barriers:  Age, progression of disease process, habits      Interventions:  Reviewed next Cardiology follow up     Education:  Mailed out [employer] CHF informational packet      Patient's Concerns and Feedback (Self Management of Care):   Spoke with Rony for monthly outreach follow up. She reports this is the best she has felt since before her heart surgery.  Encouraged her to continue monitoring fluid balance, blood pressure, and weight daily.  She reports she continues on Ozempic and reports she is lost 5 pounds.  She states she currently weighs 150 pounds and is looking towards a goal of 140 pounds.  She has not increased her Ozempic dose as of yet she is taking a gentle approach to avoid any side effects.  She denies any other needs at this time.  Encouraged to call with any questions or concerns. Chart reviewed for Quarterly Assessment, patient continues to qualify and would benefit from PCM program.      Next Steps:     Follow-Up Plan:  April 2025      Appointments:  6/27 PCP      Contact Information:  Call [phone] with any questions or concerns.

## 2025-03-20 NOTE — CARE PLAN
Problem: Knowledge deficit of congestive heart failure disease process  Goal: HP Increase understanding of congestive heart failure/long term 11/7/24-5/31/25  Outcome: Progressing  Intervention: Provide patient with CHF education packet  Intervention: Educate patient on CHF basics  Intervention: Discuss daily check-up and heart health levels  Intervention: Educate on daily weight and reporting weight gain of 3 lbs in a day or 5 lbs in a week  Intervention: Provide education on controlling stress with cardiovascular disease  Intervention: Discuss normal heart function. Include information regarding patient's variance from normal function. Explain difference between heart attack and HF

## 2025-04-14 DIAGNOSIS — I24.0 ISCHEMIC HEART DISEASE DUE TO CORONARY ARTERY OBSTRUCTION (HCC): ICD-10-CM

## 2025-04-14 DIAGNOSIS — I10 PRIMARY HYPERTENSION: ICD-10-CM

## 2025-04-14 DIAGNOSIS — I50.32 CHRONIC HEART FAILURE WITH PRESERVED EJECTION FRACTION (HFPEF, >= 50%) (HCC): ICD-10-CM

## 2025-04-14 DIAGNOSIS — I25.9 ISCHEMIC HEART DISEASE DUE TO CORONARY ARTERY OBSTRUCTION (HCC): ICD-10-CM

## 2025-04-14 RX ORDER — FUROSEMIDE 20 MG/1
20 TABLET ORAL
Qty: 100 TABLET | Refills: 0 | Status: SHIPPED | OUTPATIENT
Start: 2025-04-14

## 2025-04-24 DIAGNOSIS — I25.10 CORONARY ARTERY DISEASE INVOLVING NATIVE CORONARY ARTERY OF NATIVE HEART WITHOUT ANGINA PECTORIS: ICD-10-CM

## 2025-04-24 DIAGNOSIS — Z95.1 S/P CABG X 3: ICD-10-CM

## 2025-04-24 RX ORDER — ROSUVASTATIN CALCIUM 20 MG/1
20 TABLET, COATED ORAL EVERY EVENING
Qty: 100 TABLET | Refills: 1 | Status: SHIPPED | OUTPATIENT
Start: 2025-04-24

## 2025-05-01 ENCOUNTER — PATIENT OUTREACH (OUTPATIENT)
Dept: HEALTH INFORMATION MANAGEMENT | Facility: OTHER | Age: 70
End: 2025-05-01
Payer: MEDICARE

## 2025-05-01 DIAGNOSIS — Z95.1 S/P CABG X 3: ICD-10-CM

## 2025-05-01 DIAGNOSIS — I50.32 CHRONIC HEART FAILURE WITH PRESERVED EJECTION FRACTION (HFPEF, >= 50%) (HCC): ICD-10-CM

## 2025-05-01 DIAGNOSIS — I70.0 ARTERIOSCLEROSIS OF AORTA (HCC): ICD-10-CM

## 2025-05-01 DIAGNOSIS — I10 PRIMARY HYPERTENSION: ICD-10-CM

## 2025-05-01 DIAGNOSIS — F39 MOOD DISORDER (HCC): ICD-10-CM

## 2025-05-01 DIAGNOSIS — I25.10 CORONARY ARTERY DISEASE DUE TO LIPID RICH PLAQUE: ICD-10-CM

## 2025-05-01 DIAGNOSIS — E78.2 MIXED HYPERLIPIDEMIA: ICD-10-CM

## 2025-05-01 DIAGNOSIS — I25.83 CORONARY ARTERY DISEASE DUE TO LIPID RICH PLAQUE: ICD-10-CM

## 2025-05-01 NOTE — CARE PLAN
Problem: Med Adherence  Goal: Consistently take Medications as Prescribed/short term 11/7/24-2/28/25  Outcome: Met  Intervention: Refer patient to pharmacist  Note: Declined   Intervention: Discuss barriers to medication adherence with patient  Intervention: Educate patient on frequency and refill details of meds  Intervention: Assist patient in setting up daily notes/alarms for meds. Consider pill box use

## 2025-05-01 NOTE — CARE PLAN
Problem: Knowledge deficit of factors contributing to hypertension  Goal: Demonstrate factors that can contribute to high blood pressure/long term 11/7/24-5/31/25  Outcome: Progressing  Note: Patient has verbalized understanding of factors contributing to high blood pressure   Intervention: Educate patient on role of weight management  Intervention: Educate patient on the role of smoking     Problem: Patient barriers to managing high blood pressure  Goal: Identify barriers to managing blood pressure/long term 11/7/24-5/31/25  Outcome: Progressing  Note: Patient has vocalized commitment to lessening the barriers. Rony monitors her blood pressure daily, she denies any barriers to monitoring her blood pressure at home.   Intervention: Identify patient barriers to managing high blood pressure  Intervention: Identify with patient what the barriers are to self-management of blood pressure     Problem: Knowledge deficit of self-monitoring blood pressure  Goal: Demonstrate the elements of self-monitoring blood pressure/short term 11/7/24-2/28/25  Outcome: Progressing  Note:   -Patient has verbalized commitment to self-monitoring blood pressure at least once a week.    -Patient will log home blood pressures and bring the log to medical provider appointments.    Intervention: Educate on importance of self-monitoring blood pressure  Intervention: Educate on proper technique of self-monitoring blood pressure  Intervention: Educate on importance of keeping a home blood pressure log  Intervention: Educate on bringing log to medical provider appointments

## 2025-05-01 NOTE — PROGRESS NOTES
"Reason for Follow-Up:  Monthly outreach follow up    Current Health Status:  CHF, HTN    Medical Updates:    No recent ER visits or hospitalization    Medication Updates:    No recent medication changes     Symptoms:    Denies any lower extremity swelling or shortness breath, denies any complaints or concerns.     Plan of Care Goals and Progress:    Goal 1. Hypertension education      Progress:  Rony reports she monitors blood pressure daily, blood pressures are in the 120s over 60s and her heart rate is in the 60s      Barriers:  Age, progression of disease process, habits      Interventions:  Reviewed next Cardiology  and PCP follow up appointments     Education:  Reviewed current medication regimen and encouraged continued exercise     Goal 2. CHF education      Progress:  Rony continues to monitor her weight daily, adheres to 1.8L fluid restriction, she denies any lower extremity swelling and her heart rate is in the 60s      Barriers:  Age, progression of disease process, habits      Interventions:  Reviewed next Cardiology  and PCP follow up appointments     Education: Reviewed medication regimen      Patient's Concerns and Feedback (Self Management of Care):   Spoke with Rony for monthly outreach follow-up.  She states she is doing \"fine\".  Reviewed hypertension and CHF education as above.  Rony has started Ozempic in February reviewed her current dosage and diet.  She states she has not made any changes to her weight in 1 month.  She reports she is going to reach out to her PCP via [employer] and discuss if a dosage change is appropriate for Ozempic at this time.  She states she is going out walking \"some.\" Encouraged continued exercise.  Rony denies any needs at this time encouraged to call with any questions or concerns    Next Steps:     Follow-Up Plan:  May 2025      Appointments:  6/27 PCP, 9/16 Cardiology      Contact Information:  Call [phone] with any questions or " concerns.

## 2025-05-05 PROCEDURE — RXMED WILLOW AMBULATORY MEDICATION CHARGE: Performed by: INTERNAL MEDICINE

## 2025-05-08 ENCOUNTER — PHARMACY VISIT (OUTPATIENT)
Dept: PHARMACY | Facility: MEDICAL CENTER | Age: 70
End: 2025-05-08
Payer: COMMERCIAL

## 2025-05-09 PROCEDURE — RXMED WILLOW AMBULATORY MEDICATION CHARGE

## 2025-05-09 PROCEDURE — RXMED WILLOW AMBULATORY MEDICATION CHARGE: Performed by: INTERNAL MEDICINE

## 2025-05-14 ENCOUNTER — OFFICE VISIT (OUTPATIENT)
Dept: MEDICAL GROUP | Facility: MEDICAL CENTER | Age: 70
End: 2025-05-14
Payer: MEDICARE

## 2025-05-14 VITALS
OXYGEN SATURATION: 97 % | HEART RATE: 79 BPM | TEMPERATURE: 97.6 F | DIASTOLIC BLOOD PRESSURE: 66 MMHG | SYSTOLIC BLOOD PRESSURE: 102 MMHG | BODY MASS INDEX: 25.47 KG/M2 | WEIGHT: 149.2 LBS | RESPIRATION RATE: 18 BRPM | HEIGHT: 64 IN

## 2025-05-14 DIAGNOSIS — I50.32 CHRONIC HEART FAILURE WITH PRESERVED EJECTION FRACTION (HFPEF, >= 50%) (HCC): Primary | ICD-10-CM

## 2025-05-14 DIAGNOSIS — Z95.1 S/P CABG X 3: ICD-10-CM

## 2025-05-14 DIAGNOSIS — I24.9 ACS (ACUTE CORONARY SYNDROME) (HCC): ICD-10-CM

## 2025-05-14 DIAGNOSIS — G47.00 INSOMNIA, UNSPECIFIED TYPE: ICD-10-CM

## 2025-05-14 DIAGNOSIS — G89.29 CHRONIC MIDLINE THORACIC BACK PAIN: ICD-10-CM

## 2025-05-14 DIAGNOSIS — M54.6 CHRONIC MIDLINE THORACIC BACK PAIN: ICD-10-CM

## 2025-05-14 DIAGNOSIS — R11.2 NAUSEA AND VOMITING, UNSPECIFIED VOMITING TYPE: ICD-10-CM

## 2025-05-14 DIAGNOSIS — F10.21 ALCOHOL DEPENDENCE IN REMISSION (HCC): ICD-10-CM

## 2025-05-14 PROCEDURE — 3078F DIAST BP <80 MM HG: CPT

## 2025-05-14 PROCEDURE — 3074F SYST BP LT 130 MM HG: CPT

## 2025-05-14 PROCEDURE — 99214 OFFICE O/P EST MOD 30 MIN: CPT

## 2025-05-14 RX ORDER — SEMAGLUTIDE 1.34 MG/ML
1 INJECTION, SOLUTION SUBCUTANEOUS
Qty: 6 ML | Refills: 6 | Status: SHIPPED | OUTPATIENT
Start: 2025-05-14

## 2025-05-14 ASSESSMENT — ENCOUNTER SYMPTOMS
SHORTNESS OF BREATH: 0
PALPITATIONS: 0
COUGH: 0
ORTHOPNEA: 0
FEVER: 0
CHILLS: 0

## 2025-05-14 ASSESSMENT — FIBROSIS 4 INDEX: FIB4 SCORE: 1.66

## 2025-05-14 NOTE — PROGRESS NOTES
Subjective:     Verbal consent was acquired by the patient to use [employer] ambient listening note generation during this visit Yes    CC: No chief complaint on file.      HPI:   Rony is a 70 y.o. female who presents today for:    History of Present Illness  The patient presents for evaluation of gastroparesis, insomnia, and alcohol use disorder.    CHF/CAD  She reports some nausea, burping, and indigestion, which she attributes to the recent administration of semaglutide (Ozempic). Approximately 12 to 14 hours following her last injection, she experienced persistent emesis on Saturday, characterized by the expulsion of partially digested food without bile. She also reports increased flatulence and nausea, without any episodes of diarrhea. The patient has been self-isolating and has not engaged in recent social interactions. She has been on a stable dose of semaglutide 0.5 mg weekly for nearly 2 months and is contemplating a dosage increase. She has experienced a weight loss of 6 pounds since her last visit. There has been no weight gain or peripheral edema, and she believes her cardiac condition is well-managed. She is complaint with her medications and tracks her blood pressure and weight, which have been stable.      Insomnia  The patient has been experiencing insomnia for the past 1.5 to 2 weeks, with delayed sleep onset until 5:00 AM today. She has exhausted her supply of melatonin and has been alternating between diphenhydramine (Benadryl), cannabidiol (CBD), and tetrahydrocannabinol (THC) to manage her symptoms. She took diphenhydramine and CBD last night. She plans to order B vitamins and melatonin today. She has also tried magnesium and B vitamins to aid sleep.  - Onset: Insomnia began 1.5 to 2 weeks ago.  - Duration: Persistent for the past 1.5 to 2 weeks.  - Character: Delayed sleep onset until 5:00 AM today.  - Alleviating/Aggravating Factors: Alternating between diphenhydramine, CBD, THC, magnesium,  "and B vitamins; plans to order melatonin and B vitamins.    Alcohol Use Disorder  The patient has a history of alcohol use disorder, with previous consumption of up to 12 beers daily. She occasionally tests her addictive behavior by consuming a single beer, which typically results in nausea, headache, and hangover symptoms.  - Onset: History of alcohol use disorder with previous daily consumption of up to 12 beers.  - Character: Testing addictive behavior by consuming a single beer results in nausea, headache, and hangover symptoms.  - Severity: Significant reduction in alcohol consumption.    Thoracic back pain  She reports a significant reduction in spinal pain over the last month, particularly in the upper spine, which she attributes to the anti-inflammatory effects of semaglutide.  - Onset: Significant reduction in spinal pain over the last month.  - Location: Upper spine.  - Character: Reduction in spinal pain attributed to the anti-inflammatory effects of semaglutide.    SOCIAL HISTORY  The patient is a former nurse. She is a former smoker. She is a former alcoholic, previously consuming up to 12 beers a day.         Allergies: Oxycodone, Ticagrelor, Morphine, and Hydromorphone     Medications: Current Medications[1]      ROS:  Review of Systems   Constitutional:  Negative for chills and fever.   Respiratory:  Negative for cough and shortness of breath.    Cardiovascular:  Negative for chest pain, palpitations, orthopnea and leg swelling.       Objective:     Exam:  /66   Pulse 79   Temp 36.4 °C (97.6 °F) (Temporal)   Resp 18   Ht 1.626 m (5' 4\")   Wt 67.7 kg (149 lb 3.2 oz)   SpO2 97%   BMI 25.61 kg/m²  Body mass index is 25.61 kg/m².    Physical Exam  Constitutional:       Appearance: Normal appearance.   Eyes:      Pupils: Pupils are equal, round, and reactive to light.   Cardiovascular:      Rate and Rhythm: Normal rate and regular rhythm.      Pulses: Normal pulses.      Heart sounds: Normal " heart sounds.   Pulmonary:      Effort: Pulmonary effort is normal.      Breath sounds: Normal breath sounds.   Abdominal:      General: Bowel sounds are normal.      Palpations: Abdomen is soft.   Neurological:      Mental Status: She is alert and oriented to person, place, and time.   Psychiatric:         Mood and Affect: Mood normal.         Behavior: Behavior normal.           Assessment & Plan:     Rony connelly 70 y.o. female with the following -     Assessment & Plan     1. Chronic heart failure with preserved ejection fraction (HFpEF, >= 50%) ([facility])  2. S/P CABG x 3  3. ACS (acute coronary syndrome) ([facility])  Chronic, stable  Symptoms of queasiness, vomiting, and gas are likely due to gastroparesis exacerbated by Ozempic. This has been a single occurrence since starting this medication. She would like to increase her dose. Will increase to 1 mg weekly.   - Semaglutide, 1 MG/DOSE, (OZEMPIC, 1 MG/DOSE,) 4 MG/3ML Solution Pen-injector; Inject 1 mg under the skin every 7 days.  Dispense: 6 mL; Refill: 6    4. Nausea and vomiting, unspecified vomiting type  Acute, uncomplicated  Symptoms of queasiness, vomiting, and gas are likely due to gastroparesis exacerbated by Ozempic. Recommend a low fiber diet for a few days to allow for easier digestion. Follow up if symptoms worsen with dose increase of Ozempic.   Treatment plan: Vomiting occurred about 12 to 14 hours after the last injection, with partially digested food and no bile. Discussion about increasing the Ozempic dosage; if difficulty in maintaining food intake or increased vomiting occurs, dosage will be adjusted. Ozempic dosage will be decreased.     5. Insomnia, unspecified type  Chronic, unstable  Insomnia started about 1.5 to 2 weeks ago.  Treatment plan: She has been using melatonin, Benadryl, CBD, and THC in rotation to manage her symptoms. Advised to increase Benadryl dosage to 50 mg during severe episodes of insomnia. Plans to order B vitamins and melatonin  today.    6. Alcohol dependence in remission (HCC)  Chronic, stable  Reports being a recovering alcoholic and has not had any cravings recently.  Treatment plan: Occasionally tests her addictive behavior by consuming a single beer, which results in nausea and headache. Reinforced decision to abstain from alcohol. Continues to use THC to help with sleep.    7. Chronic midline thoracic back pain  Chronic, stable      Anticipatory guidance included the following: Patient counseled about skin care, diet, supplements, smoking, drugs/alcohol use, safe sex and exercise.     Return in about 6 weeks (around 6/25/2025).    Please note that this dictation was created using voice recognition software. I have made every reasonable attempt to correct obvious errors, but I expect that there are errors of grammar and possibly content that I did not discover before finalizing the note.               [1]   Current Outpatient Medications:     Semaglutide, 1 MG/DOSE, (OZEMPIC, 1 MG/DOSE,) 4 MG/3ML Solution Pen-injector, Inject 1 mg under the skin every 7 days., Disp: 6 mL, Rfl: 6    rosuvastatin (CRESTOR) 20 MG Tab, Take 1 tablet by mouth every evening., Disp: 100 Tablet, Rfl: 1    furosemide (LASIX) 20 MG Tab, Take 1 Tablet by mouth 1 time a day as needed (As needed for weight gain or edema)., Disp: 100 Tablet, Rfl: 0    Empagliflozin (JARDIANCE) 10 MG Tab tablet, Take 1 Tablet by mouth every day., Disp: 100 Tablet, Rfl: 3    metoprolol SR (TOPROL XL) 25 MG TABLET SR 24 HR, Take 1 Tablet by mouth every day., Disp: 100 Tablet, Rfl: 3    spironolactone (ALDACTONE) 25 MG Tab, Take 1 Tablet by mouth every day., Disp: 100 Tablet, Rfl: 3    Melatonin 5 MG Cap, Take 5-10 mg by mouth at bedtime as needed., Disp: , Rfl:     citalopram (CELEXA) 40 MG Tab, Take 1 Tablet by mouth every day., Disp: 100 Tablet, Rfl: 3    alendronate (FOSAMAX) 70 MG Tab, Take 1 Tablet by mouth every 7 days., Disp: 12 Tablet, Rfl: 3    aspirin 81 MG EC tablet, Take 1  tablet by mouth every day., Disp: 100 Tablet, Rfl: 3    nitroglycerin (NITROSTAT) 0.4 MG SL Tab, Place 1 Tablet under the tongue one time as needed for Chest Pain for up to 1 dose., Disp: 12 Tablet, Rfl: 3    diphenhydrAMINE (BENADRYL) 25 MG Tab, Take 25 mg by mouth at bedtime., Disp: , Rfl:     acetaminophen (TYLENOL) 500 MG Tab, Take 500 mg by mouth every 6 hours as needed for Mild Pain., Disp: , Rfl:

## 2025-05-16 PROCEDURE — RXMED WILLOW AMBULATORY MEDICATION CHARGE

## 2025-05-19 ENCOUNTER — PHARMACY VISIT (OUTPATIENT)
Dept: PHARMACY | Facility: MEDICAL CENTER | Age: 70
End: 2025-05-19
Payer: COMMERCIAL

## 2025-05-30 ENCOUNTER — PATIENT OUTREACH (OUTPATIENT)
Dept: HEALTH INFORMATION MANAGEMENT | Facility: OTHER | Age: 70
End: 2025-05-30
Payer: MEDICARE

## 2025-05-30 DIAGNOSIS — I50.32 CHRONIC HEART FAILURE WITH PRESERVED EJECTION FRACTION (HFPEF, >= 50%) (HCC): Primary | ICD-10-CM

## 2025-05-30 DIAGNOSIS — I10 PRIMARY HYPERTENSION: Primary | ICD-10-CM

## 2025-05-30 DIAGNOSIS — I25.83 CORONARY ARTERY DISEASE DUE TO LIPID RICH PLAQUE: ICD-10-CM

## 2025-05-30 DIAGNOSIS — F39 MOOD DISORDER (HCC): ICD-10-CM

## 2025-05-30 DIAGNOSIS — E78.2 MIXED HYPERLIPIDEMIA: ICD-10-CM

## 2025-05-30 DIAGNOSIS — F10.21 ALCOHOL DEPENDENCE IN REMISSION (HCC): ICD-10-CM

## 2025-05-30 DIAGNOSIS — I25.10 CORONARY ARTERY DISEASE DUE TO LIPID RICH PLAQUE: ICD-10-CM

## 2025-05-30 DIAGNOSIS — I50.32 CHRONIC HEART FAILURE WITH PRESERVED EJECTION FRACTION (HFPEF, >= 50%) (HCC): ICD-10-CM

## 2025-05-30 DIAGNOSIS — I70.0 ARTERIOSCLEROSIS OF AORTA (HCC): ICD-10-CM

## 2025-05-30 DIAGNOSIS — F43.10 PTSD (POST-TRAUMATIC STRESS DISORDER): ICD-10-CM

## 2025-05-30 NOTE — PROGRESS NOTES
"Reason for Follow-Up:  Monthly outreach follow up    Current Health Status:  CHF, HTN    Medical Updates:    No recent ER visits or hospitalization    Medication Updates:    Semaglutide 1mg Q7days    Symptoms:    Reports an episode of low blood pressure, systolic in the 80s  Denies any lower extremity swelling or shortness breath       Plan of Care Goals and Progress:    Goal 1. Hypertension education      Progress:  Reports an episode of  low blood pressure with systolic in the 80s. Now she reports systolic 104-108.       Barriers:  Age, progression of disease process, habits      Interventions:  Reviewed next PCP follow up     Education:  Reviewed medications and discussed continues to monitor vital signs. Discussed stress management and reviewed benefits of a behavioral health referral. Will follow up with PCP     Goal 2.  CHF education      Progress:  Rony reports she feels dehydrated, reported an episode of low blood pressure with systolic in the 80s. She reports holding her spironolactone. She monitoring her weight daily, fluid intake, and vials signs closely. She states she will follow up with Cardiology in the next week.       Barriers:  Age, progression of disease process, habits      Interventions:  Discussed follow up via MyChart with Cardiology, reviewed next Cardiology follow up 9/16     Education:  Reviewed medications and discussed continues to monitor vital signs       Patient's Concerns and Feedback (Self Management of Care):   Spoke with Rony for monthly outreach follow-up.  She states that she is \"fair to [place]\".  Reviewed her increase in Ozempic dose.  She reports she has lost 2 pounds in 2 weeks and she is currently at 145.  Reviewed side effects of Ozempic.  Discussed CHF and hypertension education as noted above.  Rony denied any other needs at this time encouraged to call with any questions or concerns    Next Steps:     Follow-Up Plan:  June 2025      Appointments:  7/9 PCP, 9/16 " Cardiology     Contact Information:  Call [phone] with any questions or concerns.

## 2025-05-30 NOTE — CARE PLAN
"  Problem: Knowledge deficit of factors contributing to hypertension  Goal: Demonstrate factors that can contribute to high blood pressure/long term 11/7/24-5/31/25  Outcome: Progressing  Note: Patient has verbalized understanding of factors contributing to high blood pressure   Intervention: Educate patient on role of stress/anxiety     Problem: Excess Fluid  Goal: Establish a plan for excess fluid management/short term 11/7/24-2/28/25  Outcome: Progressing  Intervention: Check weight daily  Intervention: Know your water pill: \"Taking a Diuretic\"  Intervention: Establish regular follow-ups with PCP/Cardiology  Intervention: Refer patient to PCP/Cardiology  Intervention: Determine patient's next PCP/Cardiology visit  Intervention: Discuss schedule for PCP/Cardiology visits with patient     Problem: Knowledge deficient regarding condition, treatment regimen, and self care  Goal: Increase understanding of cardiac function/disease/failure/long term 11/7/24-5/31/25  Intervention: Stress importance of reporting signs and symptoms of digitalis toxicity: development of gastrointestinal (Gl) and visual disturbances, changes in pulse rate and rhythm, worsening of heart failure  Note: Not prescribed Digoxin      "

## 2025-06-09 ENCOUNTER — TELEPHONE (OUTPATIENT)
Dept: HEALTH INFORMATION MANAGEMENT | Facility: OTHER | Age: 70
End: 2025-06-09

## 2025-07-01 ENCOUNTER — PATIENT OUTREACH (OUTPATIENT)
Dept: HEALTH INFORMATION MANAGEMENT | Facility: OTHER | Age: 70
End: 2025-07-01
Payer: MEDICARE

## 2025-07-01 DIAGNOSIS — I25.10 CORONARY ARTERY DISEASE DUE TO LIPID RICH PLAQUE: ICD-10-CM

## 2025-07-01 DIAGNOSIS — I50.32 CHRONIC HEART FAILURE WITH PRESERVED EJECTION FRACTION (HFPEF, >= 50%) (HCC): ICD-10-CM

## 2025-07-01 DIAGNOSIS — E78.2 MIXED HYPERLIPIDEMIA: ICD-10-CM

## 2025-07-01 DIAGNOSIS — I25.83 CORONARY ARTERY DISEASE DUE TO LIPID RICH PLAQUE: ICD-10-CM

## 2025-07-01 DIAGNOSIS — I70.0 ARTERIOSCLEROSIS OF AORTA (HCC): ICD-10-CM

## 2025-07-01 DIAGNOSIS — I10 PRIMARY HYPERTENSION: Primary | ICD-10-CM

## 2025-07-01 NOTE — PROGRESS NOTES
Reason for Follow-Up:  Monthly outreach follow up    Current Health Status:  CHF, HTN    Medical Updates:    No recent ER visits or hospitalization    Medication Updates:    Stopped Ozempic due to GI side effects     Symptoms:    Reports nauseas and dry heaves due to side effect of Ozempic     Plan of Care Goals and Progress:    Goal 1. Hypertension education      Progress:  Reports her blood pressure at home range from 108-126/50s-60s and her HR is in the 60s      Barriers:  Age, progression of disease process, habits      Interventions:  Reviewed next PCP and Cardiology follow up     Education:  Reviewed low sodium diet     Goal 2. CHF education      Progress:  Reports some increased coughing a recently but this has resolved. She reports monitoring her weight daily, fluid intake, and vials signs closely.      Barriers:  Age, progression of disease process, habits      Interventions:  Reviewed next PCP and Cardiology follow up     Education:  Reviewed low sodium diet       Patient's Concerns and Feedback (Self Management of Care):   Spoke with Rony for monthly outreach follow up. She reports hew has been unable to tolerate GI side effect with Ozempic . She has stopped Ozempic and communicate with her PCP via [employer]. Discussed current medications. She has continued Jardiance. Discussed heart healthy diet, Reviewed CHF and hypertension education as above. Reviewed Behavioral Health Referral. Rony states her received a call but did not feel she wants to continue with therapy.  She states her depression is starting to get better since she has stopped Ozemipic. Will continue to monitor. She denied any other needs at this time. Encouraged to call with any questions or concerns. Quarterly assessment completed: patient continues to qualify and would benefit from PCM program. Reviewed with current priorities and healthcare goals, continue plan of care until goals are met.    Next Steps:     Follow-Up Plan:  July  2025      Appointments:  7/9 PCP, 9/16 Cardiology       Contact Information:  Call [phone] with any questions or concerns.

## 2025-07-01 NOTE — CARE PLAN
"  Problem: Knowledge deficit of factors contributing to hypertension  Goal: Demonstrate factors that can contribute to high blood pressure/long term 11/7/24-5/31/25  Outcome: Progressing  Note: Patient has verbalized understanding of factors contributing to high blood pressure   Intervention: Educate patient on role of sodium in diet     Problem: Low Sodium Diet Management  Goal: Limit dietary sodium/short term 11/7/24-2/28/25  Outcome: Met  Intervention: Provide resources on reading food labels  Intervention: Establish a target sodium restriction with the patient in conjunction with PCP/cardiology  Intervention: Provide education on low-salt choices  Intervention: Provide patient with information on \"Heart Failure: Making Changes to your Diet\"     "

## 2025-07-09 ENCOUNTER — OFFICE VISIT (OUTPATIENT)
Dept: MEDICAL GROUP | Facility: MEDICAL CENTER | Age: 70
End: 2025-07-09
Payer: MEDICARE

## 2025-07-09 VITALS
WEIGHT: 142.8 LBS | RESPIRATION RATE: 14 BRPM | HEART RATE: 74 BPM | DIASTOLIC BLOOD PRESSURE: 58 MMHG | SYSTOLIC BLOOD PRESSURE: 100 MMHG | OXYGEN SATURATION: 98 % | HEIGHT: 64 IN | TEMPERATURE: 97.2 F | BODY MASS INDEX: 24.38 KG/M2

## 2025-07-09 DIAGNOSIS — I50.32 CHRONIC HEART FAILURE WITH PRESERVED EJECTION FRACTION (HFPEF, >= 50%) (HCC): ICD-10-CM

## 2025-07-09 DIAGNOSIS — T50.905A ADVERSE EFFECT OF DRUG, INITIAL ENCOUNTER: Primary | ICD-10-CM

## 2025-07-09 DIAGNOSIS — I25.10 CORONARY ARTERY DISEASE DUE TO LIPID RICH PLAQUE: ICD-10-CM

## 2025-07-09 DIAGNOSIS — G47.00 INSOMNIA, UNSPECIFIED TYPE: ICD-10-CM

## 2025-07-09 DIAGNOSIS — I25.83 CORONARY ARTERY DISEASE DUE TO LIPID RICH PLAQUE: ICD-10-CM

## 2025-07-09 DIAGNOSIS — Z95.1 S/P CABG X 3: ICD-10-CM

## 2025-07-09 PROBLEM — E66.3 OVERWEIGHT (BMI 25.0-29.9): Status: RESOLVED | Noted: 2022-11-02 | Resolved: 2025-07-09

## 2025-07-09 PROCEDURE — 3078F DIAST BP <80 MM HG: CPT

## 2025-07-09 PROCEDURE — 99214 OFFICE O/P EST MOD 30 MIN: CPT

## 2025-07-09 PROCEDURE — 3074F SYST BP LT 130 MM HG: CPT

## 2025-07-09 ASSESSMENT — ENCOUNTER SYMPTOMS
SHORTNESS OF BREATH: 0
CHILLS: 0
COUGH: 0
FEVER: 0
ORTHOPNEA: 0
PALPITATIONS: 0

## 2025-07-09 ASSESSMENT — FIBROSIS 4 INDEX: FIB4 SCORE: 1.66

## 2025-07-09 NOTE — PROGRESS NOTES
Subjective:     Verbal consent was acquired by the patient to use [employer] ambient listening note generation during this visit Yes    CC: Follow-Up      HPI:   Rony is a 70 y.o. female who presents today for:    History of Present Illness  The patient presents for evaluation and management of weight, heart failure with preserved ejection fraction (HFpEF), and sleep disturbances.    Medication intolerance   The patient has discontinued Ozempic for nearly 4 weeks and reports today as the first day without feeling unwell. She experienced severe emesis and persistent nausea while on the medication, resulting in anorexia and food aversions. Additionally, she noted abdominal distention and pain during defecation. Her target weight is 140 pounds, but she expresses concern regarding the maintenance of her current weight, having lost an additional 7 pounds due to illness. Attempts to mitigate symptoms with water, electrolyte fluids, and crackers were unsuccessful. She experienced acid indigestion, which was unresponsive to Pepcid at doses of 20 mg or 40 mg. An episode involving the consumption of spaghetti led to a burning sensation in her mouth and throat after only three bites, indicating heightened sensitivity to spicy foods.  - Onset: Symptoms began while on Ozempic, discontinued nearly 4 weeks ago.  - Duration: Persistent nausea and emesis while on medication.  - Character: Severe emesis, persistent nausea, anorexia, food aversions, abdominal distention, pain during defecation, acid indigestion, burning sensation in mouth and throat.  - Alleviating/Aggravating Factors: Attempts to mitigate symptoms with water, electrolyte fluids, and crackers were unsuccessful; Pepcid unresponsive; heightened sensitivity to spicy foods.  - Severity: Severe symptoms leading to weight loss of 7 pounds.    Heart Failure with Preserved Ejection Fraction (HFpEF) and Cardiac Concerns  The patient regularly monitors her blood pressure and  refrains from taking diuretics if her morning readings fall below 105 mmHg. She inquires about the timing for her next echocardiogram, noting that it will be a year since her last one in October 2025. Due to her awareness of the risk for sudden cardiac death, she is considering the placement of an implantable cardioverter-defibrillator (ICD). Her ejection fraction was recorded at 70% post-surgery and 55% post-myocardial infarction. She has made dietary modifications and observed improvements in her laboratory results, with an A1c of 4.9 and normalized triglyceride levels.  - Onset: Post-surgery and post-myocardial infarction.  - Duration: Ongoing monitoring and management.  - Character: HFpEF with ejection fraction recorded at 70% post-surgery and 55% post-myocardial infarction.  - Alleviating/Aggravating Factors: Dietary modifications, regular blood pressure monitoring, refraining from diuretics if readings fall below 105 mmHg.  - Timing: Next echocardiogram due in October 2025.  - Severity: Considering placement of ICD due to risk of sudden cardiac death.    Sleep Disturbances  Her sleep has shown slight improvement. She administers 5 mg of melatonin nightly, supplemented by a 25 mg CBD capsule. If she remains awake after 2 hours, she takes an additional 25 mg of diphenhydramine (Benadryl). This regimen allows her to achieve 4 to 6 hours of sleep, which is an improvement compared to her pre-menopausal state. She abstains from alcohol due to a history of alcoholism. She plans to order B complex vitamins, believing they may assist with her active imagination. She has also tried placing lavender in her pillowcase to aid relaxation.  - Onset: Sleep disturbances improved post-menopause.  - Duration: Ongoing.  - Character: Difficulty falling asleep, improved sleep duration with regimen.  - Alleviating/Aggravating Factors: Melatonin, CBD capsule, diphenhydramine, abstaining from alcohol, B complex vitamins, lavender in  "pillowcase.  - Timing: Achieves 4 to 6 hours of sleep nightly.  - Severity: Improved compared to pre-menopausal state.    PAST SURGICAL HISTORY:  - Cardiac surgery  - Myocardial infarction    SOCIAL HISTORY  She does not drink alcohol because she is an alcoholic.         Allergies: Oxycodone, Ticagrelor, Morphine, and Hydromorphone     Medications: Current Medications[1]      ROS:  Review of Systems   Constitutional:  Negative for chills and fever.   Respiratory:  Negative for cough and shortness of breath.    Cardiovascular:  Negative for chest pain, palpitations, orthopnea and leg swelling.       Objective:     Exam:  /58   Pulse 74   Temp 36.2 °C (97.2 °F) (Temporal)   Resp 14   Ht 1.626 m (5' 4\")   Wt 64.8 kg (142 lb 12.8 oz)   SpO2 98%   BMI 24.51 kg/m²  Body mass index is 24.51 kg/m².    Physical Exam  Constitutional:       Appearance: Normal appearance.   Eyes:      Pupils: Pupils are equal, round, and reactive to light.   Cardiovascular:      Rate and Rhythm: Normal rate and regular rhythm.      Pulses: Normal pulses.      Heart sounds: Normal heart sounds.   Pulmonary:      Effort: Pulmonary effort is normal.      Breath sounds: Normal breath sounds.   Abdominal:      General: Bowel sounds are normal.      Palpations: Abdomen is soft.   Neurological:      Mental Status: She is alert and oriented to person, place, and time.   Psychiatric:         Mood and Affect: Mood normal.         Behavior: Behavior normal.           Assessment & Plan:     Rony connelly 70 y.o. female with the following -     Assessment & Plan     1. Adverse effect of drug, initial encounter  Acute, uncomplicated  Her weight loss is commendable, but the discontinuation of Ozempic due to intolerance is unfortunate. She has lost an additional 7 pounds, likely due to her recent illness. Her goal weight is 140 pounds, and she is currently at 142 pounds.  Treatment plan: She was advised to maintain a healthy diet, engage in regular " exercise as tolerated, and monitor her salt intake. Concerns about maintaining weight gain were discussed, given that she is stopped her Ozempic. She was encouraged to continue healthy lifestyle choices.    2. S/P CABG x 3  3. Coronary artery disease due to lipid rich plaque  4. Chronic heart failure with preserved ejection fraction (HFpEF, >= 50%) (HCC)  Chronic, stable  Her ejection fraction remains stable.  Diagnostic plan: She was informed that an echocardiogram is typically performed annually, or per cardiology discretion.  Treatment plan: she is questioning if she needs a implantable cardioverter-defibrillator. She was advised to discuss the possibility of an implantable cardioverter-defibrillator with Dr. Flores during her next visit. She is refraining from diuretics if  SBP fall below 105 mmHg. Continue Metoprolol 25 mg daily, Spironolactone 25 mg daily and Lasix 20 mg daily as needed.   Clinical decision making: She expressed concerns about sudden cardiac death and was reassured about current management and monitoring.    5. Insomnia, unspecified type  Chronic, unstable- improving  She reported improved sleep with the use of melatonin, Benadryl, and CBD capsules.  Treatment plan: She was advised to try magnesium glycinate and B complex vitamins to aid sleep. Additionally, a lavender bath before bedtime was suggested. She mentioned menopause-related sleep issues and was provided with supportive advice.      Anticipatory guidance included the following: Patient counseled about skin care, diet, supplements, smoking, drugs/alcohol use, safe sex and exercise.     Return in about 3 months (around 10/9/2025).    Please note that this dictation was created using voice recognition software. I have made every reasonable attempt to correct obvious errors, but I expect that there are errors of grammar and possibly content that I did not discover before finalizing the note.             [1]   Current Outpatient  Medications:     rosuvastatin (CRESTOR) 20 MG Tab, Take 1 tablet by mouth every evening., Disp: 100 Tablet, Rfl: 1    furosemide (LASIX) 20 MG Tab, Take 1 Tablet by mouth 1 time a day as needed (As needed for weight gain or edema)., Disp: 100 Tablet, Rfl: 0    Empagliflozin (JARDIANCE) 10 MG Tab tablet, Take 1 Tablet by mouth every day., Disp: 100 Tablet, Rfl: 3    metoprolol SR (TOPROL XL) 25 MG TABLET SR 24 HR, Take 1 Tablet by mouth every day., Disp: 100 Tablet, Rfl: 3    spironolactone (ALDACTONE) 25 MG Tab, Take 1 Tablet by mouth every day., Disp: 100 Tablet, Rfl: 3    Melatonin 5 MG Cap, Take 5-10 mg by mouth at bedtime as needed., Disp: , Rfl:     citalopram (CELEXA) 40 MG Tab, Take 1 Tablet by mouth every day., Disp: 100 Tablet, Rfl: 3    alendronate (FOSAMAX) 70 MG Tab, Take 1 Tablet by mouth every 7 days., Disp: 12 Tablet, Rfl: 3    aspirin 81 MG EC tablet, Take 1 tablet by mouth every day., Disp: 100 Tablet, Rfl: 3    nitroglycerin (NITROSTAT) 0.4 MG SL Tab, Place 1 Tablet under the tongue one time as needed for Chest Pain for up to 1 dose., Disp: 12 Tablet, Rfl: 3    diphenhydrAMINE (BENADRYL) 25 MG Tab, Take 25 mg by mouth at bedtime., Disp: , Rfl:     acetaminophen (TYLENOL) 500 MG Tab, Take 500 mg by mouth every 6 hours as needed for Mild Pain., Disp: , Rfl:

## 2025-08-05 ENCOUNTER — SPECIALTY PHARMACY (OUTPATIENT)
Dept: CARDIOLOGY | Facility: MEDICAL CENTER | Age: 70
End: 2025-08-05
Payer: MEDICARE

## 2025-08-11 DIAGNOSIS — I24.0 ISCHEMIC HEART DISEASE DUE TO CORONARY ARTERY OBSTRUCTION (HCC): ICD-10-CM

## 2025-08-11 DIAGNOSIS — I50.32 CHRONIC HEART FAILURE WITH PRESERVED EJECTION FRACTION (HFPEF, >= 50%) (HCC): ICD-10-CM

## 2025-08-11 DIAGNOSIS — I25.9 ISCHEMIC HEART DISEASE DUE TO CORONARY ARTERY OBSTRUCTION (HCC): ICD-10-CM

## 2025-08-11 DIAGNOSIS — I10 PRIMARY HYPERTENSION: ICD-10-CM

## 2025-08-12 RX ORDER — FUROSEMIDE 20 MG/1
20 TABLET ORAL
Qty: 100 TABLET | Refills: 0 | Status: SHIPPED | OUTPATIENT
Start: 2025-08-12

## 2025-08-13 ENCOUNTER — PATIENT OUTREACH (OUTPATIENT)
Dept: HEALTH INFORMATION MANAGEMENT | Facility: OTHER | Age: 70
End: 2025-08-13
Payer: MEDICARE

## 2025-08-13 DIAGNOSIS — I25.10 CORONARY ARTERY DISEASE DUE TO LIPID RICH PLAQUE: ICD-10-CM

## 2025-08-13 DIAGNOSIS — M81.0 AGE-RELATED OSTEOPOROSIS WITHOUT CURRENT PATHOLOGICAL FRACTURE: ICD-10-CM

## 2025-08-13 DIAGNOSIS — I10 PRIMARY HYPERTENSION: Primary | ICD-10-CM

## 2025-08-13 DIAGNOSIS — I50.32 CHRONIC HEART FAILURE WITH PRESERVED EJECTION FRACTION (HFPEF, >= 50%) (HCC): ICD-10-CM

## 2025-08-13 DIAGNOSIS — I70.0 ARTERIOSCLEROSIS OF AORTA (HCC): ICD-10-CM

## 2025-08-13 DIAGNOSIS — I25.83 CORONARY ARTERY DISEASE DUE TO LIPID RICH PLAQUE: ICD-10-CM

## 2025-08-14 RX ORDER — ALENDRONATE SODIUM 70 MG/1
70 TABLET ORAL
Qty: 14 TABLET | Refills: 3 | Status: SHIPPED | OUTPATIENT
Start: 2025-08-14

## 2025-08-20 PROCEDURE — RXMED WILLOW AMBULATORY MEDICATION CHARGE: Performed by: INTERNAL MEDICINE

## 2025-08-20 PROCEDURE — RXMED WILLOW AMBULATORY MEDICATION CHARGE

## 2025-08-27 ENCOUNTER — PHARMACY VISIT (OUTPATIENT)
Dept: PHARMACY | Facility: MEDICAL CENTER | Age: 70
End: 2025-08-27
Payer: COMMERCIAL

## (undated) DEVICE — DRESSING TRANSPARENT FILM TEGADERM 4 X 4.75" (50EA/BX)"

## (undated) DEVICE — DRESSING LEUKOMED STERILE 11.75X4IN - (50/CA)

## (undated) DEVICE — SET FLUID WARMING STANDARD FLOW - (10/CA)

## (undated) DEVICE — SODIUM CHL. INJ. 0.9% 500ML (24EA/CA 50CA/PF)

## (undated) DEVICE — SPONGE XRAY 8X4 STERL. 12PL - (10EA/TY 80TY/CA)

## (undated) DEVICE — LACTATED RINGERS INJ 1000 ML - (14EA/CA 60CA/PF)

## (undated) DEVICE — ELECTRODE 850 FOAM ADHESIVE - HYDROGEL RADIOTRNSPRNT (50/PK)

## (undated) DEVICE — TRAY SURESTEP FOLEY TEMP SENSING 16FR (10EA/CA) ORDER  #18764 FOR TEMP FOLEY ONLY

## (undated) DEVICE — GLOVE BIOGEL SZ 8 SURGICAL PF LTX - (50PR/BX 4BX/CA)

## (undated) DEVICE — SUTURE 5-0 PROLENE C-1 D/A 24 (36PK/BX)"

## (undated) DEVICE — SUTURE 4-0 PROLENE RB-1 D/A 36 (36PK/BX)"

## (undated) DEVICE — CATHETER SUCTION 14 FR. WITH CONTROL PORT (100/CS)

## (undated) DEVICE — SUTURE 0 COATED VICRYL PLUS - CTX CR(12/BX)18 IN

## (undated) DEVICE — SET BIFURCATED BLOOD - (48EA/CS)

## (undated) DEVICE — TUBE CHEST 32FR. STRAIGHT - (10EA/CA)

## (undated) DEVICE — SET EXTENSION WITH 2 PORTS (48EA/CA) ***PART #2C8610 IS A SUBSTITUTE*****

## (undated) DEVICE — ELECTRODE DUAL RETURN W/ CORD - (50/PK)

## (undated) DEVICE — STAPLER SKIN DISP - (6/BX 10BX/CA) VISISTAT

## (undated) DEVICE — MASK ANESTHESIA ADULT  - (100/CA)

## (undated) DEVICE — SYRINGE 30 ML LL (56/BX)

## (undated) DEVICE — SENSOR OXIMETER ADULT SPO2 RD SET (20EA/BX)

## (undated) DEVICE — KIT CATHETERIZATION TWO-LUMEN CENTRAL VENOUS PI CVC (5EA/CA)

## (undated) DEVICE — KIT RADIAL ARTERY 20GA W/MAX BARRIER AND BIOPATCH  (5EA/CA) #10740 IS FOR THE SET RADIAL ARTERIAL

## (undated) DEVICE — BLADE SURGICAL #15 - (50/BX 3BX/CA)

## (undated) DEVICE — SENSOR CEREBRAL AND SOMATIC MONITORING (20/CA)

## (undated) DEVICE — TUBING CLEARLINK DUO-VENT - C-FLO (48EA/CA)

## (undated) DEVICE — SUTURE 5 SURGICAL STEEL V-40 - (12/BX) CCS CURRENT

## (undated) DEVICE — POLY UMBILICAL TAPE 1/8X30 - (36/BX)

## (undated) DEVICE — GLOVE BIOGEL INDICATOR SZ 8.5 SURGICAL PF LTX - (50/BX 4BX/CA)

## (undated) DEVICE — SET LEADWIRE 5 LEAD BEDSIDE DISPOSABLE ECG (1SET OF 5/EA)

## (undated) DEVICE — WAX BONE ALKYLENE OXIDE HEMOSTASIS OSTENE 3.5GM (10EA/CA)

## (undated) DEVICE — BLADE BEAVER 6900 MINI SHARP ALL AROUND (20/CA)

## (undated) DEVICE — BLOWER/MISTER (5EA/PK)

## (undated) DEVICE — TUBE CHEST 32FR. RIGHT ANGLED (10EA/CA)

## (undated) DEVICE — SUTURE 4-0 30CM STRATAFIX SPIRAL PS-2 (12EA/BX)

## (undated) DEVICE — NEEDLE SAFETY 18 GA X 1 1/2 IN (100EA/BX)

## (undated) DEVICE — PUNCH DISP VASCULAR 4.4 - 6/BX

## (undated) DEVICE — SUCTION INSTRUMENT YANKAUER BULBOUS TIP W/O VENT (50EA/CA)

## (undated) DEVICE — SYSTEM CHEST DRAIN ADULT/PEDS W/AUTO TRANSFUSION CAPABILITY SAHARA (6EA/CA)

## (undated) DEVICE — SPRING BULLDOG 1/2 FORCE BLUE - (10/BX)

## (undated) DEVICE — DRESSING TRANSPARENT FILM TEGADERM 2.375 X 2.75"  (100EA/BX)"

## (undated) DEVICE — PAD LAP STERILE 18 X 18 - (5/PK 40PK/CA)

## (undated) DEVICE — KIT INTROPERCUTANEOUS SHEATH - 8.5 FR W/MAX BARRIER AND BIOPATCH  (5/CA)

## (undated) DEVICE — SLEEVE, VASO, THIGH, MED

## (undated) DEVICE — INSERT STEALTH #5 - (10/BX)

## (undated) DEVICE — SYRINGE NON SAFETY 3 CC 21 GA X 1 1/2 IN (100/BX 8BX/CA)

## (undated) DEVICE — BLADE ELECTRODE EDGE INSULATED 4 IN (50EA/BX)

## (undated) DEVICE — BOVIE BLADE COATED &INSULATED - 25/PK

## (undated) DEVICE — PUNCH 4MM SHRP CNCL TIP DL - (6/BX)

## (undated) DEVICE — SUTURE 6-0 PROLENE RB-2 D/A 30 (36PK/BX)"

## (undated) DEVICE — ARMBOARD  SMALL IV 9 INLONG - (25EA/CA)

## (undated) DEVICE — LEAD PACING TEMP MYO - (12/BX)

## (undated) DEVICE — SUTURE 3-0 PROLENE SH 36 (36PK/BX)"

## (undated) DEVICE — TOWELS CLOTH SURGICAL - (4/PK 20PK/CA)

## (undated) DEVICE — D-5-W INJ. 500 ML - (24EA/CA)

## (undated) DEVICE — GLOVE BIOGEL PI ORTHO SZ 6 SURGICAL PF LF (40PR/BX)

## (undated) DEVICE — PROTECTOR ULNA NERVE - (36PR/CA)

## (undated) DEVICE — GOWN SURGEONS X-LARGE - DISP. (30/CA)

## (undated) DEVICE — FIBRILLAR SURGICEL 4X4 - 10/CA

## (undated) DEVICE — SUTURE OHS

## (undated) DEVICE — GOWN SURGICAL XX-LARGE - (28EA/CA) SIRUS NON REINFORCED

## (undated) DEVICE — KIT ANESTHESIA W/CIRCUIT & 3/LT BAG W/FILTER (20EA/CA)

## (undated) DEVICE — BAG RESUSCITATION DISPOSABLE - WITH MASK (10 EA/CA)

## (undated) DEVICE — SYS DLV COST CLS RM TEMP - INJECTATE (CO-SET II) (10EA/CA)

## (undated) DEVICE — HEAD HOLDER JUNIOR/ADULT

## (undated) DEVICE — SOD. CHL. INJ. 0.9% 1000 ML - (14EA/CA 60CA/PF)

## (undated) DEVICE — SPONGE GAUZE STER 4X4 8-PL - (2/PK 50PK/BX 12BX/CS)

## (undated) DEVICE — SODIUM CHL IRRIGATION 0.9% 1000ML (12EA/CA)

## (undated) DEVICE — TOWEL STOP TIMEOUT SAFETY FLAG (40EA/CA)

## (undated) DEVICE — CANISTER SUCTION 3000ML MECHANICAL FILTER AUTO SHUTOFF MEDI-VAC NONSTERILE LF DISP  (40EA/CA)

## (undated) DEVICE — SUTURE 7-0 PROLENE BV175-6 (36PK/BX)

## (undated) DEVICE — DECANTER FLD BLS - (50/CA)

## (undated) DEVICE — MICRODRIP PRIMARY VENTED 60 (48EA/CA) THIS WAS PART #2C8428 WHICH WAS DISCONTINUED

## (undated) DEVICE — BLADE STERNUM SAW SURGICAL 32.0 X 6.4 MM STERILE (1/EA)

## (undated) DEVICE — STOPCOCK MALE 4-WAY - (50/CA)

## (undated) DEVICE — CONNECTOR Y TBG CRTY 5 IN 1 STERILE (50EA/CA)

## (undated) DEVICE — DERMABOND ADVANCED - (12EA/BX)

## (undated) DEVICE — TUBE SHILEY ENDOTRACHEAL ORAL RAE CUFFED 8.0MM WITH TAPERGUARD (10EA/BX)

## (undated) DEVICE — SOLUTION NORMOSOL-4 INJ 1000ML

## (undated) DEVICE — TRANSDUCER BIFURCATED MONITORING KIT (10EA/CA)

## (undated) DEVICE — CATHETER TROCAR 28FR.

## (undated) DEVICE — SUTURE 7-0 PROLENE 24BV175-6 - EP8735H (36/BX)"

## (undated) DEVICE — TUBING INSUFFLATION PNEUMOCLEAR HIGH-FLOW (10EA/BX)

## (undated) DEVICE — DILATORS PARSONNET 1.5MM

## (undated) DEVICE — CHLORAPREP 26 ML APPLICATOR - ORANGE TINT(25/CA)

## (undated) DEVICE — CATHETER THERMALDILUTION SWAN - (5EA/CA)

## (undated) DEVICE — KIT TOURNIQUET DLP (40EA/PK)

## (undated) DEVICE — TUBE CONNECT SUCTION CLEAR 120 X 1/4" (50EA/CA)"

## (undated) DEVICE — CORETEMP DRAPE FORM-FITTED EASY DROPANDGO DRAPE FOR USE ON THE CORETEMP FLUID MANAGEMENT 56IN X 56IN